# Patient Record
Sex: FEMALE | Race: WHITE | NOT HISPANIC OR LATINO | Employment: OTHER | URBAN - METROPOLITAN AREA
[De-identification: names, ages, dates, MRNs, and addresses within clinical notes are randomized per-mention and may not be internally consistent; named-entity substitution may affect disease eponyms.]

---

## 2017-07-30 ENCOUNTER — HOSPITAL ENCOUNTER (INPATIENT)
Facility: HOSPITAL | Age: 47
LOS: 7 days | Discharge: HOME | DRG: 751 | End: 2017-08-07
Attending: EMERGENCY MEDICINE | Admitting: PSYCHIATRY & NEUROLOGY
Payer: COMMERCIAL

## 2017-07-30 DIAGNOSIS — G40.909 EPILEPSY (HCC): ICD-10-CM

## 2017-07-30 DIAGNOSIS — R45.851 SUICIDAL THOUGHTS: ICD-10-CM

## 2017-07-30 DIAGNOSIS — J45.909 ASTHMA: ICD-10-CM

## 2017-07-30 DIAGNOSIS — F33.2 SEVERE EPISODE OF RECURRENT MAJOR DEPRESSIVE DISORDER, WITHOUT PSYCHOTIC FEATURES (HCC): Primary | Chronic | ICD-10-CM

## 2017-07-30 LAB
AMPHETAMINES SERPL QL SCN: NEGATIVE
BARBITURATES UR QL: NEGATIVE
BENZODIAZ UR QL: NEGATIVE
COCAINE UR QL: NEGATIVE
HCG UR QL: NEGATIVE
METHADONE UR QL: NEGATIVE
OPIATES UR QL SCN: NEGATIVE
PCP UR QL: NEGATIVE
THC UR QL: NEGATIVE

## 2017-07-30 PROCEDURE — 36415 COLL VENOUS BLD VENIPUNCTURE: CPT | Performed by: EMERGENCY MEDICINE

## 2017-07-30 PROCEDURE — 80307 DRUG TEST PRSMV CHEM ANLYZR: CPT | Performed by: EMERGENCY MEDICINE

## 2017-07-30 PROCEDURE — 81025 URINE PREGNANCY TEST: CPT | Performed by: EMERGENCY MEDICINE

## 2017-07-30 PROCEDURE — 80320 DRUG SCREEN QUANTALCOHOLS: CPT | Performed by: EMERGENCY MEDICINE

## 2017-07-31 LAB — ETHANOL SERPL-MCNC: <3 MG/DL (ref 0–3)

## 2017-07-31 PROCEDURE — 99285 EMERGENCY DEPT VISIT HI MDM: CPT

## 2017-07-31 RX ORDER — ALBUTEROL SULFATE 90 UG/1
2 AEROSOL, METERED RESPIRATORY (INHALATION) EVERY 6 HOURS PRN
COMMUNITY
End: 2017-08-07 | Stop reason: HOSPADM

## 2017-07-31 RX ORDER — HYDROXYZINE HYDROCHLORIDE 25 MG/1
25 TABLET, FILM COATED ORAL EVERY 6 HOURS PRN
Status: DISCONTINUED | OUTPATIENT
Start: 2017-07-31 | End: 2017-08-07 | Stop reason: HOSPADM

## 2017-07-31 RX ORDER — OLANZAPINE 10 MG/1
10 TABLET ORAL
Status: DISCONTINUED | OUTPATIENT
Start: 2017-07-31 | End: 2017-08-07 | Stop reason: HOSPADM

## 2017-07-31 RX ORDER — ACETAMINOPHEN 325 MG/1
650 TABLET ORAL EVERY 4 HOURS PRN
Status: DISCONTINUED | OUTPATIENT
Start: 2017-07-31 | End: 2017-08-07 | Stop reason: HOSPADM

## 2017-07-31 RX ORDER — ZOLPIDEM TARTRATE 5 MG/1
5 TABLET ORAL
Status: DISCONTINUED | OUTPATIENT
Start: 2017-07-31 | End: 2017-08-07 | Stop reason: HOSPADM

## 2017-07-31 RX ORDER — HALOPERIDOL 5 MG
5 TABLET ORAL EVERY 8 HOURS PRN
Status: DISCONTINUED | OUTPATIENT
Start: 2017-07-31 | End: 2017-08-07 | Stop reason: HOSPADM

## 2017-07-31 RX ORDER — BENZTROPINE MESYLATE 1 MG/ML
1 INJECTION INTRAMUSCULAR; INTRAVENOUS EVERY 6 HOURS PRN
Status: DISCONTINUED | OUTPATIENT
Start: 2017-07-31 | End: 2017-08-07 | Stop reason: HOSPADM

## 2017-07-31 RX ORDER — LORAZEPAM 2 MG/ML
1 INJECTION INTRAMUSCULAR EVERY 6 HOURS PRN
Status: DISCONTINUED | OUTPATIENT
Start: 2017-07-31 | End: 2017-08-07 | Stop reason: HOSPADM

## 2017-07-31 RX ORDER — MAGNESIUM HYDROXIDE/ALUMINUM HYDROXICE/SIMETHICONE 120; 1200; 1200 MG/30ML; MG/30ML; MG/30ML
30 SUSPENSION ORAL EVERY 4 HOURS PRN
Status: DISCONTINUED | OUTPATIENT
Start: 2017-07-31 | End: 2017-08-07 | Stop reason: HOSPADM

## 2017-07-31 RX ORDER — LORAZEPAM 1 MG/1
1 TABLET ORAL EVERY 8 HOURS PRN
Status: DISCONTINUED | OUTPATIENT
Start: 2017-07-31 | End: 2017-08-07 | Stop reason: HOSPADM

## 2017-07-31 RX ORDER — ACETAMINOPHEN 325 MG/1
650 TABLET ORAL EVERY 6 HOURS PRN
Status: DISCONTINUED | OUTPATIENT
Start: 2017-07-31 | End: 2017-08-07 | Stop reason: HOSPADM

## 2017-07-31 RX ORDER — BENZTROPINE MESYLATE 1 MG/1
1 TABLET ORAL EVERY 6 HOURS PRN
Status: DISCONTINUED | OUTPATIENT
Start: 2017-07-31 | End: 2017-08-07 | Stop reason: HOSPADM

## 2017-07-31 RX ORDER — TRAMADOL HYDROCHLORIDE 50 MG/1
50 TABLET ORAL EVERY 6 HOURS PRN
Status: DISCONTINUED | OUTPATIENT
Start: 2017-07-31 | End: 2017-08-07 | Stop reason: HOSPADM

## 2017-07-31 RX ORDER — OLANZAPINE 10 MG/1
10 INJECTION, POWDER, LYOPHILIZED, FOR SOLUTION INTRAMUSCULAR
Status: DISCONTINUED | OUTPATIENT
Start: 2017-07-31 | End: 2017-08-07 | Stop reason: HOSPADM

## 2017-07-31 RX ORDER — HALOPERIDOL 5 MG/ML
5 INJECTION INTRAMUSCULAR EVERY 6 HOURS PRN
Status: DISCONTINUED | OUTPATIENT
Start: 2017-07-31 | End: 2017-08-07 | Stop reason: HOSPADM

## 2017-07-31 RX ORDER — RISPERIDONE 1 MG/1
1 TABLET, ORALLY DISINTEGRATING ORAL EVERY 4 HOURS PRN
Status: DISCONTINUED | OUTPATIENT
Start: 2017-07-31 | End: 2017-08-07 | Stop reason: HOSPADM

## 2017-07-31 RX ADMIN — ACETAMINOPHEN 650 MG: 325 TABLET, FILM COATED ORAL at 14:43

## 2017-08-01 PROBLEM — F33.2 SEVERE EPISODE OF RECURRENT MAJOR DEPRESSIVE DISORDER, WITHOUT PSYCHOTIC FEATURES (HCC): Chronic | Status: ACTIVE | Noted: 2017-08-01

## 2017-08-01 LAB
ALBUMIN SERPL BCP-MCNC: 3.1 G/DL (ref 3.5–5)
ALP SERPL-CCNC: 87 U/L (ref 46–116)
ALT SERPL W P-5'-P-CCNC: 20 U/L (ref 12–78)
ANION GAP SERPL CALCULATED.3IONS-SCNC: 7 MMOL/L (ref 4–13)
AST SERPL W P-5'-P-CCNC: 19 U/L (ref 5–45)
BASOPHILS # BLD AUTO: 0.01 THOUSANDS/ΜL (ref 0–0.1)
BASOPHILS NFR BLD AUTO: 0 % (ref 0–1)
BILIRUB SERPL-MCNC: 0.44 MG/DL (ref 0.2–1)
BUN SERPL-MCNC: 12 MG/DL (ref 5–25)
CALCIUM SERPL-MCNC: 8.8 MG/DL (ref 8.3–10.1)
CHLORIDE SERPL-SCNC: 104 MMOL/L (ref 100–108)
CHOLEST SERPL-MCNC: 191 MG/DL (ref 50–200)
CO2 SERPL-SCNC: 27 MMOL/L (ref 21–32)
CREAT SERPL-MCNC: 0.84 MG/DL (ref 0.6–1.3)
EOSINOPHIL # BLD AUTO: 0.1 THOUSAND/ΜL (ref 0–0.61)
EOSINOPHIL NFR BLD AUTO: 2 % (ref 0–6)
ERYTHROCYTE [DISTWIDTH] IN BLOOD BY AUTOMATED COUNT: 12.7 % (ref 11.6–15.1)
GFR SERPL CREATININE-BSD FRML MDRD: 84 ML/MIN/1.73SQ M
GLUCOSE P FAST SERPL-MCNC: 101 MG/DL (ref 65–99)
GLUCOSE SERPL-MCNC: 101 MG/DL (ref 65–140)
HCT VFR BLD AUTO: 39.3 % (ref 34.8–46.1)
HDLC SERPL-MCNC: 57 MG/DL (ref 40–60)
HGB BLD-MCNC: 13.2 G/DL (ref 11.5–15.4)
LDLC SERPL CALC-MCNC: 116 MG/DL (ref 0–100)
LYMPHOCYTES # BLD AUTO: 1.11 THOUSANDS/ΜL (ref 0.6–4.47)
LYMPHOCYTES NFR BLD AUTO: 18 % (ref 14–44)
MCH RBC QN AUTO: 30.9 PG (ref 26.8–34.3)
MCHC RBC AUTO-ENTMCNC: 33.6 G/DL (ref 31.4–37.4)
MCV RBC AUTO: 92 FL (ref 82–98)
MONOCYTES # BLD AUTO: 0.42 THOUSAND/ΜL (ref 0.17–1.22)
MONOCYTES NFR BLD AUTO: 7 % (ref 4–12)
NEUTROPHILS # BLD AUTO: 4.61 THOUSANDS/ΜL (ref 1.85–7.62)
NEUTS SEG NFR BLD AUTO: 73 % (ref 43–75)
NRBC BLD AUTO-RTO: 0 /100 WBCS
PLATELET # BLD AUTO: 159 THOUSANDS/UL (ref 149–390)
PMV BLD AUTO: 11.3 FL (ref 8.9–12.7)
POTASSIUM SERPL-SCNC: 4.3 MMOL/L (ref 3.5–5.3)
PROT SERPL-MCNC: 7.2 G/DL (ref 6.4–8.2)
RBC # BLD AUTO: 4.27 MILLION/UL (ref 3.81–5.12)
SODIUM SERPL-SCNC: 138 MMOL/L (ref 136–145)
TRIGL SERPL-MCNC: 90 MG/DL
TSH SERPL DL<=0.05 MIU/L-ACNC: 2.39 UIU/ML (ref 0.36–3.74)
VALPROATE SERPL-MCNC: 14 UG/ML (ref 50–100)
WBC # BLD AUTO: 6.26 THOUSAND/UL (ref 4.31–10.16)

## 2017-08-01 PROCEDURE — 85025 COMPLETE CBC W/AUTO DIFF WBC: CPT | Performed by: PSYCHIATRY & NEUROLOGY

## 2017-08-01 PROCEDURE — 80053 COMPREHEN METABOLIC PANEL: CPT | Performed by: PSYCHIATRY & NEUROLOGY

## 2017-08-01 PROCEDURE — 80164 ASSAY DIPROPYLACETIC ACD TOT: CPT | Performed by: PSYCHIATRY & NEUROLOGY

## 2017-08-01 PROCEDURE — 80061 LIPID PANEL: CPT | Performed by: PSYCHIATRY & NEUROLOGY

## 2017-08-01 PROCEDURE — 84443 ASSAY THYROID STIM HORMONE: CPT | Performed by: PSYCHIATRY & NEUROLOGY

## 2017-08-01 PROCEDURE — 86592 SYPHILIS TEST NON-TREP QUAL: CPT | Performed by: PSYCHIATRY & NEUROLOGY

## 2017-08-01 RX ORDER — DIVALPROEX SODIUM 500 MG/1
500 TABLET, DELAYED RELEASE ORAL ONCE
Status: COMPLETED | OUTPATIENT
Start: 2017-08-01 | End: 2017-08-01

## 2017-08-01 RX ORDER — ALBUTEROL SULFATE 90 UG/1
2 AEROSOL, METERED RESPIRATORY (INHALATION) EVERY 6 HOURS PRN
Status: DISCONTINUED | OUTPATIENT
Start: 2017-08-01 | End: 2017-08-07 | Stop reason: HOSPADM

## 2017-08-01 RX ORDER — CITALOPRAM 20 MG/1
20 TABLET ORAL DAILY
Status: DISCONTINUED | OUTPATIENT
Start: 2017-08-01 | End: 2017-08-07 | Stop reason: HOSPADM

## 2017-08-01 RX ORDER — DIVALPROEX SODIUM 500 MG/1
500 TABLET, DELAYED RELEASE ORAL 2 TIMES DAILY
Status: DISCONTINUED | OUTPATIENT
Start: 2017-08-01 | End: 2017-08-07 | Stop reason: HOSPADM

## 2017-08-01 RX ADMIN — DIVALPROEX SODIUM 500 MG: 500 TABLET, DELAYED RELEASE ORAL at 12:34

## 2017-08-01 RX ADMIN — CITALOPRAM HYDROBROMIDE 20 MG: 20 TABLET ORAL at 10:11

## 2017-08-01 RX ADMIN — DIVALPROEX SODIUM 500 MG: 500 TABLET, DELAYED RELEASE ORAL at 18:04

## 2017-08-01 RX ADMIN — DIVALPROEX SODIUM 500 MG: 500 TABLET, DELAYED RELEASE ORAL at 10:11

## 2017-08-01 RX ADMIN — ACETAMINOPHEN 650 MG: 325 TABLET, FILM COATED ORAL at 06:21

## 2017-08-02 LAB — RPR SER QL: NORMAL

## 2017-08-02 RX ADMIN — ACETAMINOPHEN 650 MG: 325 TABLET, FILM COATED ORAL at 11:54

## 2017-08-02 RX ADMIN — DIVALPROEX SODIUM 500 MG: 500 TABLET, DELAYED RELEASE ORAL at 18:02

## 2017-08-02 RX ADMIN — CITALOPRAM HYDROBROMIDE 20 MG: 20 TABLET ORAL at 08:00

## 2017-08-02 RX ADMIN — DIVALPROEX SODIUM 500 MG: 500 TABLET, DELAYED RELEASE ORAL at 08:00

## 2017-08-03 RX ADMIN — DIVALPROEX SODIUM 500 MG: 500 TABLET, DELAYED RELEASE ORAL at 17:56

## 2017-08-03 RX ADMIN — DIVALPROEX SODIUM 500 MG: 500 TABLET, DELAYED RELEASE ORAL at 08:12

## 2017-08-03 RX ADMIN — CITALOPRAM HYDROBROMIDE 20 MG: 20 TABLET ORAL at 08:12

## 2017-08-04 RX ADMIN — DIVALPROEX SODIUM 500 MG: 500 TABLET, DELAYED RELEASE ORAL at 17:14

## 2017-08-04 RX ADMIN — DIVALPROEX SODIUM 500 MG: 500 TABLET, DELAYED RELEASE ORAL at 08:18

## 2017-08-04 RX ADMIN — CITALOPRAM HYDROBROMIDE 20 MG: 20 TABLET ORAL at 08:18

## 2017-08-05 RX ADMIN — CITALOPRAM HYDROBROMIDE 20 MG: 20 TABLET ORAL at 08:45

## 2017-08-05 RX ADMIN — DIVALPROEX SODIUM 500 MG: 500 TABLET, DELAYED RELEASE ORAL at 19:15

## 2017-08-05 RX ADMIN — DIVALPROEX SODIUM 500 MG: 500 TABLET, DELAYED RELEASE ORAL at 08:45

## 2017-08-06 LAB
BILIRUB UR QL STRIP: NEGATIVE
CLARITY UR: CLEAR
COLOR UR: YELLOW
GLUCOSE UR STRIP-MCNC: NEGATIVE MG/DL
HGB UR QL STRIP.AUTO: NEGATIVE
KETONES UR STRIP-MCNC: NEGATIVE MG/DL
LEUKOCYTE ESTERASE UR QL STRIP: NEGATIVE
NITRITE UR QL STRIP: NEGATIVE
PH UR STRIP.AUTO: 7 [PH] (ref 4.5–8)
PROT UR STRIP-MCNC: NEGATIVE MG/DL
SP GR UR STRIP.AUTO: 1.01 (ref 1–1.03)
UROBILINOGEN UR QL STRIP.AUTO: 0.2 E.U./DL

## 2017-08-06 PROCEDURE — 81003 URINALYSIS AUTO W/O SCOPE: CPT | Performed by: PSYCHIATRY & NEUROLOGY

## 2017-08-06 RX ADMIN — CITALOPRAM HYDROBROMIDE 20 MG: 20 TABLET ORAL at 08:22

## 2017-08-06 RX ADMIN — DIVALPROEX SODIUM 500 MG: 500 TABLET, DELAYED RELEASE ORAL at 17:18

## 2017-08-06 RX ADMIN — DIVALPROEX SODIUM 500 MG: 500 TABLET, DELAYED RELEASE ORAL at 08:22

## 2017-08-07 VITALS
DIASTOLIC BLOOD PRESSURE: 81 MMHG | TEMPERATURE: 98.2 F | HEART RATE: 71 BPM | WEIGHT: 293 LBS | SYSTOLIC BLOOD PRESSURE: 137 MMHG | HEIGHT: 65 IN | RESPIRATION RATE: 16 BRPM | BODY MASS INDEX: 48.82 KG/M2 | OXYGEN SATURATION: 97 %

## 2017-08-07 LAB
BASOPHILS # BLD AUTO: 0.01 THOUSANDS/ΜL (ref 0–0.1)
BASOPHILS NFR BLD AUTO: 0 % (ref 0–1)
EOSINOPHIL # BLD AUTO: 0.06 THOUSAND/ΜL (ref 0–0.61)
EOSINOPHIL NFR BLD AUTO: 1 % (ref 0–6)
ERYTHROCYTE [DISTWIDTH] IN BLOOD BY AUTOMATED COUNT: 12.5 % (ref 11.6–15.1)
HCT VFR BLD AUTO: 38.4 % (ref 34.8–46.1)
HGB BLD-MCNC: 12.9 G/DL (ref 11.5–15.4)
LYMPHOCYTES # BLD AUTO: 1.19 THOUSANDS/ΜL (ref 0.6–4.47)
LYMPHOCYTES NFR BLD AUTO: 19 % (ref 14–44)
MCH RBC QN AUTO: 30.7 PG (ref 26.8–34.3)
MCHC RBC AUTO-ENTMCNC: 33.6 G/DL (ref 31.4–37.4)
MCV RBC AUTO: 91 FL (ref 82–98)
MONOCYTES # BLD AUTO: 0.53 THOUSAND/ΜL (ref 0.17–1.22)
MONOCYTES NFR BLD AUTO: 8 % (ref 4–12)
NEUTROPHILS # BLD AUTO: 4.48 THOUSANDS/ΜL (ref 1.85–7.62)
NEUTS SEG NFR BLD AUTO: 72 % (ref 43–75)
NRBC BLD AUTO-RTO: 0 /100 WBCS
PLATELET # BLD AUTO: 163 THOUSANDS/UL (ref 149–390)
PMV BLD AUTO: 11 FL (ref 8.9–12.7)
RBC # BLD AUTO: 4.2 MILLION/UL (ref 3.81–5.12)
WBC # BLD AUTO: 6.31 THOUSAND/UL (ref 4.31–10.16)

## 2017-08-07 PROCEDURE — 85025 COMPLETE CBC W/AUTO DIFF WBC: CPT | Performed by: PSYCHIATRY & NEUROLOGY

## 2017-08-07 RX ORDER — CITALOPRAM 20 MG/1
20 TABLET ORAL DAILY
Qty: 30 TABLET | Refills: 0 | Status: SHIPPED | OUTPATIENT
Start: 2017-08-07 | End: 2021-06-19

## 2017-08-07 RX ORDER — DIVALPROEX SODIUM 500 MG/1
500 TABLET, DELAYED RELEASE ORAL 2 TIMES DAILY
Qty: 60 TABLET | Refills: 0 | Status: SHIPPED | OUTPATIENT
Start: 2017-08-07 | End: 2021-06-19

## 2017-08-07 RX ORDER — ALBUTEROL SULFATE 90 UG/1
2 AEROSOL, METERED RESPIRATORY (INHALATION) EVERY 6 HOURS PRN
Qty: 1 INHALER | Refills: 0 | Status: SHIPPED | OUTPATIENT
Start: 2017-08-07 | End: 2017-09-06

## 2017-08-07 RX ADMIN — CITALOPRAM HYDROBROMIDE 20 MG: 20 TABLET ORAL at 08:27

## 2017-08-07 RX ADMIN — DIVALPROEX SODIUM 500 MG: 500 TABLET, DELAYED RELEASE ORAL at 08:27

## 2017-08-07 RX ADMIN — ALUMINUM HYDROXIDE, MAGNESIUM HYDROXIDE, AND SIMETHICONE 30 ML: 200; 200; 20 SUSPENSION ORAL at 10:37

## 2021-04-10 ENCOUNTER — HOSPITAL ENCOUNTER (EMERGENCY)
Facility: HOSPITAL | Age: 51
Discharge: HOME | End: 2021-04-10
Attending: EMERGENCY MEDICINE | Admitting: EMERGENCY MEDICINE
Payer: COMMERCIAL

## 2021-04-10 ENCOUNTER — APPOINTMENT (EMERGENCY)
Dept: RADIOLOGY | Facility: HOSPITAL | Age: 51
End: 2021-04-10
Payer: COMMERCIAL

## 2021-04-10 VITALS
RESPIRATION RATE: 17 BRPM | OXYGEN SATURATION: 96 % | DIASTOLIC BLOOD PRESSURE: 94 MMHG | SYSTOLIC BLOOD PRESSURE: 141 MMHG | WEIGHT: 293 LBS | BODY MASS INDEX: 59.91 KG/M2 | HEART RATE: 92 BPM | TEMPERATURE: 98.8 F

## 2021-04-10 DIAGNOSIS — L03.116 BILATERAL CELLULITIS OF LOWER LEG: Primary | ICD-10-CM

## 2021-04-10 DIAGNOSIS — R79.89 ELEVATED D-DIMER: ICD-10-CM

## 2021-04-10 DIAGNOSIS — R60.9 PERIPHERAL EDEMA: ICD-10-CM

## 2021-04-10 DIAGNOSIS — L03.115 BILATERAL CELLULITIS OF LOWER LEG: Primary | ICD-10-CM

## 2021-04-10 LAB
ALBUMIN SERPL BCP-MCNC: 3.5 G/DL (ref 3.4–4.8)
ALP SERPL-CCNC: 84.7 U/L (ref 35–140)
ALT SERPL W P-5'-P-CCNC: 17 U/L (ref 5–54)
ANION GAP SERPL CALCULATED.3IONS-SCNC: 5 MMOL/L (ref 4–13)
APTT PPP: 24 SECONDS (ref 23–31)
AST SERPL W P-5'-P-CCNC: 16 U/L (ref 15–41)
BASOPHILS # BLD AUTO: 0.02 THOUSANDS/ΜL (ref 0–0.1)
BASOPHILS NFR BLD AUTO: 0 % (ref 0–1)
BILIRUB SERPL-MCNC: 0.28 MG/DL (ref 0.3–1.2)
BUN SERPL-MCNC: 16 MG/DL (ref 6–20)
CALCIUM SERPL-MCNC: 8.9 MG/DL (ref 8.4–10.2)
CHLORIDE SERPL-SCNC: 104 MMOL/L (ref 96–108)
CO2 SERPL-SCNC: 29 MMOL/L (ref 22–33)
CREAT SERPL-MCNC: 0.87 MG/DL (ref 0.4–1.1)
CRP SERPL QL: 0.6 MG/L (ref 0–1)
D DIMER PPP FEU-MCNC: 2.1 MG/L FEU (ref 0.19–0.49)
EOSINOPHIL # BLD AUTO: 0.24 THOUSAND/ΜL (ref 0–0.61)
EOSINOPHIL NFR BLD AUTO: 3 % (ref 0–6)
ERYTHROCYTE [DISTWIDTH] IN BLOOD BY AUTOMATED COUNT: 12.5 % (ref 11.6–15.1)
GFR SERPL CREATININE-BSD FRML MDRD: 78 ML/MIN/1.73SQ M
GLUCOSE SERPL-MCNC: 103 MG/DL (ref 65–140)
HCT VFR BLD AUTO: 37.8 % (ref 34.8–46.1)
HGB BLD-MCNC: 12.2 G/DL (ref 11.5–15.4)
IMM GRANULOCYTES # BLD AUTO: 0.03 THOUSAND/UL (ref 0–0.2)
IMM GRANULOCYTES NFR BLD AUTO: 0 % (ref 0–2)
INR PPP: <0.9 (ref 0.9–1.1)
LYMPHOCYTES # BLD AUTO: 1.16 THOUSANDS/ΜL (ref 0.6–4.47)
LYMPHOCYTES NFR BLD AUTO: 15 % (ref 14–44)
MCH RBC QN AUTO: 30.6 PG (ref 26.8–34.3)
MCHC RBC AUTO-ENTMCNC: 32.3 G/DL (ref 31.4–37.4)
MCV RBC AUTO: 95 FL (ref 82–98)
MONOCYTES # BLD AUTO: 0.57 THOUSAND/ΜL (ref 0.17–1.22)
MONOCYTES NFR BLD AUTO: 7 % (ref 4–12)
NEUTROPHILS # BLD AUTO: 5.67 THOUSANDS/ΜL (ref 1.85–7.62)
NEUTS SEG NFR BLD AUTO: 75 % (ref 43–75)
PLATELET # BLD AUTO: 186 THOUSANDS/UL (ref 149–390)
PMV BLD AUTO: 11.5 FL (ref 8.9–12.7)
POTASSIUM SERPL-SCNC: 3.8 MMOL/L (ref 3.5–5)
PROT SERPL-MCNC: 7 G/DL (ref 6.4–8.3)
PROTHROMBIN TIME: 10.1 SECONDS (ref 9.5–12.1)
RBC # BLD AUTO: 3.99 MILLION/UL (ref 3.81–5.12)
SODIUM SERPL-SCNC: 138 MMOL/L (ref 133–145)
WBC # BLD AUTO: 7.69 THOUSAND/UL (ref 4.31–10.16)

## 2021-04-10 PROCEDURE — 96372 THER/PROPH/DIAG INJ SC/IM: CPT

## 2021-04-10 PROCEDURE — 36415 COLL VENOUS BLD VENIPUNCTURE: CPT | Performed by: EMERGENCY MEDICINE

## 2021-04-10 PROCEDURE — 96365 THER/PROPH/DIAG IV INF INIT: CPT

## 2021-04-10 PROCEDURE — 85730 THROMBOPLASTIN TIME PARTIAL: CPT | Performed by: EMERGENCY MEDICINE

## 2021-04-10 PROCEDURE — 73590 X-RAY EXAM OF LOWER LEG: CPT

## 2021-04-10 PROCEDURE — 80053 COMPREHEN METABOLIC PANEL: CPT | Performed by: EMERGENCY MEDICINE

## 2021-04-10 PROCEDURE — 85025 COMPLETE CBC W/AUTO DIFF WBC: CPT | Performed by: EMERGENCY MEDICINE

## 2021-04-10 PROCEDURE — 86140 C-REACTIVE PROTEIN: CPT | Performed by: EMERGENCY MEDICINE

## 2021-04-10 PROCEDURE — 99284 EMERGENCY DEPT VISIT MOD MDM: CPT | Performed by: EMERGENCY MEDICINE

## 2021-04-10 PROCEDURE — 99284 EMERGENCY DEPT VISIT MOD MDM: CPT

## 2021-04-10 PROCEDURE — 85610 PROTHROMBIN TIME: CPT | Performed by: EMERGENCY MEDICINE

## 2021-04-10 PROCEDURE — 96375 TX/PRO/DX INJ NEW DRUG ADDON: CPT

## 2021-04-10 PROCEDURE — 85379 FIBRIN DEGRADATION QUANT: CPT | Performed by: EMERGENCY MEDICINE

## 2021-04-10 RX ORDER — FUROSEMIDE 20 MG/1
20 TABLET ORAL DAILY PRN
Qty: 10 TABLET | Refills: 0 | Status: SHIPPED | OUTPATIENT
Start: 2021-04-10 | End: 2021-04-20

## 2021-04-10 RX ORDER — CEFAZOLIN SODIUM 1 G/50ML
1000 SOLUTION INTRAVENOUS ONCE
Status: COMPLETED | OUTPATIENT
Start: 2021-04-10 | End: 2021-04-10

## 2021-04-10 RX ORDER — FUROSEMIDE 10 MG/ML
20 INJECTION INTRAMUSCULAR; INTRAVENOUS ONCE
Status: COMPLETED | OUTPATIENT
Start: 2021-04-10 | End: 2021-04-10

## 2021-04-10 RX ORDER — CEPHALEXIN 500 MG/1
500 CAPSULE ORAL EVERY 6 HOURS SCHEDULED
Qty: 28 CAPSULE | Refills: 0 | Status: SHIPPED | OUTPATIENT
Start: 2021-04-10 | End: 2021-04-17

## 2021-04-10 RX ADMIN — CEFAZOLIN SODIUM 1000 MG: 1 SOLUTION INTRAVENOUS at 01:38

## 2021-04-10 RX ADMIN — ENOXAPARIN SODIUM 100 MG: 100 INJECTION SUBCUTANEOUS at 02:32

## 2021-04-10 RX ADMIN — FUROSEMIDE 20 MG: 10 INJECTION, SOLUTION INTRAVENOUS at 02:32

## 2021-04-10 NOTE — ED PROVIDER NOTES
History  Chief Complaint   Patient presents with    Leg Swelling     Pt presents to the ED with c/o B/L leg swelling and L leg pain that started a few days ago  This is a 81-year-old morbidly abuse female presenting to the ED for evaluation of bilateral lower leg pain and swelling  She states this has been ongoing for several months, she feels like use just worsening tonight so she want to come and get checked  She has not been on antibiotics lately  She denies any fevers, chills, drainage from her legs  She does state that she takes water pills due to edema but does not know if she has congestive heart failure or not  History provided by:  Patient   used: No        Prior to Admission Medications   Prescriptions Last Dose Informant Patient Reported? Taking?   citalopram (CeleXA) 20 mg tablet   No No   Sig: Take 1 tablet by mouth daily for 30 days   divalproex sodium (DEPAKOTE) 500 mg EC tablet   No No   Sig: Take 1 tablet by mouth 2 (two) times a day for 30 days      Facility-Administered Medications: None       Past Medical History:   Diagnosis Date    Depression     Migraine     Psychiatric disorder     Seizures ([address] )     Suicide attempt [facility])        Past Surgical History:   Procedure Laterality Date     SECTION      CHOLECYSTECTOMY         Family History   Family history unknown: Yes     I have reviewed and agree with the history as documented  E-Cigarette/Vaping     E-Cigarette/Vaping Substances     Social History     Tobacco Use    Smoking status: Never Smoker    Smokeless tobacco: Never Used   Substance Use Topics    Alcohol use: No    Drug use: No       Review of Systems   Cardiovascular: Positive for leg swelling  All other systems reviewed and are negative  Physical Exam  Physical Exam  Vitals signs and nursing note reviewed  Constitutional:       General: She is not in acute distress  Appearance: Normal appearance  She is well-developed  She is obese  HENT:      Head: Normocephalic and atraumatic  Right Ear: External ear normal       Left Ear: External ear normal       Nose: Nose normal       Mouth/Throat:      Pharynx: Oropharynx is clear  Eyes:      Conjunctiva/sclera: Conjunctivae normal    Neck:      Musculoskeletal: Neck supple  Cardiovascular:      Rate and Rhythm: Normal rate and regular rhythm  Pulses: Normal pulses  Heart sounds: Normal heart sounds  No murmur  Pulmonary:      Effort: Pulmonary effort is normal  No respiratory distress  Breath sounds: Normal breath sounds  Abdominal:      General: Abdomen is flat  Palpations: Abdomen is soft  Tenderness: There is no abdominal tenderness  Skin:     General: Skin is warm and dry  Capillary Refill: Capillary refill takes less than 2 seconds  Comments: Bilateral lower leg edema w/ pitting  Cracking, scabs, scattered areas of denuded skin to bilateral ankles  Mild warmth and erythema  No drainage  No calf tenderness, neg alessandra's sign  Neurological:      General: No focal deficit present  Mental Status: She is alert and oriented to person, place, and time  Mental status is at baseline  Psychiatric:      Comments: Flat affect           Vital Signs  ED Triage Vitals [04/10/21 0044]   Temperature Pulse Respirations Blood Pressure SpO2   98 8 °F (37 1 °C) 89 18 136/68 97 %      Temp Source Heart Rate Source Patient Position - Orthostatic VS BP Location FiO2 (%)   Oral Monitor Lying Right arm --      Pain Score       8           Vitals:    04/10/21 0044 04/10/21 0235   BP: 136/68 141/94   Pulse: 89 92   Patient Position - Orthostatic VS: Lying Sitting         Visual Acuity      ED Medications  Medications   ceFAZolin (ANCEF) IVPB (premix in dextrose) 1,000 mg 50 mL (0 mg Intravenous Stopped 4/10/21 0213)   furosemide (LASIX) injection 20 mg (20 mg Intravenous Given 4/10/21 0232)   enoxaparin (LOVENOX) subcutaneous injection 100 mg (100 mg Subcutaneous Given 4/10/21 0232)       Diagnostic Studies  Results Reviewed     Procedure Component Value Units Date/Time    D-dimer, quantitative [[id number]]  (Abnormal) Collected: 04/10/21 0129    Lab Status: Final result Specimen: Blood from Arm, Left Updated: 04/10/21 0154     D-Dimer, Quant  2 10 mg/L FEU     Protime-INR [[id number]]  (Abnormal) Collected: 04/10/21 0129    Lab Status: Final result Specimen: Blood from Arm, Left Updated: 04/10/21 0154     Protime 10 1 seconds      INR <0 90    Narrative:      INR Reference Ranges:  No Anticoagulant, Normal:           0 9-1 1  Standard Dose, Oral Anticoagulant:  2 0-3 0  High Dose, Oral Anticoagulant:      2 5-3 5    APTT [[id number]]  (Normal) Collected: 04/10/21 0129    Lab Status: Final result Specimen: Blood from Arm, Left Updated: 04/10/21 0154     PTT 24 seconds     Comprehensive metabolic panel [[id number]]  (Abnormal) Collected: 04/10/21 0129    Lab Status: Final result Specimen: Blood from Arm, Left Updated: 04/10/21 0152     Sodium 138 mmol/L      Potassium 3 8 mmol/L      Chloride 104 mmol/L      CO2 29 mmol/L      ANION GAP 5 mmol/L      BUN 16 mg/dL      Creatinine 0 87 mg/dL      Glucose 103 mg/dL      Calcium 8 9 mg/dL      AST 16 U/L      ALT 17 U/L      Alkaline Phosphatase 84 7 U/L      Total Protein 7 0 g/dL      Albumin 3 5 g/dL      Total Bilirubin 0 28 mg/dL      eGFR 78 ml/min/1 73sq m     Narrative:      Kyle guidelines for Chronic Kidney Disease (CKD):     Stage 1 with normal or high GFR (GFR > 90 mL/min/1 73 square meters)    Stage 2 Mild CKD (GFR = 60-89 mL/min/1 73 square meters)    Stage 3A Moderate CKD (GFR = 45-59 mL/min/1 73 square meters)    Stage 3B Moderate CKD (GFR = 30-44 mL/min/1 73 square meters)    Stage 4 Severe CKD (GFR = 15-29 mL/min/1 73 square meters)    Stage 5 End Stage CKD (GFR <15 mL/min/1 73 square meters)  Note: GFR calculation is accurate only with a steady state creatinine C-reactive protein [[id number]]  (Normal) Collected: 04/10/21 0129    Lab Status: Final result Specimen: Blood from Arm, Left Updated: 04/10/21 0152     CRP 0 6 mg/L     CBC and differential [[id number]]  (Normal) Collected: 04/10/21 0129    Lab Status: Final result Specimen: Blood from Arm, Left Updated: 04/10/21 0134     WBC 7 69 Thousand/uL      RBC 3 99 Million/uL      Hemoglobin 12 2 g/dL      Hematocrit 37 8 %      MCV 95 fL      MCH 30 6 pg      MCHC 32 3 g/dL      RDW 12 5 %      MPV 11 5 fL      Platelets 485 Thousands/uL      Neutrophils Relative 75 %      Immat GRANS % 0 %      Lymphocytes Relative 15 %      Monocytes Relative 7 %      Eosinophils Relative 3 %      Basophils Relative 0 %      Neutrophils Absolute 5 67 Thousands/µL      Immature Grans Absolute 0 03 Thousand/uL      Lymphocytes Absolute 1 16 Thousands/µL      Monocytes Absolute 0 57 Thousand/µL      Eosinophils Absolute 0 24 Thousand/µL      Basophils Absolute 0 02 Thousands/µL                  XR tibia fibula 2 views LEFT    (Results Pending)   XR tibia fibula 2 views RIGHT    (Results Pending)              Procedures  Procedures         ED Course  ED Course as of Apr 10 0556   Sat Apr 10, 2021   0211 D-dimer elevated at 2 10  will give dose of lovenox here to cover for possible DVT, then d/c home to return during the day for a venous duplex of her legs  D/c home on keflex  SBIRT 20yo+      Most Recent Value   SBIRT (24 yo +)   In order to provide better care to our patients, we are screening all of our patients for alcohol and drug use  Would it be okay to ask you these screening questions?   No Filed at: 04/10/2021 0045                    MDM  Number of Diagnoses or Management Options  Bilateral cellulitis of lower leg: new and requires workup  Elevated d-dimer: new and requires workup  Peripheral edema: new and requires workup  Diagnosis management comments: 54 yo F w/ morbid obesity, bilateral lower leg cellulitis, venous insufficiency changes, pitting edema  Labs ok except elevated d-dimer  Start on abx, advised to return tomorrow for venous duplex ultrasound to r/o DVT  Given IV cefazolin in ED, d/c on keflex PO  Given 1 time dose of lovenox  Amount and/or Complexity of Data Reviewed  Clinical lab tests: ordered and reviewed  Tests in the radiology section of CPT®: ordered and reviewed    Risk of Complications, Morbidity, and/or Mortality  Presenting problems: moderate  Diagnostic procedures: moderate  Management options: moderate    Patient Progress  Patient progress: stable      Disposition  Final diagnoses:   Bilateral cellulitis of lower leg   Peripheral edema   Elevated d-dimer     Time reflects when diagnosis was documented in both MDM as applicable and the Disposition within this note     Time User Action Codes Description Comment    4/10/2021  2:14 AM Andreea Alatorre [L03 116,  Z14 [id number]] Bilateral cellulitis of lower leg     4/10/2021  2:14 AM Andreea Alatorre [R60 9] Peripheral edema     4/10/2021  2:14 AM Andreea Alatorre [R79 89] Elevated d-dimer       ED Disposition     ED Disposition Condition Date/Time Comment    Discharge Stable Sat Apr 10, 2021  2:14 AM Georgie Damon discharge to home/self care              Follow-up Information     Follow up With Specialties Details Why Moi Abarca MD Emergency Medicine In 3 days  [address] [place] [address]  [phone]            Discharge Medication List as of 4/10/2021  2:16 AM      START taking these medications    Details   cephalexin (KEFLEX) 500 mg capsule Take 1 capsule (500 mg total) by mouth every 6 (six) hours for 7 days, Starting Sat 4/10/2021, Until Sat 4/17/2021, Normal      furosemide (LASIX) 20 mg tablet Take 1 tablet (20 mg total) by mouth daily as needed (peripheral edema) for up to 10 days, Starting Sat 4/10/2021, Until Tue 4/20/2021, Normal         CONTINUE these medications which have NOT CHANGED Details   citalopram (CeleXA) 20 mg tablet Take 1 tablet by mouth daily for 30 days, Starting Mon 8/7/2017, Until Wed 9/6/2017, Print      divalproex sodium (DEPAKOTE) 500 mg EC tablet Take 1 tablet by mouth 2 (two) times a day for 30 days, Starting Mon 8/7/2017, Until Wed 9/6/2017, Print           No discharge procedures on file      PDMP Review     None          ED Provider  Electronically Signed by           Marita Vick DO  04/10/21 1723

## 2021-04-10 NOTE — DISCHARGE INSTRUCTIONS
Please return during the day time for an ultrasound of your legs to rule out DVT (blood clot in your legs)

## 2021-04-17 ENCOUNTER — HOSPITAL ENCOUNTER (EMERGENCY)
Facility: HOSPITAL | Age: 51
Discharge: HOME | End: 2021-04-17
Payer: COMMERCIAL

## 2021-04-17 ENCOUNTER — APPOINTMENT (EMERGENCY)
Dept: VASCULAR ULTRASOUND | Facility: HOSPITAL | Age: 51
End: 2021-04-17
Payer: COMMERCIAL

## 2021-04-17 VITALS
HEIGHT: 65 IN | DIASTOLIC BLOOD PRESSURE: 87 MMHG | TEMPERATURE: 97.6 F | RESPIRATION RATE: 16 BRPM | OXYGEN SATURATION: 97 % | SYSTOLIC BLOOD PRESSURE: 140 MMHG | HEART RATE: 78 BPM | WEIGHT: 293 LBS | BODY MASS INDEX: 48.82 KG/M2

## 2021-04-17 DIAGNOSIS — L03.119 CELLULITIS OF LOWER EXTREMITY, UNSPECIFIED LATERALITY: Primary | ICD-10-CM

## 2021-04-17 LAB
FLUAV RNA RESP QL NAA+PROBE: NEGATIVE
FLUBV RNA RESP QL NAA+PROBE: NEGATIVE
RSV RNA RESP QL NAA+PROBE: NEGATIVE
SARS-COV-2 RNA RESP QL NAA+PROBE: NEGATIVE

## 2021-04-17 PROCEDURE — 99284 EMERGENCY DEPT VISIT MOD MDM: CPT

## 2021-04-17 PROCEDURE — 93970 EXTREMITY STUDY: CPT | Performed by: SURGERY

## 2021-04-17 PROCEDURE — 0241U HB NFCT DS VIR RESP RNA 4 TRGT: CPT

## 2021-04-17 PROCEDURE — 93970 EXTREMITY STUDY: CPT

## 2021-04-17 NOTE — ED PROVIDER NOTES
History  Chief Complaint   Patient presents with    Biological Exposure     possible esposure to covid     Evaluation of Abnormal Diagnostic Test     f/o for DVT      52-year-old female history multiple medical problems including depression behavior health issues prior DVT seizure disorder migraines presents secondary to 2 issues  Patient was seen here 1 week ago with bilateral lower leg edema with an elevated D-dimer received Lovenox was told to come back the next day for a DVT study since when she came in was after hours  Patient did not return for that DVT study  Patient states she still been having some edema in her legs and cramps and she was here for follow-up for that  Also no she says while she is here her son was around somebody who was COVID positive potentially last week  Her son has absolutely no symptoms she has absolutely no symptoms which would like a screening test   Patient was also diagnosed with cellulitis bilateral lower extremities and placed on Keflex for this which he has been taking for the past week  Prior to Admission Medications   Prescriptions Last Dose Informant Patient Reported? Taking?    cephalexin (KEFLEX) 500 mg capsule   No No   Sig: Take 1 capsule (500 mg total) by mouth every 6 (six) hours for 7 days   citalopram (CeleXA) 20 mg tablet   No No   Sig: Take 1 tablet by mouth daily for 30 days   divalproex sodium (DEPAKOTE) 500 mg EC tablet   No No   Sig: Take 1 tablet by mouth 2 (two) times a day for 30 days   furosemide (LASIX) 20 mg tablet   No No   Sig: Take 1 tablet (20 mg total) by mouth daily as needed (peripheral edema) for up to 10 days      Facility-Administered Medications: None       Past Medical History:   Diagnosis Date    Depression     [facility]     Migraine     Psychiatric disorder     Seizures ([facility] [address] )     Suicide attempt [facility])        Past Surgical History:   Procedure Laterality Date     SECTION      CHOLECYSTECTOMY         Family History   Family history unknown: Yes     I have reviewed and agree with the history as documented  E-Cigarette/Vaping     E-Cigarette/Vaping Substances     Social History     Tobacco Use    Smoking status: Never Smoker    Smokeless tobacco: Never Used   Substance Use Topics    Alcohol use: No    Drug use: No       Review of Systems   Constitutional: Negative for chills and fever  HENT: Negative for congestion  Eyes: Negative for visual disturbance  Respiratory: Negative for shortness of breath  Cardiovascular: Negative for chest pain  Gastrointestinal: Negative for abdominal pain  Endocrine: Negative for cold intolerance  Genitourinary: Negative for frequency  Musculoskeletal: Negative for gait problem  Skin: Positive for color change  Negative for rash  Neurological: Negative for dizziness  Psychiatric/Behavioral: Negative for behavioral problems and confusion  Physical Exam  Physical Exam  Vitals signs and nursing note reviewed  Constitutional:       Appearance: She is well-developed  HENT:      Head: Normocephalic and atraumatic  Eyes:      Conjunctiva/sclera: Conjunctivae normal       Pupils: Pupils are equal, round, and reactive to light  Neck:      Musculoskeletal: Normal range of motion and neck supple  Cardiovascular:      Rate and Rhythm: Normal rate and regular rhythm  Heart sounds: Normal heart sounds  Pulmonary:      Effort: Pulmonary effort is normal       Breath sounds: Normal breath sounds  Abdominal:      General: Bowel sounds are normal       Palpations: Abdomen is soft  Musculoskeletal:      Right lower leg: Edema present  Left lower leg: Edema present  Comments: Bilateral lower extremities with mild erythema noted tenderness palpation posterior aspect calves bilateral   Skin:     General: Skin is warm and dry  Capillary Refill: Capillary refill takes less than 2 seconds     Neurological: Mental Status: She is alert and oriented to person, place, and time  Psychiatric:         Behavior: Behavior normal          Vital Signs  ED Triage Vitals [04/17/21 1223]   Temperature Pulse Respirations Blood Pressure SpO2   98 3 °F (36 8 °C) 89 16 152/91 98 %      Temp Source Heart Rate Source Patient Position - Orthostatic VS BP Location FiO2 (%)   Oral Monitor Lying Right arm --      Pain Score       No Pain           Vitals:    04/17/21 1223   BP: 152/91   Pulse: 89   Patient Position - Orthostatic VS: Lying         Visual Acuity      ED Medications  Medications - No data to display    Diagnostic Studies  Results Reviewed     Procedure Component Value Units Date/Time    COVID19, Influenza A/B, RSV PCR, [facility] [[id number]]  (Normal) Collected: 04/17/21 1231    Lab Status: Final result Specimen: Nares from Nasopharyngeal Swab Updated: 04/17/21 1329     SARS-CoV-2 Negative     INFLUENZA A PCR Negative     INFLUENZA B PCR Negative     RSV PCR Negative    Narrative: This test has been authorized by FDA under an EUA (Emergency Use Assay) for use by authorized laboratories  Clinical caution and judgement should be used with the interpretation of these results with consideration of the clinical impression and other laboratory testing  Testing reported as "Positive" or "Negative" has been proven to be accurate according to standard laboratory validation requirements  All testing is performed with control materials showing appropriate reactivity at standard intervals  VAS lower limb venous duplex study, complete bilateral    (Results Pending)              Procedures  Procedures         ED Course                                           MDM  Number of Diagnoses or Management Options  Diagnosis management comments: Patient was monitored in the emergency department patient patient had rapid COVID screen which was negative    Patient also had DVT screen study of bilateral lower extremities which were negative  Plans discharge patient home recommend she continues with her Keflex for her cellulitis of the legs follow-up with primary care physician this week  Return to the ER for any severe symptoms      Disposition  Final diagnoses:   Cellulitis of lower extremity, unspecified laterality     Time reflects when diagnosis was documented in both MDM as applicable and the Disposition within this note     Time User Action Codes Description Comment    4/17/2021  1:39 PM Pearletha Angelucci Add [L03 119] Cellulitis of lower extremity, unspecified laterality       ED Disposition     ED Disposition Condition Date/Time Comment    Discharge Stable Sat Apr 17, 2021  1:39 PM Dawit Mckenzie discharge to home/self care  Follow-up Information     Follow up With Specialties Details Why Contact Info    Stephen Tierney MD Emergency Medicine Schedule an appointment as soon as possible for a visit in 3 days  [address] [place] [address]  [phone]            Patient's Medications   Discharge Prescriptions    No medications on file     No discharge procedures on file      PDMP Review     None          ED Provider  Electronically Signed by           Heri Piña MD  04/17/21 5778

## 2021-05-31 ENCOUNTER — APPOINTMENT (EMERGENCY)
Dept: RADIOLOGY | Facility: HOSPITAL | Age: 51
End: 2021-05-31
Payer: COMMERCIAL

## 2021-05-31 ENCOUNTER — HOSPITAL ENCOUNTER (EMERGENCY)
Facility: HOSPITAL | Age: 51
Discharge: HOME | End: 2021-05-31
Payer: COMMERCIAL

## 2021-05-31 VITALS
WEIGHT: 293 LBS | HEIGHT: 65 IN | BODY MASS INDEX: 48.82 KG/M2 | TEMPERATURE: 98.5 F | HEART RATE: 91 BPM | DIASTOLIC BLOOD PRESSURE: 84 MMHG | SYSTOLIC BLOOD PRESSURE: 141 MMHG | RESPIRATION RATE: 20 BRPM | OXYGEN SATURATION: 100 %

## 2021-05-31 DIAGNOSIS — K29.70 GASTRITIS: Primary | ICD-10-CM

## 2021-05-31 LAB
ALBUMIN SERPL BCP-MCNC: 3.3 G/DL (ref 3.4–4.8)
ALP SERPL-CCNC: 68.6 U/L (ref 35–140)
ALT SERPL W P-5'-P-CCNC: 15 U/L (ref 5–54)
ANION GAP SERPL CALCULATED.3IONS-SCNC: 6 MMOL/L (ref 4–13)
APTT PPP: 24 SECONDS (ref 23–31)
AST SERPL W P-5'-P-CCNC: 20 U/L (ref 15–41)
BASOPHILS # BLD AUTO: 0.02 THOUSANDS/ΜL (ref 0–0.1)
BASOPHILS NFR BLD AUTO: 0 % (ref 0–1)
BILIRUB SERPL-MCNC: 0.27 MG/DL (ref 0.3–1.2)
BILIRUB UR QL STRIP: NEGATIVE
BUN SERPL-MCNC: 8 MG/DL (ref 6–20)
CALCIUM ALBUM COR SERPL-MCNC: 9.5 MG/DL (ref 8.3–10.1)
CALCIUM SERPL-MCNC: 8.9 MG/DL (ref 8.4–10.2)
CHLORIDE SERPL-SCNC: 104 MMOL/L (ref 96–108)
CLARITY UR: ABNORMAL
CO2 SERPL-SCNC: 28 MMOL/L (ref 22–33)
COLOR UR: YELLOW
CREAT SERPL-MCNC: 0.68 MG/DL (ref 0.4–1.1)
EOSINOPHIL # BLD AUTO: 0.13 THOUSAND/ΜL (ref 0–0.61)
EOSINOPHIL NFR BLD AUTO: 2 % (ref 0–6)
ERYTHROCYTE [DISTWIDTH] IN BLOOD BY AUTOMATED COUNT: 12.6 % (ref 11.6–15.1)
GFR SERPL CREATININE-BSD FRML MDRD: 103 ML/MIN/1.73SQ M
GLUCOSE SERPL-MCNC: 97 MG/DL (ref 65–140)
GLUCOSE UR STRIP-MCNC: NEGATIVE MG/DL
HCT VFR BLD AUTO: 38.4 % (ref 34.8–46.1)
HGB BLD-MCNC: 12.2 G/DL (ref 11.5–15.4)
HGB UR QL STRIP.AUTO: NEGATIVE
IMM GRANULOCYTES # BLD AUTO: 0.01 THOUSAND/UL (ref 0–0.2)
IMM GRANULOCYTES NFR BLD AUTO: 0 % (ref 0–2)
INR PPP: 0.96 (ref 0.9–1.1)
KETONES UR STRIP-MCNC: NEGATIVE MG/DL
LEUKOCYTE ESTERASE UR QL STRIP: NEGATIVE
LIPASE SERPL-CCNC: 14 U/L (ref 13–60)
LYMPHOCYTES # BLD AUTO: 0.54 THOUSANDS/ΜL (ref 0.6–4.47)
LYMPHOCYTES NFR BLD AUTO: 9 % (ref 14–44)
MCH RBC QN AUTO: 30 PG (ref 26.8–34.3)
MCHC RBC AUTO-ENTMCNC: 31.8 G/DL (ref 31.4–37.4)
MCV RBC AUTO: 95 FL (ref 82–98)
MONOCYTES # BLD AUTO: 0.58 THOUSAND/ΜL (ref 0.17–1.22)
MONOCYTES NFR BLD AUTO: 10 % (ref 4–12)
NEUTROPHILS # BLD AUTO: 4.69 THOUSANDS/ΜL (ref 1.85–7.62)
NEUTS SEG NFR BLD AUTO: 79 % (ref 43–75)
NITRITE UR QL STRIP: NEGATIVE
PH UR STRIP.AUTO: 8 [PH]
PLATELET # BLD AUTO: 246 THOUSANDS/UL (ref 149–390)
PMV BLD AUTO: 11.4 FL (ref 8.9–12.7)
POTASSIUM SERPL-SCNC: 4 MMOL/L (ref 3.5–5)
PROT SERPL-MCNC: 7 G/DL (ref 6.4–8.3)
PROT UR STRIP-MCNC: NEGATIVE MG/DL
PROTHROMBIN TIME: 10.9 SECONDS (ref 9.5–12.1)
RBC # BLD AUTO: 4.06 MILLION/UL (ref 3.81–5.12)
SODIUM SERPL-SCNC: 138 MMOL/L (ref 133–145)
SP GR UR STRIP.AUTO: 1.01 (ref 1–1.03)
TROPONIN I SERPL-MCNC: <0.03 NG/ML (ref 0–0.07)
UROBILINOGEN UR QL STRIP.AUTO: 0.2 E.U./DL
WBC # BLD AUTO: 5.97 THOUSAND/UL (ref 4.31–10.16)

## 2021-05-31 PROCEDURE — 93005 ELECTROCARDIOGRAM TRACING: CPT

## 2021-05-31 PROCEDURE — G1004 CDSM NDSC: HCPCS

## 2021-05-31 PROCEDURE — 80053 COMPREHEN METABOLIC PANEL: CPT

## 2021-05-31 PROCEDURE — 85730 THROMBOPLASTIN TIME PARTIAL: CPT

## 2021-05-31 PROCEDURE — 81003 URINALYSIS AUTO W/O SCOPE: CPT

## 2021-05-31 PROCEDURE — 83690 ASSAY OF LIPASE: CPT

## 2021-05-31 PROCEDURE — 96375 TX/PRO/DX INJ NEW DRUG ADDON: CPT

## 2021-05-31 PROCEDURE — 36415 COLL VENOUS BLD VENIPUNCTURE: CPT

## 2021-05-31 PROCEDURE — 84484 ASSAY OF TROPONIN QUANT: CPT

## 2021-05-31 PROCEDURE — 71046 X-RAY EXAM CHEST 2 VIEWS: CPT

## 2021-05-31 PROCEDURE — 96374 THER/PROPH/DIAG INJ IV PUSH: CPT

## 2021-05-31 PROCEDURE — 71045 X-RAY EXAM CHEST 1 VIEW: CPT

## 2021-05-31 PROCEDURE — 74176 CT ABD & PELVIS W/O CONTRAST: CPT

## 2021-05-31 PROCEDURE — 85025 COMPLETE CBC W/AUTO DIFF WBC: CPT

## 2021-05-31 PROCEDURE — 85610 PROTHROMBIN TIME: CPT

## 2021-05-31 PROCEDURE — 99285 EMERGENCY DEPT VISIT HI MDM: CPT

## 2021-05-31 RX ORDER — MAGNESIUM HYDROXIDE/ALUMINUM HYDROXICE/SIMETHICONE 120; 1200; 1200 MG/30ML; MG/30ML; MG/30ML
30 SUSPENSION ORAL ONCE
Status: COMPLETED | OUTPATIENT
Start: 2021-05-31 | End: 2021-05-31

## 2021-05-31 RX ORDER — ONDANSETRON 2 MG/ML
4 INJECTION INTRAMUSCULAR; INTRAVENOUS ONCE
Status: COMPLETED | OUTPATIENT
Start: 2021-05-31 | End: 2021-05-31

## 2021-05-31 RX ADMIN — ONDANSETRON 4 MG: 2 INJECTION INTRAMUSCULAR; INTRAVENOUS at 17:50

## 2021-05-31 RX ADMIN — MORPHINE SULFATE 2 MG: 2 INJECTION, SOLUTION INTRAMUSCULAR; INTRAVENOUS at 17:52

## 2021-05-31 RX ADMIN — ALUMINA, MAGNESIA, AND SIMETHICONE ORAL SUSPENSION REGULAR STRENGTH 30 ML: 1200; 1200; 120 SUSPENSION ORAL at 17:52

## 2021-05-31 NOTE — ED PROVIDER NOTES
History  Chief Complaint   Patient presents with    Flank Pain     pt presents to ed via walk in with left sided flank pain N/V also states she has a headache x1 days      79-year-old female history of multiple medical problems including depression migraines seizure disorder behavior health issues presents secondary to 3 days of left flank pain associated with nausea  Patient says she has vomited she thinks it might have been some blood tinge to the emesis 1 time  Patient denies any melena hematochezia patient denies any urinary urgency  Patient denies any hematuria  Patient states she has some nonspecific other symptoms such as myalgias and headache  Patient denies any cough or congestion  She does not appear to be any significant distress on my evaluation  Prior to Admission Medications   Prescriptions Last Dose Informant Patient Reported? Taking?   citalopram (CeleXA) 20 mg tablet   No No   Sig: Take 1 tablet by mouth daily for 30 days   divalproex sodium (DEPAKOTE) 500 mg EC tablet   No No   Sig: Take 1 tablet by mouth 2 (two) times a day for 30 days   furosemide (LASIX) 20 mg tablet   No No   Sig: Take 1 tablet (20 mg total) by mouth daily as needed (peripheral edema) for up to 10 days      Facility-Administered Medications: None       Past Medical History:   Diagnosis Date    Depression     DVT (deep venous thrombosis) (HCC)     Migraine     Psychiatric disorder     Seizures ([facility] [address] )     Suicide attempt [facility])        Past Surgical History:   Procedure Laterality Date     SECTION      CHOLECYSTECTOMY         Family History   Family history unknown: Yes     I have reviewed and agree with the history as documented      E-Cigarette/Vaping    E-Cigarette Use Never User      E-Cigarette/Vaping Substances     Social History     Tobacco Use    Smoking status: Never Smoker    Smokeless tobacco: Never Used   Substance Use Topics    Alcohol use: No    Drug use: No       Review of Systems Constitutional: Negative for chills and fever  HENT: Negative for congestion  Eyes: Negative for visual disturbance  Respiratory: Negative for shortness of breath  Cardiovascular: Negative for chest pain  Gastrointestinal: Positive for abdominal pain, nausea and vomiting  Endocrine: Negative for cold intolerance  Genitourinary: Positive for flank pain  Negative for frequency  Musculoskeletal: Negative for gait problem  Skin: Negative for rash  Neurological: Negative for dizziness  Psychiatric/Behavioral: Negative for behavioral problems and confusion  Physical Exam  Physical Exam  Vitals signs and nursing note reviewed  Constitutional:       Appearance: She is well-developed  HENT:      Head: Normocephalic and atraumatic  Nose: Nose normal       Mouth/Throat:      Mouth: Mucous membranes are dry  Eyes:      Conjunctiva/sclera: Conjunctivae normal       Pupils: Pupils are equal, round, and reactive to light  Neck:      Musculoskeletal: Normal range of motion and neck supple  Cardiovascular:      Rate and Rhythm: Normal rate and regular rhythm  Heart sounds: Normal heart sounds  Pulmonary:      Effort: Pulmonary effort is normal       Breath sounds: Normal breath sounds  Abdominal:      General: Bowel sounds are normal       Palpations: Abdomen is soft  Musculoskeletal: Normal range of motion  Skin:     General: Skin is warm and dry  Capillary Refill: Capillary refill takes less than 2 seconds  Neurological:      Mental Status: She is alert and oriented to person, place, and time     Psychiatric:         Behavior: Behavior normal          Vital Signs  ED Triage Vitals [05/31/21 1711]   Temperature Pulse Respirations Blood Pressure SpO2   98 5 °F (36 9 °C) 96 17 (!) 183/107 100 %      Temp Source Heart Rate Source Patient Position - Orthostatic VS BP Location FiO2 (%)   Oral Monitor Sitting Left arm --      Pain Score       6           Vitals: 05/31/21 1711 05/31/21 1924   BP: (!) 183/107 141/84   Pulse: 96 91   Patient Position - Orthostatic VS: Sitting Lying         Visual Acuity      ED Medications  Medications   ondansetron (ZOFRAN) injection 4 mg (4 mg Intravenous Given 5/31/21 1750)   aluminum-magnesium hydroxide-simethicone (MYLANTA) oral suspension 30 mL (30 mL Oral Given 5/31/21 1752)   morphine injection 2 mg (2 mg Intravenous Given 5/31/21 1752)       Diagnostic Studies  Results Reviewed     Procedure Component Value Units Date/Time    UA w Reflex to Microscopic w Reflex to Culture [[id number]]  (Abnormal) Collected: 05/31/21 1823    Lab Status: Final result Specimen: Urine, Clean Catch Updated: 05/31/21 1845     Color, UA Yellow     Clarity, UA Slightly Cloudy     Specific Gravity, UA 1 015     pH, UA 8 0     Leukocytes, UA Negative     Nitrite, UA Negative     Protein, UA Negative mg/dl      Glucose, UA Negative mg/dl      Ketones, UA Negative mg/dl      Urobilinogen, UA 0 2 E U /dl      Bilirubin, UA Negative     Blood, UA Negative    Protime-INR [[id number]]  (Normal) Collected: 05/31/21 1746    Lab Status: Final result Specimen: Blood from Arm, Left Updated: 05/31/21 1825     Protime 10 9 seconds      INR 0 96    Narrative:      INR Reference Ranges:  No Anticoagulant, Normal:           0 9-1 1  Standard Dose, Oral Anticoagulant:  2 0-3 0  High Dose, Oral Anticoagulant:      2 5-3 5    APTT [[id number]]  (Normal) Collected: 05/31/21 1746    Lab Status: Final result Specimen: Blood from Arm, Left Updated: 05/31/21 1825     PTT 24 seconds     Troponin I [[id number]]  (Normal) Collected: 05/31/21 1746    Lab Status: Final result Specimen: Blood from Arm, Left Updated: 05/31/21 1818     Troponin I <0 03 ng/mL     Comprehensive metabolic panel [[id number]]  (Abnormal) Collected: 05/31/21 1746    Lab Status: Final result Specimen: Blood from Arm, Left Updated: 05/31/21 1817     Sodium 138 mmol/L      Potassium 4 0 mmol/L      Chloride 104 mmol/L CO2 28 mmol/L      ANION GAP 6 mmol/L      BUN 8 mg/dL      Creatinine 0 68 mg/dL      Glucose 97 mg/dL      Calcium 8 9 mg/dL      Corrected Calcium 9 5 mg/dL      AST 20 U/L      ALT 15 U/L      Alkaline Phosphatase 68 6 U/L      Total Protein 7 0 g/dL      Albumin 3 3 g/dL      Total Bilirubin 0 27 mg/dL      eGFR 103 ml/min/1 73sq m     Narrative:      National Kid[employer] guidelines for Chronic Kidney Disease (CKD):     Stage 1 with normal or high GFR (GFR > 90 mL/min/1 73 square meters)    Stage 2 Mild CKD (GFR = 60-89 mL/min/1 73 square meters)    Stage 3A Moderate CKD (GFR = 45-59 mL/min/1 73 square meters)    Stage 3B Moderate CKD (GFR = 30-44 mL/min/1 73 square meters)    Stage 4 Severe CKD (GFR = 15-29 mL/min/1 73 square meters)    Stage 5 End Stage CKD (GFR <15 mL/min/1 73 square meters)  Note: GFR calculation is accurate only with a steady state creatinine    Lipase [[id number]]  (Normal) Collected: 05/31/21 1746    Lab Status: Final result Specimen: Blood from Arm, Left Updated: 05/31/21 1817     Lipase 14 u/L     CBC and differential [[id number]]  (Abnormal) Collected: 05/31/21 1746    Lab Status: Final result Specimen: Blood from Arm, Left Updated: 05/31/21 1800     WBC 5 97 Thousand/uL      RBC 4 06 Million/uL      Hemoglobin 12 2 g/dL      Hematocrit 38 4 %      MCV 95 fL      MCH 30 0 pg      MCHC 31 8 g/dL      RDW 12 6 %      MPV 11 4 fL      Platelets 319 Thousands/uL      Neutrophils Relative 79 %      Immat GRANS % 0 %      Lymphocytes Relative 9 %      Monocytes Relative 10 %      Eosinophils Relative 2 %      Basophils Relative 0 %      Neutrophils Absolute 4 69 Thousands/µL      Immature Grans Absolute 0 01 Thousand/uL      Lymphocytes Absolute 0 54 Thousands/µL      Monocytes Absolute 0 58 Thousand/µL      Eosinophils Absolute 0 13 Thousand/µL      Basophils Absolute 0 02 Thousands/µL                  XR chest 2 views   Final Result by Maximo Suarez MD (06/01 0845)      No acute cardiopulmonary disease  Workstation performed: [id number]         CT abdomen pelvis wo contrast   Final Result by Dennis Resendez MD (05/31 2687)      No urinary calculi identified  Workstation performed: [id number]         XR chest 1 view portable   Final Result by Keeley Hunter MD (06/01 7695)      No acute cardiopulmonary disease  Workstation performed: [id number]                    Procedures  Procedures         ED Course                                           MDM  Number of Diagnoses or Management Options  Gastritis:   Diagnosis management comments: Patient is monitored in the emergency department labs demonstrated no significant findings imaging demonstrated no significant findings patient did respond to 4 mg IV Zofran 2 mg IV morphine and 30 cc Mylanta that was prescribed  Plan is discharge patient home to follow-up with primary care physician this week       Amount and/or Complexity of Data Reviewed  Clinical lab tests: reviewed  Tests in the radiology section of CPT®: reviewed  Decide to obtain previous medical records or to obtain history from someone other than the patient: yes        Disposition  Final diagnoses:   Gastritis     Time reflects when diagnosis was documented in both MDM as applicable and the Disposition within this note     Time User Action Codes Description Comment    5/31/2021  7:18 PM Jessica Alatorre [K29 [id number]] Gastritis       ED Disposition     ED Disposition Condition Date/Time Comment    Discharge Stable Mon May 31, 2021  7:18 PM Stephane Mendoza discharge to home/self care              Follow-up Information     Follow up With Specialties Details Why [address], MD Emergency Medicine   [address] [place] [address]  [phone]            Discharge Medication List as of 5/31/2021  7:18 PM      CONTINUE these medications which have NOT CHANGED    Details   citalopram (CeleXA) 20 mg tablet Take 1 tablet by mouth daily for 30 days, Starting Mon 8/7/2017, Until Wed 9/6/2017, Print      divalproex sodium (DEPAKOTE) 500 mg EC tablet Take 1 tablet by mouth 2 (two) times a day for 30 days, Starting Mon 8/7/2017, Until Wed 9/6/2017, Print      furosemide (LASIX) 20 mg tablet Take 1 tablet (20 mg total) by mouth daily as needed (peripheral edema) for up to 10 days, Starting Sat 4/10/2021, Until Tue 4/20/2021, Normal           No discharge procedures on file      PDMP Review     None          ED Provider  Electronically Signed by           Emile Umana MD  05/31/21 951 [facility] Yumi Sun MD  06/05/21 7010

## 2021-05-31 NOTE — ED CARE HANDOFF
Emergency Department Sign Out Note              ED Course as of May 31 1919   Mon May 31, 2021   1918 The patient was signed out to me at change of shift pending a CT of the abdomen and pelvis  The patient had lab work that was significant for normal white count and normal hemoglobin  Compared to her baseline hemoglobin of 12 2, hemoglobin is 12 2 today  The patient had a negative troponin and nonischemic EKG  The patient had a CT of the abdomen and pelvis that showed no acute abnormality  The patient was re-evaluated and had no further pain and had not vomited again in the emergency department  Patient was discharged with follow-up to her primary care physician  Procedures  MDM    Disposition  Final diagnoses:   Gastritis     Time reflects when diagnosis was documented in both MDM as applicable and the Disposition within this note     Time User Action Codes Description Comment    5/31/2021  7:18 PM Ian Alatorre [[id number]] Gastritis       ED Disposition     ED Disposition Condition Date/Time Comment    Discharge Stable Mon May 31, 2021  7:18 PM Jaqui Peralta discharge to home/self care  Follow-up Information     Follow up With Specialties Details Why [address], MD Emergency Medicine   [address] [place] [address]  [phone]          Patient's Medications   Discharge Prescriptions    No medications on file     No discharge procedures on file         ED Provider  Electronically Signed by     Nick David DO  05/31/21 1919

## 2021-06-01 ENCOUNTER — APPOINTMENT (EMERGENCY)
Dept: CT IMAGING | Facility: HOSPITAL | Age: 51
End: 2021-06-01
Payer: COMMERCIAL

## 2021-06-01 LAB
ATRIAL RATE: 85 BPM
P AXIS: 28 DEGREES
PR INTERVAL: 152 MS
QRS AXIS: 19 DEGREES
QRSD INTERVAL: 89 MS
QT INTERVAL: 394 MS
QTC INTERVAL: 469 MS
T WAVE AXIS: 36 DEGREES
VENTRICULAR RATE: 85 BPM

## 2021-06-01 PROCEDURE — 93010 ELECTROCARDIOGRAM REPORT: CPT | Performed by: INTERNAL MEDICINE

## 2021-06-19 ENCOUNTER — APPOINTMENT (EMERGENCY)
Dept: RADIOLOGY | Facility: HOSPITAL | Age: 51
End: 2021-06-19
Payer: COMMERCIAL

## 2021-06-19 ENCOUNTER — HOSPITAL ENCOUNTER (EMERGENCY)
Facility: HOSPITAL | Age: 51
Discharge: HOME | End: 2021-06-20
Attending: EMERGENCY MEDICINE | Admitting: EMERGENCY MEDICINE
Payer: COMMERCIAL

## 2021-06-19 ENCOUNTER — APPOINTMENT (EMERGENCY)
Dept: CT IMAGING | Facility: HOSPITAL | Age: 51
End: 2021-06-19
Payer: COMMERCIAL

## 2021-06-19 VITALS
OXYGEN SATURATION: 95 % | HEART RATE: 105 BPM | BODY MASS INDEX: 48.82 KG/M2 | RESPIRATION RATE: 20 BRPM | SYSTOLIC BLOOD PRESSURE: 168 MMHG | TEMPERATURE: 98.5 F | WEIGHT: 293 LBS | DIASTOLIC BLOOD PRESSURE: 92 MMHG | HEIGHT: 65 IN

## 2021-06-19 DIAGNOSIS — J18.9 ACUTE PNEUMONIA: Primary | ICD-10-CM

## 2021-06-19 LAB
ALBUMIN SERPL BCP-MCNC: 3.6 G/DL (ref 3.4–4.8)
ALP SERPL-CCNC: 92.6 U/L (ref 35–140)
ALT SERPL W P-5'-P-CCNC: 24 U/L (ref 5–54)
ANION GAP SERPL CALCULATED.3IONS-SCNC: 6 MMOL/L (ref 4–13)
AST SERPL W P-5'-P-CCNC: 21 U/L (ref 15–41)
BASOPHILS # BLD AUTO: 0.03 THOUSANDS/ΜL (ref 0–0.1)
BASOPHILS NFR BLD AUTO: 0 % (ref 0–1)
BILIRUB SERPL-MCNC: 0.25 MG/DL (ref 0.3–1.2)
BILIRUB UR QL STRIP: NEGATIVE
BUN SERPL-MCNC: 16 MG/DL (ref 6–20)
CALCIUM SERPL-MCNC: 8.8 MG/DL (ref 8.4–10.2)
CHLORIDE SERPL-SCNC: 102 MMOL/L (ref 96–108)
CLARITY UR: CLEAR
CO2 SERPL-SCNC: 29 MMOL/L (ref 22–33)
COLOR UR: YELLOW
CREAT SERPL-MCNC: 0.92 MG/DL (ref 0.4–1.1)
EOSINOPHIL # BLD AUTO: 0.16 THOUSAND/ΜL (ref 0–0.61)
EOSINOPHIL NFR BLD AUTO: 2 % (ref 0–6)
ERYTHROCYTE [DISTWIDTH] IN BLOOD BY AUTOMATED COUNT: 12.9 % (ref 11.6–15.1)
EXT PREG TEST URINE: NORMAL
EXT. CONTROL ED NAV: NORMAL
GFR SERPL CREATININE-BSD FRML MDRD: 73 ML/MIN/1.73SQ M
GLUCOSE SERPL-MCNC: 97 MG/DL (ref 65–140)
GLUCOSE UR STRIP-MCNC: NEGATIVE MG/DL
HCT VFR BLD AUTO: 39.7 % (ref 34.8–46.1)
HGB BLD-MCNC: 12.6 G/DL (ref 11.5–15.4)
HGB UR QL STRIP.AUTO: NEGATIVE
IMM GRANULOCYTES # BLD AUTO: 0.03 THOUSAND/UL (ref 0–0.2)
IMM GRANULOCYTES NFR BLD AUTO: 0 % (ref 0–2)
KETONES UR STRIP-MCNC: NEGATIVE MG/DL
LEUKOCYTE ESTERASE UR QL STRIP: NEGATIVE
LIPASE SERPL-CCNC: 17 U/L (ref 13–60)
LYMPHOCYTES # BLD AUTO: 1.19 THOUSANDS/ΜL (ref 0.6–4.47)
LYMPHOCYTES NFR BLD AUTO: 14 % (ref 14–44)
MAGNESIUM SERPL-MCNC: 2.3 MG/DL (ref 1.6–2.6)
MCH RBC QN AUTO: 29.6 PG (ref 26.8–34.3)
MCHC RBC AUTO-ENTMCNC: 31.7 G/DL (ref 31.4–37.4)
MCV RBC AUTO: 93 FL (ref 82–98)
MONOCYTES # BLD AUTO: 0.62 THOUSAND/ΜL (ref 0.17–1.22)
MONOCYTES NFR BLD AUTO: 8 % (ref 4–12)
NEUTROPHILS # BLD AUTO: 6.27 THOUSANDS/ΜL (ref 1.85–7.62)
NEUTS SEG NFR BLD AUTO: 76 % (ref 43–75)
NITRITE UR QL STRIP: NEGATIVE
PH UR STRIP.AUTO: 6 [PH]
PLATELET # BLD AUTO: 212 THOUSANDS/UL (ref 149–390)
PMV BLD AUTO: 11.6 FL (ref 8.9–12.7)
POTASSIUM SERPL-SCNC: 3.6 MMOL/L (ref 3.5–5)
PROT SERPL-MCNC: 7.5 G/DL (ref 6.4–8.3)
PROT UR STRIP-MCNC: NEGATIVE MG/DL
RBC # BLD AUTO: 4.25 MILLION/UL (ref 3.81–5.12)
SODIUM SERPL-SCNC: 137 MMOL/L (ref 133–145)
SP GR UR STRIP.AUTO: <=1.005 (ref 1–1.03)
UROBILINOGEN UR QL STRIP.AUTO: 0.2 E.U./DL
WBC # BLD AUTO: 8.3 THOUSAND/UL (ref 4.31–10.16)

## 2021-06-19 PROCEDURE — 93005 ELECTROCARDIOGRAM TRACING: CPT

## 2021-06-19 PROCEDURE — 83690 ASSAY OF LIPASE: CPT | Performed by: EMERGENCY MEDICINE

## 2021-06-19 PROCEDURE — 96361 HYDRATE IV INFUSION ADD-ON: CPT

## 2021-06-19 PROCEDURE — 81003 URINALYSIS AUTO W/O SCOPE: CPT | Performed by: EMERGENCY MEDICINE

## 2021-06-19 PROCEDURE — 99284 EMERGENCY DEPT VISIT MOD MDM: CPT

## 2021-06-19 PROCEDURE — 71260 CT THORAX DX C+: CPT

## 2021-06-19 PROCEDURE — 71045 X-RAY EXAM CHEST 1 VIEW: CPT

## 2021-06-19 PROCEDURE — 96374 THER/PROPH/DIAG INJ IV PUSH: CPT

## 2021-06-19 PROCEDURE — 80053 COMPREHEN METABOLIC PANEL: CPT | Performed by: EMERGENCY MEDICINE

## 2021-06-19 PROCEDURE — 83735 ASSAY OF MAGNESIUM: CPT | Performed by: EMERGENCY MEDICINE

## 2021-06-19 PROCEDURE — G1004 CDSM NDSC: HCPCS

## 2021-06-19 PROCEDURE — 81025 URINE PREGNANCY TEST: CPT | Performed by: EMERGENCY MEDICINE

## 2021-06-19 PROCEDURE — 99285 EMERGENCY DEPT VISIT HI MDM: CPT | Performed by: EMERGENCY MEDICINE

## 2021-06-19 PROCEDURE — 36415 COLL VENOUS BLD VENIPUNCTURE: CPT | Performed by: EMERGENCY MEDICINE

## 2021-06-19 PROCEDURE — 96375 TX/PRO/DX INJ NEW DRUG ADDON: CPT

## 2021-06-19 PROCEDURE — 85025 COMPLETE CBC W/AUTO DIFF WBC: CPT | Performed by: EMERGENCY MEDICINE

## 2021-06-19 PROCEDURE — 74177 CT ABD & PELVIS W/CONTRAST: CPT

## 2021-06-19 RX ORDER — HYDROMORPHONE HCL/PF 1 MG/ML
1 SYRINGE (ML) INJECTION ONCE
Status: COMPLETED | OUTPATIENT
Start: 2021-06-19 | End: 2021-06-19

## 2021-06-19 RX ORDER — ONDANSETRON 2 MG/ML
4 INJECTION INTRAMUSCULAR; INTRAVENOUS ONCE
Status: COMPLETED | OUTPATIENT
Start: 2021-06-19 | End: 2021-06-19

## 2021-06-19 RX ADMIN — HYDROMORPHONE HYDROCHLORIDE 1 MG: 1 INJECTION, SOLUTION INTRAMUSCULAR; INTRAVENOUS; SUBCUTANEOUS at 22:47

## 2021-06-19 RX ADMIN — SODIUM CHLORIDE 1000 ML: 0.9 INJECTION, SOLUTION INTRAVENOUS at 22:47

## 2021-06-19 RX ADMIN — ONDANSETRON 4 MG: 2 INJECTION INTRAMUSCULAR; INTRAVENOUS at 22:47

## 2021-06-19 RX ADMIN — IOHEXOL 100 ML: 350 INJECTION, SOLUTION INTRAVENOUS at 23:54

## 2021-06-20 LAB
ATRIAL RATE: 99 BPM
P AXIS: 38 DEGREES
PR INTERVAL: 152 MS
QRS AXIS: 30 DEGREES
QRSD INTERVAL: 94 MS
QT INTERVAL: 370 MS
QTC INTERVAL: 473 MS
T WAVE AXIS: 50 DEGREES
VENTRICULAR RATE: 98 BPM

## 2021-06-20 PROCEDURE — 93010 ELECTROCARDIOGRAM REPORT: CPT | Performed by: INTERNAL MEDICINE

## 2021-06-20 RX ORDER — AMOXICILLIN AND CLAVULANATE POTASSIUM 875; 125 MG/1; MG/1
1 TABLET, FILM COATED ORAL ONCE
Status: COMPLETED | OUTPATIENT
Start: 2021-06-20 | End: 2021-06-20

## 2021-06-20 RX ORDER — AZITHROMYCIN 250 MG/1
500 TABLET, FILM COATED ORAL ONCE
Status: COMPLETED | OUTPATIENT
Start: 2021-06-20 | End: 2021-06-20

## 2021-06-20 RX ORDER — AZITHROMYCIN 250 MG/1
250 TABLET, FILM COATED ORAL DAILY
Qty: 4 TABLET | Refills: 0 | Status: SHIPPED | OUTPATIENT
Start: 2021-06-21 | End: 2021-06-25

## 2021-06-20 RX ORDER — AMOXICILLIN AND CLAVULANATE POTASSIUM 875; 125 MG/1; MG/1
1 TABLET, FILM COATED ORAL EVERY 12 HOURS
Qty: 9 TABLET | Refills: 0 | Status: SHIPPED | OUTPATIENT
Start: 2021-06-20 | End: 2021-06-20 | Stop reason: ALTCHOICE

## 2021-06-20 RX ORDER — AMOXICILLIN AND CLAVULANATE POTASSIUM 875; 125 MG/1; MG/1
1 TABLET, FILM COATED ORAL EVERY 12 HOURS
Qty: 9 TABLET | Refills: 0 | Status: SHIPPED | OUTPATIENT
Start: 2021-06-20 | End: 2021-06-25

## 2021-06-20 RX ORDER — AZITHROMYCIN 250 MG/1
250 TABLET, FILM COATED ORAL DAILY
Qty: 4 TABLET | Refills: 0 | Status: SHIPPED | OUTPATIENT
Start: 2021-06-21 | End: 2021-06-20 | Stop reason: ALTCHOICE

## 2021-06-20 RX ADMIN — AZITHROMYCIN MONOHYDRATE 500 MG: 250 TABLET ORAL at 01:43

## 2021-06-20 RX ADMIN — AMOXICILLIN AND CLAVULANATE POTASSIUM 1 TABLET: 875; 125 TABLET, FILM COATED ORAL at 01:43

## 2021-06-20 NOTE — ED PROVIDER NOTES
History  Chief Complaint   Patient presents with    Abdominal Pain     L "side" pain radiating to back and abdomen since last night, one episode of vomiting  Patient is a 45-year-old female seen in the emergency department with concern for sharp left flank pain radiating to the back and abdomen which began last evening  Patient notes the pain is constant, associated with 1 episode of nausea/vomiting  Patient denies having similar symptoms in the past   Patient notes occasionally productive cough at home as well  Patient notes no fever, chest pain, weakness, diarrhea, numbness, tingling  Patient states that she took Tylenol at home for symptom control, without improvement of symptoms  Patient notes no clear exacerbating or alleviating factors for her symptoms  Patient notes history of cholecystectomy in the past           Prior to Admission Medications   Prescriptions Last Dose Informant Patient Reported? Taking?   citalopram (CeleXA) 20 mg tablet 2021 at Unknown time  No Yes   Sig: Take 1 tablet by mouth daily for 30 days   divalproex sodium (DEPAKOTE) 500 mg EC tablet 2021 at Unknown time  No Yes   Sig: Take 1 tablet by mouth 2 (two) times a day for 30 days   furosemide (LASIX) 20 mg tablet   No No   Sig: Take 1 tablet (20 mg total) by mouth daily as needed (peripheral edema) for up to 10 days      Facility-Administered Medications: None       Past Medical History:   Diagnosis Date    Depression     [facility] ([facility]     Migraine     Psychiatric disorder     Seizures ([facility] [address] )     Suicide attempt [facility])        Past Surgical History:   Procedure Laterality Date     SECTION      CHOLECYSTECTOMY         Family History   Family history unknown: Yes     I have reviewed and agree with the history as documented      E-Cigarette/Vaping    E-Cigarette Use Never User      E-Cigarette/Vaping Substances     Social History     Tobacco Use    Smoking status: Never Smoker    Smokeless tobacco: Never Used   Vaping Use    Vaping Use: Never used   Substance Use Topics    Alcohol use: No    Drug use: No       Review of Systems   Constitutional: Negative for chills and fever  HENT: Negative for ear pain and sore throat  Eyes: Negative for pain and visual disturbance  Respiratory: Negative for cough and shortness of breath  Cardiovascular: Negative for chest pain and palpitations  Gastrointestinal: Positive for abdominal pain, nausea and vomiting  Genitourinary: Positive for flank pain  Negative for dysuria and hematuria  Musculoskeletal: Positive for back pain  Negative for arthralgias  Skin: Negative for color change and rash  Neurological: Negative for seizures and syncope  All other systems reviewed and are negative  Physical Exam  Physical Exam  Vitals and nursing note reviewed  Constitutional:       General: She is not in acute distress  Appearance: She is well-developed  HENT:      Head: Normocephalic and atraumatic  Right Ear: External ear normal       Left Ear: External ear normal       Nose: Nose normal       Mouth/Throat:      Pharynx: Oropharynx is clear  Eyes:      General: No scleral icterus  Conjunctiva/sclera: Conjunctivae normal    Cardiovascular:      Rate and Rhythm: Regular rhythm  Tachycardia present  Heart sounds: No murmur heard  Pulmonary:      Effort: Pulmonary effort is normal  No respiratory distress  Breath sounds: Normal breath sounds  Abdominal:      Palpations: Abdomen is soft  Tenderness: There is no abdominal tenderness  There is left CVA tenderness  Musculoskeletal:         General: No deformity or signs of injury  Cervical back: Normal range of motion and neck supple  Skin:     General: Skin is warm and dry  Neurological:      General: No focal deficit present  Mental Status: She is alert and oriented to person, place, and time        Cranial Nerves: No cranial nerve deficit  Sensory: No sensory deficit     Psychiatric:         Mood and Affect: Mood normal          Behavior: Behavior normal          Vital Signs  ED Triage Vitals [06/19/21 2232]   Temperature Pulse Respirations Blood Pressure SpO2   98 5 °F (36 9 °C) 105 20 168/92 95 %      Temp Source Heart Rate Source Patient Position - Orthostatic VS BP Location FiO2 (%)   Oral Monitor Lying Left arm --      Pain Score       6           Vitals:    06/19/21 2232   BP: 168/92   Pulse: 105   Patient Position - Orthostatic VS: Lying         Visual Acuity      ED Medications  Medications   amoxicillin-clavulanate (AUGMENTIN) 875-125 mg per tablet 1 tablet (has no administration in time range)   azithromycin (ZITHROMAX) tablet 500 mg (has no administration in time range)   sodium chloride 0 9 % bolus 1,000 mL (0 mL Intravenous Stopped 6/20/21 0101)   HYDROmorphone (DILAUDID) injection 1 mg (1 mg Intravenous Given 6/19/21 2247)   ondansetron (ZOFRAN) injection 4 mg (4 mg Intravenous Given 6/19/21 2247)   iohexol (OMNIPAQUE) 350 MG/ML injection (SINGLE-DOSE) 100 mL (100 mL Intravenous Given 6/19/21 2354)       Diagnostic Studies  Results Reviewed     Procedure Component Value Units Date/Time    Comprehensive metabolic panel [[id number]]  (Abnormal) Collected: 06/19/21 2245    Lab Status: Final result Specimen: Blood from Arm, Right Updated: 06/19/21 2314     Sodium 137 mmol/L      Potassium 3 6 mmol/L      Chloride 102 mmol/L      CO2 29 mmol/L      ANION GAP 6 mmol/L      BUN 16 mg/dL      Creatinine 0 92 mg/dL      Glucose 97 mg/dL      Calcium 8 8 mg/dL      AST 21 U/L      ALT 24 U/L      Alkaline Phosphatase 92 6 U/L      Total Protein 7 5 g/dL      Albumin 3 6 g/dL      Total Bilirubin 0 25 mg/dL      eGFR 73 ml/min/1 73sq m     Narrative:      [employer] guidelines for Chronic Kidney Disease (CKD):     Stage 1 with normal or high GFR (GFR > 90 mL/min/1 73 square meters)    Stage 2 Mild CKD (GFR = 60-89 mL/min/1 73 square meters)    Stage 3A Moderate CKD (GFR = 45-59 mL/min/1 73 square meters)    Stage 3B Moderate CKD (GFR = 30-44 mL/min/1 73 square meters)    Stage 4 Severe CKD (GFR = 15-29 mL/min/1 73 square meters)    Stage 5 End Stage CKD (GFR <15 mL/min/1 73 square meters)  Note: GFR calculation is accurate only with a steady state creatinine    Magnesium [[id number]]  (Normal) Collected: 06/19/21 2245    Lab Status: Final result Specimen: Blood from Arm, Right Updated: 06/19/21 2314     Magnesium 2 3 mg/dL     Lipase [[id number]]  (Normal) Collected: 06/19/21 2245    Lab Status: Final result Specimen: Blood from Arm, Right Updated: 06/19/21 2314     Lipase 17 u/L     UA w Reflex to Microscopic w Reflex to Culture [[id number]]  (Normal) Collected: 06/19/21 2251    Lab Status: Final result Specimen: Urine, Clean Catch Updated: 06/19/21 2259     Color, UA Yellow     Clarity, UA Clear     Specific Gravity, UA <=1 005     pH, UA 6 0     Leukocytes, UA Negative     Nitrite, UA Negative     Protein, UA Negative mg/dl      Glucose, UA Negative mg/dl      Ketones, UA Negative mg/dl      Urobilinogen, UA 0 2 E U /dl      Bilirubin, UA Negative     Blood, UA Negative    CBC and differential [[id number]]  (Abnormal) Collected: 06/19/21 2245    Lab Status: Final result Specimen: Blood from Arm, Right Updated: 06/19/21 2259     WBC 8 30 Thousand/uL      RBC 4 25 Million/uL      Hemoglobin 12 6 g/dL      Hematocrit 39 7 %      MCV 93 fL      MCH 29 6 pg      MCHC 31 7 g/dL      RDW 12 9 %      MPV 11 6 fL      Platelets 626 Thousands/uL      Neutrophils Relative 76 %      Immat GRANS % 0 %      Lymphocytes Relative 14 %      Monocytes Relative 8 %      Eosinophils Relative 2 %      Basophils Relative 0 %      Neutrophils Absolute 6 27 Thousands/µL      Immature Grans Absolute 0 03 Thousand/uL      Lymphocytes Absolute 1 19 Thousands/µL      Monocytes Absolute 0 62 Thousand/µL      Eosinophils Absolute 0 16 Thousand/µL Basophils Absolute 0 03 Thousands/µL     POCT pregnancy, urine [[id number]]  (Normal) Resulted: 06/19/21 2254    Lab Status: Final result Updated: 06/19/21 2254     EXT PREG TEST UR (Ref: Negative) neg     Control valid                 XR chest 1 view portable   ED Interpretation by Betty Loomis MD (06/19 2321)   No infiltrate or pneumothorax      CT chest abdomen pelvis w contrast    (Results Pending)              Procedures  ECG 12 Lead Documentation Only    Date/Time: 6/19/2021 10:57 PM  Performed by: Betty Loomis MD  Authorized by: Betty Loomis MD     Indications / Diagnosis:  Flank pain  ECG reviewed by me, the ED Provider: yes    Rate:     ECG rate:  98    ECG rate assessment: normal    Rhythm:     Rhythm: sinus rhythm    QRS:     QRS axis:  Normal  ST segments:     ST segments:  Normal  T waves:     T waves: normal    Comments:      Sinus rhythm at 98, normal axis, , QRS 94, , no ST-T wave changes, no evidence of acute ischemia             ED Course                                           MDM  Number of Diagnoses or Management Options  Acute pneumonia  Diagnosis management comments: Patient is a 80-year-old female seen in the emergency department with concern for left flank pain  Patient was treated with medication for symptom control  EKG was obtained and noted  Chest x-ray shows no infiltrate or pneumothorax  Laboratory evaluation unremarkable  CT chest/abdomen/pelvis was obtained to evaluate for acute intrathoracic/intra-abdominal abnormality  CT imaging showed mild streaky opacities in the upper lungs, right greater than left, which may represent infectious or inflammatory process  CT also showed mild varicose bronchiectasis in the upper lungs and mild cardiomegaly  CT also showed bilateral parapelvic renal cysts without evidence of obstructive uropathy  Plan to treat patient with course of oral antibiotics for suspected pneumonia, and have patient follow up with PCP  Patient stable for discharge home  Discharge instructions were reviewed with patient  Amount and/or Complexity of Data Reviewed  Clinical lab tests: ordered and reviewed  Tests in the radiology section of CPT®: ordered and reviewed        Disposition  Final diagnoses:   Acute pneumonia     Time reflects when diagnosis was documented in both MDM as applicable and the Disposition within this note     Time User Action Codes Description Comment    6/20/2021  1:34 AM Yanely Ramesh Add [J18 9] Acute pneumonia       ED Disposition     ED Disposition Condition Date/Time Comment    Discharge Stable Sun Jun 20, 2021  1:33 AM Jahaira Martell discharge to home/self care  Follow-up Information     Follow up With Specialties Details Why Michael Mccarty MD Emergency Medicine Call in 1 day  [address] [place] [address]  [phone]            Patient's Medications   Discharge Prescriptions    AMOXICILLIN-CLAVULANATE (AUGMENTIN) 875-125 MG PER TABLET    Take 1 tablet by mouth every 12 (twelve) hours for 5 days       Start Date: 6/20/2021 End Date: 6/25/2021       Order Dose: 1 tablet       Quantity: 9 tablet    Refills: 0    AZITHROMYCIN (ZITHROMAX) 250 MG TABLET    Take 1 tablet (250 mg total) by mouth daily for 4 days       Start Date: 6/21/2021 End Date: 6/25/2021       Order Dose: 250 mg       Quantity: 4 tablet    Refills: 0     No discharge procedures on file      PDMP Review     None          ED Provider  Electronically Signed by           Sharonda Osborn MD  06/20/21 2053

## 2021-06-20 NOTE — DISCHARGE INSTRUCTIONS
CT imaging showed evidence suggestive of pneumonia  Take antibiotic medication as directed  Follow up with your primary doctor, and return to the emergency department for new or worsening symptoms

## 2021-06-24 PROCEDURE — 93010 ELECTROCARDIOGRAM REPORT: CPT | Performed by: INTERNAL MEDICINE

## 2021-10-08 ENCOUNTER — HOSPITAL ENCOUNTER (EMERGENCY)
Facility: HOSPITAL | Age: 51
Discharge: HOME | End: 2021-10-08
Attending: EMERGENCY MEDICINE | Admitting: EMERGENCY MEDICINE
Payer: COMMERCIAL

## 2021-10-08 VITALS
RESPIRATION RATE: 18 BRPM | DIASTOLIC BLOOD PRESSURE: 93 MMHG | OXYGEN SATURATION: 100 % | TEMPERATURE: 99.4 F | BODY MASS INDEX: 50.02 KG/M2 | SYSTOLIC BLOOD PRESSURE: 113 MMHG | HEIGHT: 64 IN | WEIGHT: 293 LBS | HEART RATE: 98 BPM

## 2021-10-08 DIAGNOSIS — Z77.098 CHEMICAL EXPOSURE OF EYE: Primary | ICD-10-CM

## 2021-10-08 PROCEDURE — 99283 EMERGENCY DEPT VISIT LOW MDM: CPT

## 2021-10-08 PROCEDURE — 99284 EMERGENCY DEPT VISIT MOD MDM: CPT | Performed by: EMERGENCY MEDICINE

## 2021-10-08 RX ORDER — TETRACAINE HYDROCHLORIDE 5 MG/ML
2 SOLUTION OPHTHALMIC ONCE
Status: COMPLETED | OUTPATIENT
Start: 2021-10-08 | End: 2021-10-08

## 2021-10-08 RX ORDER — POLYVINYL ALCOHOL 14 MG/ML
1 SOLUTION/ DROPS OPHTHALMIC AS NEEDED
Qty: 15 ML | Refills: 0 | Status: SHIPPED | OUTPATIENT
Start: 2021-10-08

## 2021-10-08 RX ADMIN — FLUORESCEIN SODIUM 1 STRIP: 1 STRIP OPHTHALMIC at 01:21

## 2021-10-08 RX ADMIN — TETRACAINE HYDROCHLORIDE 2 DROP: 5 SOLUTION OPHTHALMIC at 01:02

## 2022-10-19 ENCOUNTER — VBI (OUTPATIENT)
Dept: ADMINISTRATIVE | Facility: OTHER | Age: 52
End: 2022-10-19

## 2022-10-28 ENCOUNTER — HOSPITAL ENCOUNTER (EMERGENCY)
Facility: HOSPITAL | Age: 52
Discharge: HOME | End: 2022-10-28
Attending: EMERGENCY MEDICINE
Payer: COMMERCIAL

## 2022-10-28 VITALS
HEART RATE: 88 BPM | SYSTOLIC BLOOD PRESSURE: 166 MMHG | WEIGHT: 293 LBS | TEMPERATURE: 98.4 F | BODY MASS INDEX: 62.65 KG/M2 | DIASTOLIC BLOOD PRESSURE: 102 MMHG | RESPIRATION RATE: 20 BRPM | OXYGEN SATURATION: 97 %

## 2022-10-28 DIAGNOSIS — R19.7 NAUSEA VOMITING AND DIARRHEA: ICD-10-CM

## 2022-10-28 DIAGNOSIS — R11.2 NAUSEA VOMITING AND DIARRHEA: ICD-10-CM

## 2022-10-28 DIAGNOSIS — G43.909 MIGRAINE HEADACHE: Primary | ICD-10-CM

## 2022-10-28 LAB
ALBUMIN SERPL BCP-MCNC: 3.5 G/DL (ref 3.5–5)
ALP SERPL-CCNC: 65 U/L (ref 34–104)
ALT SERPL W P-5'-P-CCNC: 14 U/L (ref 7–52)
ANION GAP SERPL CALCULATED.3IONS-SCNC: 5 MMOL/L (ref 4–13)
AST SERPL W P-5'-P-CCNC: 15 U/L (ref 13–39)
BASOPHILS # BLD AUTO: 0.03 THOUSANDS/ÂΜL (ref 0–0.1)
BASOPHILS NFR BLD AUTO: 1 % (ref 0–1)
BILIRUB SERPL-MCNC: 0.45 MG/DL (ref 0.2–1)
BUN SERPL-MCNC: 10 MG/DL (ref 5–25)
CALCIUM SERPL-MCNC: 9.1 MG/DL (ref 8.4–10.2)
CHLORIDE SERPL-SCNC: 100 MMOL/L (ref 96–108)
CO2 SERPL-SCNC: 30 MMOL/L (ref 21–32)
CREAT SERPL-MCNC: 0.71 MG/DL (ref 0.6–1.3)
EOSINOPHIL # BLD AUTO: 0.08 THOUSAND/ÂΜL (ref 0–0.61)
EOSINOPHIL NFR BLD AUTO: 1 % (ref 0–6)
ERYTHROCYTE [DISTWIDTH] IN BLOOD BY AUTOMATED COUNT: 12.4 % (ref 11.6–15.1)
FLUAV RNA RESP QL NAA+PROBE: NEGATIVE
FLUBV RNA RESP QL NAA+PROBE: NEGATIVE
GFR SERPL CREATININE-BSD FRML MDRD: 98 ML/MIN/1.73SQ M
GLUCOSE SERPL-MCNC: 122 MG/DL (ref 65–140)
HCT VFR BLD AUTO: 38.9 % (ref 34.8–46.1)
HGB BLD-MCNC: 12.8 G/DL (ref 11.5–15.4)
IMM GRANULOCYTES # BLD AUTO: 0.03 THOUSAND/UL (ref 0–0.2)
IMM GRANULOCYTES NFR BLD AUTO: 1 % (ref 0–2)
LIPASE SERPL-CCNC: 12 U/L (ref 11–82)
LYMPHOCYTES # BLD AUTO: 0.6 THOUSANDS/ÂΜL (ref 0.6–4.47)
LYMPHOCYTES NFR BLD AUTO: 9 % (ref 14–44)
MCH RBC QN AUTO: 31.3 PG (ref 26.8–34.3)
MCHC RBC AUTO-ENTMCNC: 32.9 G/DL (ref 31.4–37.4)
MCV RBC AUTO: 95 FL (ref 82–98)
MONOCYTES # BLD AUTO: 0.28 THOUSAND/ÂΜL (ref 0.17–1.22)
MONOCYTES NFR BLD AUTO: 4 % (ref 4–12)
NEUTROPHILS # BLD AUTO: 5.46 THOUSANDS/ÂΜL (ref 1.85–7.62)
NEUTS SEG NFR BLD AUTO: 84 % (ref 43–75)
NRBC BLD AUTO-RTO: 0 /100 WBCS
PLATELET # BLD AUTO: 178 THOUSANDS/UL (ref 149–390)
PMV BLD AUTO: 10.4 FL (ref 8.9–12.7)
POTASSIUM SERPL-SCNC: 4.3 MMOL/L (ref 3.5–5.3)
PROT SERPL-MCNC: 6.9 G/DL (ref 6.4–8.4)
RBC # BLD AUTO: 4.09 MILLION/UL (ref 3.81–5.12)
RSV RNA RESP QL NAA+PROBE: NEGATIVE
SARS-COV-2 RNA RESP QL NAA+PROBE: NEGATIVE
SODIUM SERPL-SCNC: 135 MMOL/L (ref 135–147)
WBC # BLD AUTO: 6.48 THOUSAND/UL (ref 4.31–10.16)

## 2022-10-28 PROCEDURE — 0241U HB NFCT DS VIR RESP RNA 4 TRGT: CPT | Performed by: EMERGENCY MEDICINE

## 2022-10-28 PROCEDURE — 36415 COLL VENOUS BLD VENIPUNCTURE: CPT | Performed by: EMERGENCY MEDICINE

## 2022-10-28 PROCEDURE — 80053 COMPREHEN METABOLIC PANEL: CPT | Performed by: EMERGENCY MEDICINE

## 2022-10-28 PROCEDURE — 83690 ASSAY OF LIPASE: CPT | Performed by: EMERGENCY MEDICINE

## 2022-10-28 PROCEDURE — 85025 COMPLETE CBC W/AUTO DIFF WBC: CPT | Performed by: EMERGENCY MEDICINE

## 2022-10-28 RX ORDER — METOCLOPRAMIDE HYDROCHLORIDE 5 MG/ML
10 INJECTION INTRAMUSCULAR; INTRAVENOUS ONCE
Status: COMPLETED | OUTPATIENT
Start: 2022-10-28 | End: 2022-10-28

## 2022-10-28 RX ORDER — DEXAMETHASONE SODIUM PHOSPHATE 10 MG/ML
10 INJECTION, SOLUTION INTRAMUSCULAR; INTRAVENOUS ONCE
Status: COMPLETED | OUTPATIENT
Start: 2022-10-28 | End: 2022-10-28

## 2022-10-28 RX ORDER — ONDANSETRON 4 MG/1
4 TABLET, ORALLY DISINTEGRATING ORAL EVERY 6 HOURS PRN
Qty: 20 TABLET | Refills: 0 | Status: SHIPPED | OUTPATIENT
Start: 2022-10-28

## 2022-10-28 RX ORDER — DIPHENHYDRAMINE HYDROCHLORIDE 50 MG/ML
25 INJECTION INTRAMUSCULAR; INTRAVENOUS ONCE
Status: COMPLETED | OUTPATIENT
Start: 2022-10-28 | End: 2022-10-28

## 2022-10-28 RX ORDER — MAGNESIUM SULFATE HEPTAHYDRATE 40 MG/ML
2 INJECTION, SOLUTION INTRAVENOUS ONCE
Status: COMPLETED | OUTPATIENT
Start: 2022-10-28 | End: 2022-10-28

## 2022-10-28 RX ADMIN — SODIUM CHLORIDE 1000 ML: 0.9 INJECTION, SOLUTION INTRAVENOUS at 14:11

## 2022-10-28 RX ADMIN — MAGNESIUM SULFATE HEPTAHYDRATE 2 G: 40 INJECTION, SOLUTION INTRAVENOUS at 14:51

## 2022-10-28 RX ADMIN — DIPHENHYDRAMINE HYDROCHLORIDE 25 MG: 50 INJECTION, SOLUTION INTRAMUSCULAR; INTRAVENOUS at 14:12

## 2022-10-28 RX ADMIN — METOCLOPRAMIDE 10 MG: 5 INJECTION, SOLUTION INTRAMUSCULAR; INTRAVENOUS at 14:12

## 2022-10-28 RX ADMIN — DEXAMETHASONE SODIUM PHOSPHATE 10 MG: 10 INJECTION INTRAMUSCULAR; INTRAVENOUS at 14:12

## 2022-10-28 NOTE — ED PROVIDER NOTES
History  Chief Complaint   Patient presents with   • Headache     Pt reports headache x 1 week, diarrhea, vomiting and her feet are swelling  60-year-old female with history of migraines presents to the emergency department for evaluation of a headache x1 week  The patient describes it as a right-sided posterior headache with radiation to the front of her head  She states that this feels similar to her migraines in the past   The patient has been treating her symptoms with extra-strength Tylenol with only minimal relief  She reports that over the past week she has also had intermittent episodes nonbloody diarrhea and nonbloody and nonbilious vomiting  She denies blurry vision, fevers, chills, recent travel, rashes, chest pain, shortness of breath and localized numbness, tingling or weakness  HA was gradual onset  No f/c/s  No neck stiffness  No focal neurological symptoms  No temporal artery pain/tenderness  No vision changes  Headaches are not increasing in severity or frequency  Not worse in the AM  No head trauma  Prior to Admission Medications   Prescriptions Last Dose Informant Patient Reported? Taking?   citalopram (CeleXA) 20 mg tablet   No No   Sig: Take 1 tablet by mouth daily for 30 days   divalproex sodium (DEPAKOTE) 500 mg EC tablet   No No   Sig: Take 1 tablet by mouth 2 (two) times a day for 30 days   polyvinyl alcohol (LIQUIFILM TEARS) 1 4 % ophthalmic solution Not Taking at Unknown time  No No   Sig: Administer 1 drop to both eyes as needed for dry eyes   Patient not taking: Reported on 10/28/2022      Facility-Administered Medications: None       Past Medical History:   Diagnosis Date   • Depression    • DVT (deep venous thrombosis) ([facility])    • Migraine    • Psychiatric disorder    • Seizures ([facility] [address] )    • Suicide attempt [facility])        Past Surgical History:   Procedure Laterality Date   •  SECTION     • CHOLECYSTECTOMY         Family History   Family history unknown:  Yes I have reviewed and agree with the history as documented  E-Cigarette/Vaping   • E-Cigarette Use Never User      E-Cigarette/Vaping Substances     Social History     Tobacco Use   • Smoking status: Never Smoker   • Smokeless tobacco: Never Used   Vaping Use   • Vaping Use: Never used   Substance Use Topics   • Alcohol use: Yes   • Drug use: No       Review of Systems   Constitutional: Negative for chills and fever  HENT: Negative for ear pain and sore throat  Eyes: Negative for pain and visual disturbance  Respiratory: Negative for cough and shortness of breath  Cardiovascular: Negative for chest pain and palpitations  Gastrointestinal: Positive for diarrhea, nausea and vomiting  Negative for abdominal pain  Genitourinary: Negative for dysuria and hematuria  Musculoskeletal: Negative for arthralgias and back pain  Skin: Negative for color change and rash  Neurological: Positive for headaches  Negative for seizures and syncope  All other systems reviewed and are negative  Physical Exam  Physical Exam  Vitals and nursing note reviewed  Constitutional:       General: She is not in acute distress  Appearance: She is well-developed  She is obese  HENT:      Head: Normocephalic and atraumatic  Eyes:      Extraocular Movements: Extraocular movements intact  Conjunctiva/sclera: Conjunctivae normal       Pupils: Pupils are equal, round, and reactive to light  Funduscopic exam:     Right eye: No papilledema  Left eye: No papilledema  Cardiovascular:      Rate and Rhythm: Normal rate and regular rhythm  Heart sounds: No murmur heard  Pulmonary:      Effort: Pulmonary effort is normal  No respiratory distress  Breath sounds: Normal breath sounds  Abdominal:      Palpations: Abdomen is soft  Tenderness: There is no abdominal tenderness  Musculoskeletal:      Cervical back: Neck supple  Skin:     General: Skin is warm and dry     Neurological: Mental Status: She is alert and oriented to person, place, and time  Cranial Nerves: Cranial nerves are intact  Sensory: Sensation is intact  Motor: Motor function is intact  Coordination: Coordination is intact  Gait: Gait is intact  Vital Signs  ED Triage Vitals [10/28/22 1328]   Temperature Pulse Respirations Blood Pressure SpO2   98 4 °F (36 9 °C) 88 20 (!) 166/102 97 %      Temp Source Heart Rate Source Patient Position - Orthostatic VS BP Location FiO2 (%)   Oral Monitor -- -- --      Pain Score       10 - Worst Possible Pain           Vitals:    10/28/22 1328   BP: (!) 166/102   Pulse: 88         Visual Acuity      ED Medications  Medications   sodium chloride 0 9 % bolus 1,000 mL (0 mL Intravenous Stopped 10/28/22 1546)   diphenhydrAMINE (BENADRYL) injection 25 mg (25 mg Intravenous Given 10/28/22 1412)   metoclopramide (REGLAN) injection 10 mg (10 mg Intravenous Given 10/28/22 1412)   dexamethasone (PF) (DECADRON) injection 10 mg (10 mg Intravenous Given 10/28/22 1412)   magnesium sulfate 2 g/50 mL IVPB (premix) 2 g (0 g Intravenous Stopped 10/28/22 1547)       Diagnostic Studies  Results Reviewed     Procedure Component Value Units Date/Time    FLU/RSV/COVID - if FLU/RSV clinically relevant [[id number]]  (Normal) Collected: 10/28/22 1413    Lab Status: Final result Specimen: Nares from Nasopharyngeal Swab Updated: 10/28/22 1502     SARS-CoV-2 Negative     INFLUENZA A PCR Negative     INFLUENZA B PCR Negative     RSV PCR Negative    Narrative:      FOR PEDIATRIC PATIENTS - copy/paste COVID Guidelines URL to browser: [email or web address] [email or web address]  ashx    SARS-CoV-2 assay is a Nucleic Acid Amplification assay intended for the  qualitative detection of nucleic acid from SARS-CoV-2 in nasopharyngeal  swabs  Results are for the presumptive identification of SARS-CoV-2 RNA      Positive results are indicative of infection with SARS-CoV-2, the virus  causing COVID-19, but do not rule out bacterial infection or co-infection  with other viruses  Laboratories within the [place] and its  territories are required to report all positive results to the appropriate  public health authorities  Negative results do not preclude SARS-CoV-2  infection and should not be used as the sole basis for treatment or other  patient management decisions  Negative results must be combined with  clinical observations, patient history, and epidemiological information  This test has not been FDA cleared or approved  This test has been authorized by FDA under an Emergency Use Authorization  (EUA)  This test is only authorized for the duration of time the  declaration that circumstances exist justifying the authorization of the  emergency use of an in vitro diagnostic tests for detection of SARS-CoV-2  virus and/or diagnosis of COVID-19 infection under section 564(b)(1) of  the Act, 21 U  S C  494KAF-1(N)(7), unless the authorization is terminated  or revoked sooner  The test has been validated but independent review by FDA  and CLIA is pending  Test performed using [employer] Gene[employer]pert: This RT-PCR assay targets N2,  a region unique to SARS-CoV-2  A conserved region in the E-gene was chosen  for pan-Sarbecovirus detection which includes SARS-CoV-2  According to CMS-2020-01-R, this platform meets the definition of high-throughput technology      Comprehensive metabolic panel [[id number]] Collected: 10/28/22 1412    Lab Status: Final result Specimen: Blood from Arm, Right Updated: 10/28/22 1436     Sodium 135 mmol/L      Potassium 4 3 mmol/L      Chloride 100 mmol/L      CO2 30 mmol/L      ANION GAP 5 mmol/L      BUN 10 mg/dL      Creatinine 0 71 mg/dL      Glucose 122 mg/dL      Calcium 9 1 mg/dL      AST 15 U/L      ALT 14 U/L      Alkaline Phosphatase 65 U/L      Total Protein 6 9 g/dL      Albumin 3 5 g/dL      Total Bilirubin 0 45 mg/dL      eGFR 98 ml/min/1 73sq m Narrative:      National Kidney Disease Foundation guidelines for Chronic Kidney Disease (CKD):   •  Stage 1 with normal or high GFR (GFR > 90 mL/min/1 73 square meters)  •  Stage 2 Mild CKD (GFR = 60-89 mL/min/1 73 square meters)  •  Stage 3A Moderate CKD (GFR = 45-59 mL/min/1 73 square meters)  •  Stage 3B Moderate CKD (GFR = 30-44 mL/min/1 73 square meters)  •  Stage 4 Severe CKD (GFR = 15-29 mL/min/1 73 square meters)  •  Stage 5 End Stage CKD (GFR <15 mL/min/1 73 square meters)  Note: GFR calculation is accurate only with a steady state creatinine    Lipase [[id number]]  (Normal) Collected: 10/28/22 1412    Lab Status: Final result Specimen: Blood from Arm, Right Updated: 10/28/22 1436     Lipase 12 u/L     CBC and differential [[id number]]  (Abnormal) Collected: 10/28/22 1412    Lab Status: Final result Specimen: Blood from Arm, Right Updated: 10/28/22 1420     WBC 6 48 Thousand/uL      RBC 4 09 Million/uL      Hemoglobin 12 8 g/dL      Hematocrit 38 9 %      MCV 95 fL      MCH 31 3 pg      MCHC 32 9 g/dL      RDW 12 4 %      MPV 10 4 fL      Platelets 397 Thousands/uL      nRBC 0 /100 WBCs      Neutrophils Relative 84 %      Immat GRANS % 1 %      Lymphocytes Relative 9 %      Monocytes Relative 4 %      Eosinophils Relative 1 %      Basophils Relative 1 %      Neutrophils Absolute 5 46 Thousands/µL      Immature Grans Absolute 0 03 Thousand/uL      Lymphocytes Absolute 0 60 Thousands/µL      Monocytes Absolute 0 28 Thousand/µL      Eosinophils Absolute 0 08 Thousand/µL      Basophils Absolute 0 03 Thousands/µL                  No orders to display              Procedures  Procedures         ED Course                     MDM  Number of Diagnoses or Management Options  Migraine headache  Nausea vomiting and diarrhea  Diagnosis management comments: Patient is well appearing and neurologically intact  Headache was not acute or maximal in onset   Do not suspect SAH, temporal arteritis, meningitis, encephalitis, CO poisoning, acute angle closure glaucoma, dural venous sinus thrombosis as cause of headache  Do not feel that further imaging or workup (including LP) are warranted at this time  Blood work was grossly unremarkable  The patient was treated symptomatically with a migraine cocktail  On re-evaluation the patient reported that her headache resolved  Patient requesting discharge at this time  All diagnostic studies were discussed with the patient in detail  Recommendation made for to follow up with her PCP  Return precautions were discussed  Patient agrees with the plan for discharge and feels comfortable to go home with proper f/u  Advised to return for worsening or additional problems  Diagnostic tests were reviewed and questions answered  Diagnosis, care plan and treatment options were discussed  The patient understands instructions and will follow up as directed          Disposition  Final diagnoses:   Migraine headache   Nausea vomiting and diarrhea     Time reflects when diagnosis was documented in both MDM as applicable and the Disposition within this note     Time User Action Codes Description Comment    10/28/2022  3:39 PM Mariah Sandifer Add [R51 9] Headache     10/28/2022  3:39 PM Mariah Sandifer Add [A08 812] Migraine headache     10/28/2022  3:39 PM Mariah Sandifer Modify [O81 753] Migraine headache     10/28/2022  3:39 PM Mariah Sandifer Remove [R51 9] Headache     10/28/2022  3:39 PM Mariah Sandifer Add [R11 2,  R19 7] Nausea vomiting and diarrhea       ED Disposition     ED Disposition   Discharge    Condition   Stable    Date/Time   Fri Oct 28, 2022  3:39 PM    Comment              Follow-up Information     Follow up With Specialties Details Why Contact Info Additional Information    Elizabeth Ortiz MD Emergency Medicine Schedule an appointment as soon as possible for a visit   [address] [place] [id number] [address] [facility] Department Emergency Medicine Go to  If symptoms worsen [address] [facility],[address] [phone]  [address] [facility] Emergency Department, [address], [address]          Patient's Medications   Discharge Prescriptions    ONDANSETRON (ZOFRAN ODT) 4 MG DISINTEGRATING TABLET    Take 1 tablet (4 mg total) by mouth every 6 (six) hours as needed for nausea or vomiting       Start Date: 10/28/2022End Date: --       Order Dose: 4 mg       Quantity: 20 tablet    Refills: 0       No discharge procedures on file      PDMP Review     None          ED Provider  Electronically Signed by           Luca Bustamante MD  10/28/22 0798

## 2023-02-07 ENCOUNTER — HOSPITAL ENCOUNTER (EMERGENCY)
Facility: HOSPITAL | Age: 53
Discharge: HOME | End: 2023-02-07
Attending: EMERGENCY MEDICINE

## 2023-02-07 VITALS
OXYGEN SATURATION: 97 % | SYSTOLIC BLOOD PRESSURE: 144 MMHG | HEART RATE: 88 BPM | DIASTOLIC BLOOD PRESSURE: 88 MMHG | TEMPERATURE: 98.3 F | RESPIRATION RATE: 20 BRPM

## 2023-02-07 DIAGNOSIS — R10.9 ABDOMINAL PAIN: ICD-10-CM

## 2023-02-07 DIAGNOSIS — G43.909 MIGRAINE HEADACHE: Primary | ICD-10-CM

## 2023-02-07 LAB
ALBUMIN SERPL BCP-MCNC: 3.8 G/DL (ref 3.5–5)
ALP SERPL-CCNC: 78 U/L (ref 34–104)
ALT SERPL W P-5'-P-CCNC: 24 U/L (ref 7–52)
ANION GAP SERPL CALCULATED.3IONS-SCNC: 6 MMOL/L (ref 4–13)
AST SERPL W P-5'-P-CCNC: 30 U/L (ref 13–39)
BASOPHILS # BLD AUTO: 0.03 THOUSANDS/ÂΜL (ref 0–0.1)
BASOPHILS NFR BLD AUTO: 1 % (ref 0–1)
BILIRUB SERPL-MCNC: 0.5 MG/DL (ref 0.2–1)
BUN SERPL-MCNC: 10 MG/DL (ref 5–25)
CALCIUM SERPL-MCNC: 9 MG/DL (ref 8.4–10.2)
CHLORIDE SERPL-SCNC: 104 MMOL/L (ref 96–108)
CO2 SERPL-SCNC: 29 MMOL/L (ref 21–32)
CREAT SERPL-MCNC: 0.76 MG/DL (ref 0.6–1.3)
EOSINOPHIL # BLD AUTO: 0.12 THOUSAND/ÂΜL (ref 0–0.61)
EOSINOPHIL NFR BLD AUTO: 2 % (ref 0–6)
ERYTHROCYTE [DISTWIDTH] IN BLOOD BY AUTOMATED COUNT: 12.6 % (ref 11.6–15.1)
FLUAV RNA RESP QL NAA+PROBE: NEGATIVE
FLUBV RNA RESP QL NAA+PROBE: NEGATIVE
GFR SERPL CREATININE-BSD FRML MDRD: 91 ML/MIN/1.73SQ M
GLUCOSE SERPL-MCNC: 104 MG/DL (ref 65–140)
HCT VFR BLD AUTO: 43.1 % (ref 34.8–46.1)
HGB BLD-MCNC: 14.2 G/DL (ref 11.5–15.4)
IMM GRANULOCYTES # BLD AUTO: 0.01 THOUSAND/UL (ref 0–0.2)
IMM GRANULOCYTES NFR BLD AUTO: 0 % (ref 0–2)
LIPASE SERPL-CCNC: 27 U/L (ref 11–82)
LYMPHOCYTES # BLD AUTO: 0.83 THOUSANDS/ÂΜL (ref 0.6–4.47)
LYMPHOCYTES NFR BLD AUTO: 15 % (ref 14–44)
MCH RBC QN AUTO: 30.7 PG (ref 26.8–34.3)
MCHC RBC AUTO-ENTMCNC: 32.9 G/DL (ref 31.4–37.4)
MCV RBC AUTO: 93 FL (ref 82–98)
MONOCYTES # BLD AUTO: 0.38 THOUSAND/ÂΜL (ref 0.17–1.22)
MONOCYTES NFR BLD AUTO: 7 % (ref 4–12)
NEUTROPHILS # BLD AUTO: 4.24 THOUSANDS/ÂΜL (ref 1.85–7.62)
NEUTS SEG NFR BLD AUTO: 75 % (ref 43–75)
NRBC BLD AUTO-RTO: 0 /100 WBCS
PLATELET # BLD AUTO: 177 THOUSANDS/UL (ref 149–390)
PMV BLD AUTO: 12 FL (ref 8.9–12.7)
POTASSIUM SERPL-SCNC: 4 MMOL/L (ref 3.5–5.3)
PROT SERPL-MCNC: 7 G/DL (ref 6.4–8.4)
RBC # BLD AUTO: 4.63 MILLION/UL (ref 3.81–5.12)
RSV RNA RESP QL NAA+PROBE: NEGATIVE
SARS-COV-2 RNA RESP QL NAA+PROBE: NEGATIVE
SODIUM SERPL-SCNC: 139 MMOL/L (ref 135–147)
WBC # BLD AUTO: 5.61 THOUSAND/UL (ref 4.31–10.16)

## 2023-02-07 RX ORDER — DIPHENHYDRAMINE HYDROCHLORIDE 50 MG/ML
25 INJECTION INTRAMUSCULAR; INTRAVENOUS ONCE
Status: DISCONTINUED | OUTPATIENT
Start: 2023-02-07 | End: 2023-02-07 | Stop reason: HOSPADM

## 2023-02-07 RX ORDER — MAGNESIUM SULFATE HEPTAHYDRATE 40 MG/ML
2 INJECTION, SOLUTION INTRAVENOUS ONCE
Status: COMPLETED | OUTPATIENT
Start: 2023-02-07 | End: 2023-02-07

## 2023-02-07 RX ORDER — DEXAMETHASONE SODIUM PHOSPHATE 10 MG/ML
10 INJECTION, SOLUTION INTRAMUSCULAR; INTRAVENOUS ONCE
Status: COMPLETED | OUTPATIENT
Start: 2023-02-07 | End: 2023-02-07

## 2023-02-07 RX ORDER — METOCLOPRAMIDE HYDROCHLORIDE 5 MG/ML
10 INJECTION INTRAMUSCULAR; INTRAVENOUS ONCE
Status: COMPLETED | OUTPATIENT
Start: 2023-02-07 | End: 2023-02-07

## 2023-02-07 RX ADMIN — SODIUM CHLORIDE 1000 ML: 0.9 INJECTION, SOLUTION INTRAVENOUS at 20:22

## 2023-02-07 RX ADMIN — MAGNESIUM SULFATE HEPTAHYDRATE 2 G: 40 INJECTION, SOLUTION INTRAVENOUS at 20:27

## 2023-02-07 RX ADMIN — DEXAMETHASONE SODIUM PHOSPHATE 10 MG: 10 INJECTION, SOLUTION INTRAMUSCULAR; INTRAVENOUS at 20:22

## 2023-02-07 RX ADMIN — METOCLOPRAMIDE 10 MG: 5 INJECTION, SOLUTION INTRAMUSCULAR; INTRAVENOUS at 20:22

## 2023-02-08 ENCOUNTER — VBI (OUTPATIENT)
Dept: ADMINISTRATIVE | Facility: OTHER | Age: 53
End: 2023-02-08

## 2023-02-08 NOTE — ED NOTES
Discharge reviewed with patient  Patient verbalized understanding and has no further questions at this time  Patient ambulatory off unit with steady gait         Kailash Holman RN  02/07/23 3062

## 2023-02-08 NOTE — ED PROVIDER NOTES
History  Chief Complaint   Patient presents with   • Headache     Patient complains of a week of loss of taste and headache  Patient denies any covid contact or other symptoms  79-year-old female with history of migraines presents to the emergency department for evaluation of a headache  The patient states that she has had a decreased sense of taste over the past several days and developed a headache 2 days ago that she describes as a right-sided posterior headache with radiation to the front of her head  She reports that this does feel similar to headaches that she has had in the past   She has been treating her symptoms with Tylenol with moderate relief  She states that the dose of Tylenol that she took was earlier this morning  She also reports that she has been having some intermittent right-sided abdominal pain over the past couple of days as well  Currently states that the pain is minimal   She denies blurry vision, fevers, chills, nausea, vomiting, diarrhea, recent travel, urinary symptoms and localized numbness, tingling or weakness  HA was gradual onset  No f/c/s  No neck stiffness  No focal neurological symptoms  No temporal artery pain/tenderness  No vision changes  Headaches are not increasing in severity or frequency  Not worse in the AM                Prior to Admission Medications   Prescriptions Last Dose Informant Patient Reported?  Taking?   citalopram (CeleXA) 20 mg tablet   No No   Sig: Take 1 tablet by mouth daily for 30 days   divalproex sodium (DEPAKOTE) 500 mg EC tablet   No No   Sig: Take 1 tablet by mouth 2 (two) times a day for 30 days   ondansetron (Zofran ODT) 4 mg disintegrating tablet   No No   Sig: Take 1 tablet (4 mg total) by mouth every 6 (six) hours as needed for nausea or vomiting   polyvinyl alcohol (LIQUIFILM TEARS) 1 4 % ophthalmic solution   No No   Sig: Administer 1 drop to both eyes as needed for dry eyes   Patient not taking: Reported on 10/28/2022 Facility-Administered Medications: None       Past Medical History:   Diagnosis Date   • Depression    • DVT (deep venous thrombosis) ([facility])    • Migraine    • Psychiatric disorder    • Seizures ([address] )    • Suicide attempt [facility])        Past Surgical History:   Procedure Laterality Date   •  SECTION     • CHOLECYSTECTOMY         Family History   Family history unknown: Yes     I have reviewed and agree with the history as documented  E-Cigarette/Vaping   • E-Cigarette Use Never User      E-Cigarette/Vaping Substances     Social History     Tobacco Use   • Smoking status: Never   • Smokeless tobacco: Never   Vaping Use   • Vaping Use: Never used   Substance Use Topics   • Alcohol use: Yes   • Drug use: No       Review of Systems   Constitutional: Negative for chills and fever  HENT: Negative for ear pain and sore throat  Eyes: Negative for pain and visual disturbance  Respiratory: Negative for cough and shortness of breath  Cardiovascular: Negative for chest pain and palpitations  Gastrointestinal: Positive for abdominal pain  Negative for vomiting  Genitourinary: Negative for dysuria and hematuria  Musculoskeletal: Negative for arthralgias and back pain  Skin: Negative for color change and rash  Neurological: Positive for headaches  Negative for seizures and syncope  All other systems reviewed and are negative  Physical Exam  Physical Exam  Vitals and nursing note reviewed  Constitutional:       General: She is not in acute distress  Appearance: She is well-developed  She is obese  HENT:      Head: Normocephalic and atraumatic  Right Ear: External ear normal       Left Ear: External ear normal       Nose: Nose normal       Mouth/Throat:      Mouth: Mucous membranes are moist    Eyes:      Extraocular Movements: Extraocular movements intact  Conjunctiva/sclera: Conjunctivae normal       Pupils: Pupils are equal, round, and reactive to light     Cardiovascular: Rate and Rhythm: Normal rate and regular rhythm  Heart sounds: No murmur heard  Pulmonary:      Effort: Pulmonary effort is normal  No respiratory distress  Breath sounds: Normal breath sounds  Abdominal:      Palpations: Abdomen is soft  Tenderness: There is no abdominal tenderness  Musculoskeletal:         General: No swelling  Cervical back: Normal range of motion and neck supple  Skin:     General: Skin is warm and dry  Capillary Refill: Capillary refill takes less than 2 seconds  Neurological:      Mental Status: She is alert and oriented to person, place, and time  Cranial Nerves: Cranial nerves 2-12 are intact  Sensory: Sensation is intact  Motor: Motor function is intact  Coordination: Coordination is intact     Psychiatric:         Mood and Affect: Mood normal          Behavior: Behavior normal          Vital Signs  ED Triage Vitals [02/07/23 1933]   Temperature Pulse Respirations Blood Pressure SpO2   98 3 °F (36 8 °C) (!) 112 20 164/94 96 %      Temp Source Heart Rate Source Patient Position - Orthostatic VS BP Location FiO2 (%)   Oral Monitor Lying Right arm --      Pain Score       --           Vitals:    02/07/23 1933 02/07/23 2100   BP: 164/94 144/88   Pulse: (!) 112 88   Patient Position - Orthostatic VS: Lying          Visual Acuity      ED Medications  Medications   metoclopramide (REGLAN) injection 10 mg (10 mg Intravenous Given 2/7/23 2022)   dexamethasone (PF) (DECADRON) injection 10 mg (10 mg Intravenous Given 2/7/23 2022)   magnesium sulfate 2 g/50 mL IVPB (premix) 2 g (0 g Intravenous Stopped 2/7/23 2129)   sodium chloride 0 9 % bolus 1,000 mL (0 mL Intravenous Stopped 2/7/23 2129)       Diagnostic Studies  Results Reviewed     Procedure Component Value Units Date/Time    Comprehensive metabolic panel [[id number]] Collected: 02/07/23 2019    Lab Status: Final result Specimen: Blood from Arm, Right Updated: 02/07/23 2056     Sodium 139 mmol/L      Potassium 4 0 mmol/L      Chloride 104 mmol/L      CO2 29 mmol/L      ANION GAP 6 mmol/L      BUN 10 mg/dL      Creatinine 0 76 mg/dL      Glucose 104 mg/dL      Calcium 9 0 mg/dL      AST 30 U/L      ALT 24 U/L      Alkaline Phosphatase 78 U/L      Total Protein 7 0 g/dL      Albumin 3 8 g/dL      Total Bilirubin 0 50 mg/dL      eGFR 91 ml/min/1 73sq m     Narrative:      [facility] guidelines for Chronic Kidney Disease (CKD):   •  Stage 1 with normal or high GFR (GFR > 90 mL/min/1 73 square meters)  •  Stage 2 Mild CKD (GFR = 60-89 mL/min/1 73 square meters)  •  Stage 3A Moderate CKD (GFR = 45-59 mL/min/1 73 square meters)  •  Stage 3B Moderate CKD (GFR = 30-44 mL/min/1 73 square meters)  •  Stage 4 Severe CKD (GFR = 15-29 mL/min/1 73 square meters)  •  Stage 5 End Stage CKD (GFR <15 mL/min/1 73 square meters)  Note: GFR calculation is accurate only with a steady state creatinine    Lipase [[id number]]  (Normal) Collected: 02/07/23 2019    Lab Status: Final result Specimen: Blood from Arm, Right Updated: 02/07/23 2056     Lipase 27 u/L     FLU/RSV/COVID - if FLU/RSV clinically relevant [[id number]]  (Normal) Collected: 02/07/23 1959    Lab Status: Final result Specimen: Nares from Nose Updated: 02/07/23 2047     SARS-CoV-2 Negative     INFLUENZA A PCR Negative     INFLUENZA B PCR Negative     RSV PCR Negative    Narrative:      FOR PEDIATRIC PATIENTS - copy/paste COVID Guidelines URL to browser: [email or web address] [email or web address]  [email or web address]    SARS-CoV-2 assay is a Nucleic Acid Amplification assay intended for the  qualitative detection of nucleic acid from SARS-CoV-2 in nasopharyngeal  swabs  Results are for the presumptive identification of SARS-CoV-2 RNA  Positive results are indicative of infection with SARS-CoV-2, the virus  causing COVID-19, but do not rule out bacterial infection or co-infection  with other viruses   Laboratories within the [facility] and its  territories are required to report all positive results to the appropriate  public health authorities  Negative results do not preclude SARS-CoV-2  infection and should not be used as the sole basis for treatment or other  patient management decisions  Negative results must be combined with  clinical observations, patient history, and epidemiological information  This test has not been FDA cleared or approved  This test has been authorized by FDA under an Emergency Use Authorization  (EUA)  This test is only authorized for the duration of time the  declaration that circumstances exist justifying the authorization of the  emergency use of an in vitro diagnostic tests for detection of SARS-CoV-2  virus and/or diagnosis of COVID-19 infection under section 564(b)(1) of  the Act, 21 U  S C  734BCW-3(D)(5), unless the authorization is terminated  or revoked sooner  The test has been validated but independent review by FDA  and CLIA is pending  Test performed using [employer] Gene[employer]pert: This RT-PCR assay targets N2,  a region unique to SARS-CoV-2  A conserved region in the E-gene was chosen  for pan-Sarbecovirus detection which includes SARS-CoV-2  According to CMS-2020-01-R, this platform meets the definition of high-throughput technology      CBC and differential [[id number]] Collected: 02/07/23 2019    Lab Status: Final result Specimen: Blood from Arm, Right Updated: 02/07/23 2039     WBC 5 61 Thousand/uL      RBC 4 63 Million/uL      Hemoglobin 14 2 g/dL      Hematocrit 43 1 %      MCV 93 fL      MCH 30 7 pg      MCHC 32 9 g/dL      RDW 12 6 %      MPV 12 0 fL      Platelets 446 Thousands/uL      nRBC 0 /100 WBCs      Neutrophils Relative 75 %      Immat GRANS % 0 %      Lymphocytes Relative 15 %      Monocytes Relative 7 %      Eosinophils Relative 2 %      Basophils Relative 1 %      Neutrophils Absolute 4 24 Thousands/µL      Immature Grans Absolute 0 01 Thousand/uL      Lymphocytes Absolute 0 83 Thousands/µL      Monocytes Absolute 0 38 Thousand/µL      Eosinophils Absolute 0 12 Thousand/µL      Basophils Absolute 0 03 Thousands/µL                  No orders to display              Procedures  Procedures         ED Course                     Medical Decision Making  15-year-old female presented to the emergency department for evaluation of a headache and abdominal pain  On arrival the patient was awake, alert, oriented and in no acute distress  Physical exam was benign  Based on the patient's comorbidities and presenting symptoms a broad differential was considered  The patient had no red flag signs regarding her headache  No indication for advanced imaging  Blood work done in the emergency department was reassuring  The patient's abdominal exam continues to be benign  No indication for CT scan of the abdomen at this time  Patient was treated symptomatically for her headache and on reevaluation reported improvement of her symptoms  All diagnostic studies were discussed with the patient in detail  She is appropriate for discharge at this time with recommendation to follow-up with her PCP  Return precautions were discussed  Abdominal pain: acute illness or injury  Migraine headache: acute illness or injury  Amount and/or Complexity of Data Reviewed  Labs: ordered  Risk  Prescription drug management  Disposition  Final diagnoses:   Migraine headache   Abdominal pain     Time reflects when diagnosis was documented in both MDM as applicable and the Disposition within this note     Time User Action Codes Description Comment    2/7/2023  9:17 PM Sri Loomis Add [G43 909] Migraine headache     2/8/2023 12:00 AM Sri Loomis Add [R10 9] Abdominal pain       ED Disposition     ED Disposition   Discharge    Condition   Stable    Date/Time   Tue Feb 7, 2023  9:17 PM    Comment   Dain Qureshi discharge to home/self care                 Follow-up Information Follow up With Specialties Details Why Contact Info Additional Information    Ning Morel MD Emergency Medicine Schedule an appointment as soon as possible for a visit   [address] [facility] [address] [address] [facility] Emergency Department Emergency Medicine Go to  If symptoms worsen [address] [address]  [address] [facility] Emergency Department, [address], [address]          Discharge Medication List as of 2/7/2023  9:22 PM      CONTINUE these medications which have NOT CHANGED    Details   citalopram (CeleXA) 20 mg tablet Take 1 tablet by mouth daily for 30 days, Starting Mon 8/7/2017, Until Sat 6/19/2021, Print      divalproex sodium (DEPAKOTE) 500 mg EC tablet Take 1 tablet by mouth 2 (two) times a day for 30 days, Starting Mon 8/7/2017, Until Sat 6/19/2021, Print      ondansetron (Zofran ODT) 4 mg disintegrating tablet Take 1 tablet (4 mg total) by mouth every 6 (six) hours as needed for nausea or vomiting, Starting Fri 10/28/2022, Normal      polyvinyl alcohol (LIQUIFILM TEARS) 1 4 % ophthalmic solution Administer 1 drop to both eyes as needed for dry eyes, Starting Fri 10/8/2021, Normal             No discharge procedures on file      PDMP Review     None          ED Provider  Electronically Signed by           Zuleika Caba MD  02/08/23 0002

## 2023-08-01 ENCOUNTER — VBI (OUTPATIENT)
Dept: ADMINISTRATIVE | Facility: OTHER | Age: 53
End: 2023-08-01

## 2023-08-18 ENCOUNTER — VBI (OUTPATIENT)
Dept: ADMINISTRATIVE | Facility: OTHER | Age: 53
End: 2023-08-18

## 2023-08-23 ENCOUNTER — VBI (OUTPATIENT)
Dept: ADMINISTRATIVE | Facility: OTHER | Age: 53
End: 2023-08-23

## 2024-03-19 ENCOUNTER — APPOINTMENT (EMERGENCY)
Dept: RADIOLOGY | Facility: HOSPITAL | Age: 54
End: 2024-03-19
Payer: COMMERCIAL

## 2024-03-19 ENCOUNTER — HOSPITAL ENCOUNTER (EMERGENCY)
Facility: HOSPITAL | Age: 54
Discharge: HOME | End: 2024-03-19
Attending: EMERGENCY MEDICINE
Payer: COMMERCIAL

## 2024-03-19 VITALS
SYSTOLIC BLOOD PRESSURE: 142 MMHG | DIASTOLIC BLOOD PRESSURE: 89 MMHG | HEART RATE: 96 BPM | OXYGEN SATURATION: 96 % | RESPIRATION RATE: 18 BRPM | TEMPERATURE: 98.2 F

## 2024-03-19 DIAGNOSIS — Z79.899 ON VALPROIC ACID THERAPY: ICD-10-CM

## 2024-03-19 DIAGNOSIS — Z87.898 HISTORY OF SEIZURE: ICD-10-CM

## 2024-03-19 DIAGNOSIS — G40.909 EPILEPSY (HCC): ICD-10-CM

## 2024-03-19 DIAGNOSIS — R07.9 CHEST PAIN, UNSPECIFIED: Primary | ICD-10-CM

## 2024-03-19 LAB
ALBUMIN SERPL BCP-MCNC: 3.6 G/DL (ref 3.5–5)
ALP SERPL-CCNC: 97 U/L (ref 34–104)
ALT SERPL W P-5'-P-CCNC: 32 U/L (ref 7–52)
ANION GAP SERPL CALCULATED.3IONS-SCNC: 7 MMOL/L (ref 4–13)
AST SERPL W P-5'-P-CCNC: 21 U/L (ref 13–39)
BASOPHILS # BLD AUTO: 0.03 THOUSANDS/ÂΜL (ref 0–0.1)
BASOPHILS NFR BLD AUTO: 1 % (ref 0–1)
BILIRUB SERPL-MCNC: 0.3 MG/DL (ref 0.2–1)
BILIRUB UR QL STRIP: NEGATIVE
BUN SERPL-MCNC: 16 MG/DL (ref 5–25)
CALCIUM SERPL-MCNC: 8.6 MG/DL (ref 8.4–10.2)
CARDIAC TROPONIN I PNL SERPL HS: 4 NG/L
CHLORIDE SERPL-SCNC: 103 MMOL/L (ref 96–108)
CLARITY UR: CLEAR
CO2 SERPL-SCNC: 29 MMOL/L (ref 21–32)
COLOR UR: YELLOW
CREAT SERPL-MCNC: 0.72 MG/DL (ref 0.6–1.3)
EOSINOPHIL # BLD AUTO: 0.13 THOUSAND/ÂΜL (ref 0–0.61)
EOSINOPHIL NFR BLD AUTO: 2 % (ref 0–6)
ERYTHROCYTE [DISTWIDTH] IN BLOOD BY AUTOMATED COUNT: 12.3 % (ref 11.6–15.1)
GFR SERPL CREATININE-BSD FRML MDRD: 96 ML/MIN/1.73SQ M
GLUCOSE SERPL-MCNC: 119 MG/DL (ref 65–140)
GLUCOSE UR STRIP-MCNC: NEGATIVE MG/DL
HCT VFR BLD AUTO: 38.6 % (ref 34.8–46.1)
HGB BLD-MCNC: 12.6 G/DL (ref 11.5–15.4)
HGB UR QL STRIP.AUTO: NEGATIVE
IMM GRANULOCYTES # BLD AUTO: 0.03 THOUSAND/UL (ref 0–0.2)
IMM GRANULOCYTES NFR BLD AUTO: 1 % (ref 0–2)
KETONES UR STRIP-MCNC: NEGATIVE MG/DL
LACTATE SERPL-SCNC: 1.1 MMOL/L (ref 0.5–2)
LEUKOCYTE ESTERASE UR QL STRIP: NEGATIVE
LYMPHOCYTES # BLD AUTO: 1.38 THOUSANDS/ÂΜL (ref 0.6–4.47)
LYMPHOCYTES NFR BLD AUTO: 21 % (ref 14–44)
MCH RBC QN AUTO: 31.3 PG (ref 26.8–34.3)
MCHC RBC AUTO-ENTMCNC: 32.6 G/DL (ref 31.4–37.4)
MCV RBC AUTO: 96 FL (ref 82–98)
MONOCYTES # BLD AUTO: 0.51 THOUSAND/ÂΜL (ref 0.17–1.22)
MONOCYTES NFR BLD AUTO: 8 % (ref 4–12)
NEUTROPHILS # BLD AUTO: 4.44 THOUSANDS/ÂΜL (ref 1.85–7.62)
NEUTS SEG NFR BLD AUTO: 67 % (ref 43–75)
NITRITE UR QL STRIP: NEGATIVE
NRBC BLD AUTO-RTO: 0 /100 WBCS
PH UR STRIP.AUTO: 6.5 [PH]
PLATELET # BLD AUTO: 162 THOUSANDS/UL (ref 149–390)
PMV BLD AUTO: 11.5 FL (ref 8.9–12.7)
POTASSIUM SERPL-SCNC: 3.9 MMOL/L (ref 3.5–5.3)
PROT SERPL-MCNC: 6.5 G/DL (ref 6.4–8.4)
PROT UR STRIP-MCNC: NEGATIVE MG/DL
RBC # BLD AUTO: 4.03 MILLION/UL (ref 3.81–5.12)
SODIUM SERPL-SCNC: 139 MMOL/L (ref 135–147)
SP GR UR STRIP.AUTO: 1.01 (ref 1–1.03)
UROBILINOGEN UR QL STRIP.AUTO: 0.2 E.U./DL
VALPROATE SERPL-MCNC: 12 UG/ML (ref 50–100)
WBC # BLD AUTO: 6.52 THOUSAND/UL (ref 4.31–10.16)

## 2024-03-19 PROCEDURE — 80164 ASSAY DIPROPYLACETIC ACD TOT: CPT | Performed by: EMERGENCY MEDICINE

## 2024-03-19 PROCEDURE — 36415 COLL VENOUS BLD VENIPUNCTURE: CPT | Performed by: EMERGENCY MEDICINE

## 2024-03-19 PROCEDURE — 93005 ELECTROCARDIOGRAM TRACING: CPT

## 2024-03-19 PROCEDURE — 83605 ASSAY OF LACTIC ACID: CPT | Performed by: EMERGENCY MEDICINE

## 2024-03-19 PROCEDURE — 81003 URINALYSIS AUTO W/O SCOPE: CPT | Performed by: EMERGENCY MEDICINE

## 2024-03-19 PROCEDURE — 85025 COMPLETE CBC W/AUTO DIFF WBC: CPT | Performed by: EMERGENCY MEDICINE

## 2024-03-19 PROCEDURE — 71045 X-RAY EXAM CHEST 1 VIEW: CPT

## 2024-03-19 PROCEDURE — 84484 ASSAY OF TROPONIN QUANT: CPT | Performed by: EMERGENCY MEDICINE

## 2024-03-19 PROCEDURE — 99285 EMERGENCY DEPT VISIT HI MDM: CPT | Performed by: EMERGENCY MEDICINE

## 2024-03-19 PROCEDURE — 99285 EMERGENCY DEPT VISIT HI MDM: CPT

## 2024-03-19 PROCEDURE — 80053 COMPREHEN METABOLIC PANEL: CPT | Performed by: EMERGENCY MEDICINE

## 2024-03-19 RX ORDER — VALPROIC ACID 250 MG/1
500 CAPSULE, LIQUID FILLED ORAL ONCE
Status: DISCONTINUED | OUTPATIENT
Start: 2024-03-19 | End: 2024-03-19 | Stop reason: HOSPADM

## 2024-03-19 RX ORDER — DIVALPROEX SODIUM 500 MG/1
500 TABLET, DELAYED RELEASE ORAL 2 TIMES DAILY
Qty: 60 TABLET | Refills: 0 | Status: SHIPPED | OUTPATIENT
Start: 2024-03-19 | End: 2024-04-18

## 2024-03-19 NOTE — ED CARE HANDOFF
"Emergency Department Sign Out Note        Sign out and transfer of care from Dr. Cortes. See Separate Emergency Department note.     The patient, Maddie Arteaga, was evaluated by the previous provider for chest pain    Workup Completed:  Labs Reviewed   VALPROIC ACID LEVEL, TOTAL - Abnormal       Result Value Ref Range Status    Valproic Acid, Total 12 (*) 50 - 100 ug/mL Final   LACTIC ACID, PLASMA (W/REFLEX IF RESULT > 2.0) - Normal    LACTIC ACID 1.1  0.5 - 2.0 mmol/L Final    Narrative:     Result may be elevated if tourniquet was used during collection.   HS TROPONIN I 0HR - Normal    hs TnI 0hr 4  \"Refer to ACS Flowchart\"- see link ng/L Final    Comment:                                              Initial (time 0) result  If >=50 ng/L, Myocardial injury suggested ;  Type of myocardial injury and treatment strategy  to be determined.  If 5-49 ng/L, a delta result at 2 hours and or 4 hours will be needed to further evaluate.  If <4 ng/L, and chest pain has been >3 hours since onset, patient may qualify for discharge based on the HEART score in the ED.  If <5 ng/L and <3hours since onset of chest pain, a delta result at 2 hours will be needed to further evaluate.    HS Troponin 99th Percentile URL of a Health Population=12 ng/L with a 95% Confidence Interval of 8-18 ng/L.    Second Troponin (time 2 hours)  If calculated delta >= 20 ng/L,  Myocardial injury suggested ; Type of myocardial injury and treatment strategy to be determined.  If 5-49 ng/L and the calculated delta is 5-19 ng/L, consult medical service for evaluation.  Continue evaluation for ischemia on ecg and other possible etiology and repeat hs troponin at 4 hours.  If delta is <5 ng/L at 2 hours, consider discharge based on risk stratification via the HEART score (if in ED), or ANGELINE risk score in IP/Observation.    HS Troponin 99th Percentile URL of a Health Population=12 ng/L with a 95% Confidence Interval of 8-18 ng/L.   UA W REFLEX TO MICROSCOPIC WITH " REFLEX TO CULTURE - Normal    Color, UA Yellow  Yellow Final    Clarity, UA Clear  Clear Final    Specific Gravity, UA 1.010  1.001 - 1.030 Final    pH, UA 6.5  5.0, 5.5, 6.0, 6.5, 7.0, 7.5, 8.0 Final    Leukocytes, UA Negative  Negative Final    Nitrite, UA Negative  Negative Final    Protein, UA Negative  Negative, Interference- unable to analyze mg/dl Final    Glucose, UA Negative  Negative mg/dl Final    Ketones, UA Negative  Negative mg/dl Final    Urobilinogen, UA 0.2  0.2, 1.0 E.U./dl E.U./dl Final    Bilirubin, UA Negative  Negative Final    Occult Blood, UA Negative  Negative Final   CBC AND DIFFERENTIAL    WBC 6.52  4.31 - 10.16 Thousand/uL Final    RBC 4.03  3.81 - 5.12 Million/uL Final    Hemoglobin 12.6  11.5 - 15.4 g/dL Final    Hematocrit 38.6  34.8 - 46.1 % Final    MCV 96  82 - 98 fL Final    MCH 31.3  26.8 - 34.3 pg Final    MCHC 32.6  31.4 - 37.4 g/dL Final    RDW 12.3  11.6 - 15.1 % Final    MPV 11.5  8.9 - 12.7 fL Final    Platelets 162  149 - 390 Thousands/uL Final    nRBC 0  /100 WBCs Final    Neutrophils Relative 67  43 - 75 % Final    Immature Grans % 1  0 - 2 % Final    Lymphocytes Relative 21  14 - 44 % Final    Monocytes Relative 8  4 - 12 % Final    Eosinophils Relative 2  0 - 6 % Final    Basophils Relative 1  0 - 1 % Final    Neutrophils Absolute 4.44  1.85 - 7.62 Thousands/µL Final    Absolute Immature Grans 0.03  0.00 - 0.20 Thousand/uL Final    Absolute Lymphocytes 1.38  0.60 - 4.47 Thousands/µL Final    Absolute Monocytes 0.51  0.17 - 1.22 Thousand/µL Final    Eosinophils Absolute 0.13  0.00 - 0.61 Thousand/µL Final    Basophils Absolute 0.03  0.00 - 0.10 Thousands/µL Final   COMPREHENSIVE METABOLIC PANEL    Sodium 139  135 - 147 mmol/L Final    Potassium 3.9  3.5 - 5.3 mmol/L Final    Chloride 103  96 - 108 mmol/L Final    CO2 29  21 - 32 mmol/L Final    ANION GAP 7  4 - 13 mmol/L Final    BUN 16  5 - 25 mg/dL Final    Creatinine 0.72  0.60 - 1.30 mg/dL Final    Comment:  Standardized to IDMS reference method    Glucose 119  65 - 140 mg/dL Final    Comment: If the patient is fasting, the ADA then defines impaired fasting glucose as > 100 mg/dL and diabetes as > or equal to 123 mg/dL.    Calcium 8.6  8.4 - 10.2 mg/dL Final    AST 21  13 - 39 U/L Final    ALT 32  7 - 52 U/L Final    Comment: Specimen collection should occur prior to Sulfasalazine administration due to the potential for falsely depressed results.     Alkaline Phosphatase 97  34 - 104 U/L Final    Total Protein 6.5  6.4 - 8.4 g/dL Final    Albumin 3.6  3.5 - 5.0 g/dL Final    Total Bilirubin 0.30  0.20 - 1.00 mg/dL Final    Comment: Use of this assay is not recommended for patients undergoing treatment with eltrombopag due to the potential for falsely elevated results.  N-acetyl-p-benzoquinone imine (metabolite of Acetaminophen) will generate erroneously low results in samples for patients that have taken an overdose of Acetaminophen.    eGFR 96  ml/min/1.73sq m Final    Narrative:     National Kidney Disease Foundation guidelines for Chronic Kidney Disease (CKD):     Stage 1 with normal or high GFR (GFR > 90 mL/min/1.73 square meters)    Stage 2 Mild CKD (GFR = 60-89 mL/min/1.73 square meters)    Stage 3A Moderate CKD (GFR = 45-59 mL/min/1.73 square meters)    Stage 3B Moderate CKD (GFR = 30-44 mL/min/1.73 square meters)    Stage 4 Severe CKD (GFR = 15-29 mL/min/1.73 square meters)    Stage 5 End Stage CKD (GFR <15 mL/min/1.73 square meters)  Note: GFR calculation is accurate only with a steady state creatinine   HS TROPONIN I 2HR   HS TROPONIN I 4HR         ED Course / Workup Pending (followup):  This is 53y old morbidly obese, came from home as she has chest pain . Pt also was shaking and she has h/o of seizure. Pt shaking make her son call 911. Pt is on Depakote ans she stated she is compliant with her meds,   Physical exam; no pertinent positive findings.   EKG; NSR RATE 100, no acute ischemic changes.   Labs  reviewed and shows low valproic acid level .  pt received depakote 500mg at er.  Pt discharged by dr Cortes       HEART Risk Score      Flowsheet Row Most Recent Value   Heart Score Risk Calculator    History 0 Filed at: 03/19/2024 0840   ECG 0 Filed at: 03/19/2024 0840   Age 1 Filed at: 03/19/2024 0840   Risk Factors 1 Filed at: 03/19/2024 0840   Troponin 0 Filed at: 03/19/2024 0840   HEART Score 2 Filed at: 03/19/2024 0840                                       Procedures  Medical Decision Making  Amount and/or Complexity of Data Reviewed  Labs: ordered.  Radiology: ordered and independent interpretation performed.    Risk  Prescription drug management.            Disposition  Final diagnoses:   History of seizure   Chest pain, unspecified   On valproic acid therapy     Time reflects when diagnosis was documented in both MDM as applicable and the Disposition within this note       Time User Action Codes Description Comment    3/19/2024  8:37 AM Amado Cortes Add [R07.9] Chest pain     3/19/2024  8:37 AM Amado Cortes Add [Z87.898] History of seizure     3/19/2024  8:37 AM Amado Cortes Modify [Z87.898] History of seizure     3/19/2024  8:37 AM Amado Cortes Remove [R07.9] Chest pain     3/19/2024  8:37 AM Amado Cortes Add [R07.9] Chest pain, unspecified     3/19/2024  8:38 AM Amado Cortes Modify [Z87.898] History of seizure     3/19/2024  8:38 AM Amado Cortes Modify [R07.9] Chest pain, unspecified     3/19/2024  8:38 AM Amado Cortes Add [Z79.899] On valproic acid therapy           ED Disposition       ED Disposition   Discharge    Condition   Stable    Date/Time   Tue Mar 19, 2024 0840    Comment   Maddie Arteaga discharge to home/self care.                   Follow-up Information       Follow up With Specialties Details Why Contact Info Additional Information    Houston Lagunas MD Emergency Medicine Call in 1 week For follow-up [address] [facility] [address] [phone]       [facility] [place] [facility] Call in 1 day For follow-up [address] [facility] [address]  [address]  [facility] [id number] [phone] [facility] [place], [address], [place], [place], [address] [phone]    [facility] Emergency Department Emergency Medicine Go to  If symptoms worsen [address] [address]  [phone] [facility] Emergency Department, [address] [place] [id number]          Patient's Medications   Discharge Prescriptions    No medications on file            ED Provider  Electronically Signed by     Jasmyn Newsome MD  03/20/24 1233

## 2024-03-19 NOTE — ED NOTES
Son, Reddy, at bedside. IV remoted. DC instructions reviewed. Son requesting new prescription be sent to pharmacy for Depakote. ED MD made aware. Pt ambulated to  with steady gait.      Carlota Ortiz RN  03/19/24 1511
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
## 2024-03-19 NOTE — ED PROVIDER NOTES
"History  Chief Complaint   Patient presents with    Chills     Patient arrives to ER via EMS.  C/O is shaking. No seizure activity witnessed     Chest Pain     54 y/o female with hx of obesity, depression, and seizure disorder (on Depakote) presents to the ED via EMS from home for evaluation of reported chest pain as well as possible seizure.  Per EMS report, the patient's family called for EMS evaluation when she awoke this morning with substernal chest pain radiating to her right shoulder.  She received aspirin and sublingual nitroglycerin from EMS and EMS noted improvement in her chest pain.  The patient denies any current chest pain and states that she does not remember anything after getting up to go to the bathroom this morning.  Per EMS, no reported seizure activity by family, however the patient does have a history of epilepsy and is on Depakote.  When asked, the patient states that she only takes her Depakote intermittently and she has not seen her neurologist in several years (\"not since COVID\").  She is currently chest pain-free and denies any other symptoms or complaints.        Prior to Admission Medications   Prescriptions Last Dose Informant Patient Reported? Taking?   citalopram (CeleXA) 20 mg tablet   No No   Sig: Take 1 tablet by mouth daily for 30 days   divalproex sodium (DEPAKOTE) 500 mg EC tablet   No No   Sig: Take 1 tablet by mouth 2 (two) times a day for 30 days   ondansetron (Zofran ODT) 4 mg disintegrating tablet   No No   Sig: Take 1 tablet (4 mg total) by mouth every 6 (six) hours as needed for nausea or vomiting   polyvinyl alcohol (LIQUIFILM TEARS) 1.4 % ophthalmic solution   No No   Sig: Administer 1 drop to both eyes as needed for dry eyes   Patient not taking: Reported on 10/28/2022      Facility-Administered Medications: None       Past Medical History:   Diagnosis Date    Depression     DVT (deep venous thrombosis) (HCC)     Migraine     Psychiatric disorder     Seizures (HCC)     " Suicide attempt (HCC)        Past Surgical History:   Procedure Laterality Date     SECTION      CHOLECYSTECTOMY         Family History   Family history unknown: Yes     I have reviewed and agree with the history as documented.    E-Cigarette/Vaping    E-Cigarette Use Never User      E-Cigarette/Vaping Substances     Social History     Tobacco Use    Smoking status: Never    Smokeless tobacco: Never   Vaping Use    Vaping status: Never Used   Substance Use Topics    Alcohol use: Yes    Drug use: No       Review of Systems   Unable to perform ROS: Other (The patient is amnesic to the events that brought her to the ER today and is overall a poor historian)       Physical Exam  Physical Exam  Vitals and nursing note reviewed.   Constitutional:       General: She is not in acute distress.     Appearance: Normal appearance. She is well-developed. She is obese. She is not ill-appearing, toxic-appearing or diaphoretic.   HENT:      Head: Normocephalic and atraumatic.      Right Ear: External ear normal.      Left Ear: External ear normal.      Nose: Nose normal. No congestion or rhinorrhea.      Mouth/Throat:      Mouth: Mucous membranes are moist.      Pharynx: Oropharynx is clear. No oropharyngeal exudate or posterior oropharyngeal erythema.   Eyes:      Extraocular Movements: Extraocular movements intact.      Conjunctiva/sclera: Conjunctivae normal.      Pupils: Pupils are equal, round, and reactive to light.   Cardiovascular:      Rate and Rhythm: Normal rate and regular rhythm.      Pulses: Normal pulses.      Heart sounds: Normal heart sounds. No murmur heard.  Pulmonary:      Effort: Pulmonary effort is normal. No respiratory distress.      Breath sounds: Normal breath sounds. No wheezing or rales.   Abdominal:      General: Abdomen is flat. Bowel sounds are normal. There is no distension.      Palpations: Abdomen is soft.      Tenderness: There is no abdominal tenderness. There is no right CVA tenderness,  left CVA tenderness or guarding.   Musculoskeletal:         General: No swelling or tenderness. Normal range of motion.      Cervical back: Normal range of motion and neck supple. No tenderness.   Skin:     General: Skin is warm and dry.      Capillary Refill: Capillary refill takes less than 2 seconds.   Neurological:      General: No focal deficit present.      Mental Status: She is alert and oriented to person, place, and time.      Cranial Nerves: No cranial nerve deficit.      Motor: No weakness.         Vital Signs  ED Triage Vitals   Temperature Pulse Respirations Blood Pressure SpO2   03/19/24 0633 03/19/24 0632 03/19/24 0632 03/19/24 0632 03/19/24 0632   98.2 °F (36.8 °C) 96 18 142/89 96 %      Temp Source Heart Rate Source Patient Position - Orthostatic VS BP Location FiO2 (%)   03/19/24 0633 03/19/24 0632 03/19/24 0632 03/19/24 0632 --   Oral Monitor Lying Left arm       Pain Score       --                  Vitals:    03/19/24 0632   BP: 142/89   Pulse: 96   Patient Position - Orthostatic VS: Lying         Visual Acuity      ED Medications  Medications   valproic acid (DEPAKENE) capsule 500 mg (has no administration in time range)       Diagnostic Studies  Results Reviewed       Procedure Component Value Units Date/Time    HS Troponin 0hr (reflex protocol) [[id number]]  (Normal) Collected: 03/19/24 0646    Lab Status: Final result Specimen: Blood from Arm, Left Updated: 03/19/24 0721     hs TnI 0hr 4 ng/L     Lactic acid, plasma (w/reflex if result > 2.0) [[id number]]  (Normal) Collected: 03/19/24 0646    Lab Status: Final result Specimen: Blood from Arm, Left Updated: 03/19/24 0718     LACTIC ACID 1.1 mmol/L     Narrative:      Result may be elevated if tourniquet was used during collection.    Valproic acid level, total [[id number]]  (Abnormal) Collected: 03/19/24 0646    Lab Status: Final result Specimen: Blood from Arm, Left Updated: 03/19/24 0718     Valproic Acid, Total 12 ug/mL     Comprehensive  metabolic panel [[id number]] Collected: 03/19/24 0646    Lab Status: Final result Specimen: Blood from Arm, Left Updated: 03/19/24 0718     Sodium 139 mmol/L      Potassium 3.9 mmol/L      Chloride 103 mmol/L      CO2 29 mmol/L      ANION GAP 7 mmol/L      BUN 16 mg/dL      Creatinine 0.72 mg/dL      Glucose 119 mg/dL      Calcium 8.6 mg/dL      AST 21 U/L      ALT 32 U/L      Alkaline Phosphatase 97 U/L      Total Protein 6.5 g/dL      Albumin 3.6 g/dL      Total Bilirubin 0.30 mg/dL      eGFR 96 ml/min/1.73sq m     Narrative:      National Kid[employer] guidelines for Chronic Kidney Disease (CKD):     Stage 1 with normal or high GFR (GFR > 90 mL/min/1.73 square meters)    Stage 2 Mild CKD (GFR = 60-89 mL/min/1.73 square meters)    Stage 3A Moderate CKD (GFR = 45-59 mL/min/1.73 square meters)    Stage 3B Moderate CKD (GFR = 30-44 mL/min/1.73 square meters)    Stage 4 Severe CKD (GFR = 15-29 mL/min/1.73 square meters)    Stage 5 End Stage CKD (GFR <15 mL/min/1.73 square meters)  Note: GFR calculation is accurate only with a steady state creatinine    UA w Reflex to Microscopic w Reflex to Culture [[id number]]  (Normal) Collected: 03/19/24 0645    Lab Status: Final result Specimen: Urine, Other Updated: 03/19/24 0703     Color, UA Yellow     Clarity, UA Clear     Specific Gravity, UA 1.010     pH, UA 6.5     Leukocytes, UA Negative     Nitrite, UA Negative     Protein, UA Negative mg/dl      Glucose, UA Negative mg/dl      Ketones, UA Negative mg/dl      Urobilinogen, UA 0.2 E.U./dl      Bilirubin, UA Negative     Occult Blood, UA Negative    CBC and differential [[id number]] Collected: 03/19/24 0646    Lab Status: Final result Specimen: Blood from Arm, Left Updated: 03/19/24 0657     WBC 6.52 Thousand/uL      RBC 4.03 Million/uL      Hemoglobin 12.6 g/dL      Hematocrit 38.6 %      MCV 96 fL      MCH 31.3 pg      MCHC 32.6 g/dL      RDW 12.3 %      MPV 11.5 fL      Platelets 162 Thousands/uL      nRBC 0  /100 WBCs      Neutrophils Relative 67 %      Immature Grans % 1 %      Lymphocytes Relative 21 %      Monocytes Relative 8 %      Eosinophils Relative 2 %      Basophils Relative 1 %      Neutrophils Absolute 4.44 Thousands/µL      Absolute Immature Grans 0.03 Thousand/uL      Absolute Lymphocytes 1.38 Thousands/µL      Absolute Monocytes 0.51 Thousand/µL      Eosinophils Absolute 0.13 Thousand/µL      Basophils Absolute 0.03 Thousands/µL                    XR chest 1 view portable   ED Interpretation by Amado Cortes MD (03/19 0841)   Limited study secondary to body habitus and poor penetration. No acute cardiopulmonary disease process on my interpretation.                 Procedures  Procedures         ED Course  ED Course as of 03/19/24 0844   Tue Mar 19, 2024   0640 Procedure Note: EKG  Date/Time: 03/19/24 6:36 AM   Interpreted by: Amado Cortes MD  Indications / Diagnosis: CP  ECG reviewed by me, the ED Physician: yes   The EKG demonstrates:  Rhythm: normal sinus rhythm 100 bpm  Intervals: normal intervals  Axis: normal axis  QRS/Blocks: normal QRS  ST Changes: No acute ST-T wave changes.  No STEMI.  No significant change compared to prior ECG from 6/19/2021.   0703 WBC: 6.52   0703 Hemoglobin: 12.6   0703 Hematocrit: 38.6   0703 Platelet Count: 162   0706 UA w Reflex to Microscopic w Reflex to Culture  All within normal limits   0820 XR chest 1 view portable  Limited study secondary to body habitus and poor penetration. No acute cardiopulmonary disease process on my interpretation.   0820 VALPROIC ACID TOTAL(!): 12  Patient reports only intermittent compliance with her Depakote.  Will order Depakote and I encouraged close compliance and outpatient follow-up with neurology.   0820 LACTIC ACID: 1.1   0832 Patient states that she is feeling well and wants to go home and she is requesting we call her son to have him pick her up.  She is declining any further workup and declining delta troponin.   0842 hs TnI  "0hr: 4  0820             HEART Risk Score      Flowsheet Row Most Recent Value   Heart Score Risk Calculator    History 0 Filed at: 03/19/2024 0840   ECG 0 Filed at: 03/19/2024 0840   Age 1 Filed at: 03/19/2024 0840   Risk Factors 1 Filed at: 03/19/2024 0840   Troponin 0 Filed at: 03/19/2024 0840   HEART Score 2 Filed at: 03/19/2024 0840                                        Medical Decision Making  54 y/o female with hx of obesity, depression, and seizure disorder (on Depakote) presents to the ED via EMS from home for evaluation of reported chest pain as well as possible seizure.  Per EMS report, the patient's family called for EMS evaluation when she awoke this morning with substernal chest pain radiating to her right shoulder.  She received aspirin and sublingual nitroglycerin from EMS and EMS noted improvement in her chest pain.  The patient denies any current chest pain and states that she does not remember anything after getting up to go to the bathroom this morning.  Per EMS, no reported seizure activity by family, however the patient does have a history of epilepsy and is on Depakote.  When asked, the patient states that she only takes her Depakote intermittently and she has not seen her neurologist in several years (\"not since COVID\").  She is currently chest pain-free and denies any other symptoms or complaints.    Vital signs reviewed.  See physical exam documentation for exam findings.  Differential diagnosis includes but is not limited to    Amount and/or Complexity of Data Reviewed  Labs: ordered. Decision-making details documented in ED Course.  Radiology: ordered.    Risk  Prescription drug management.             Disposition  Final diagnoses:   History of seizure   Chest pain, unspecified   On valproic acid therapy     Time reflects when diagnosis was documented in both MDM as applicable and the Disposition within this note       Time User Action Codes Description Comment    3/19/2024  8:37 AM " Amado Cortes Add [R07.9] Chest pain     3/19/2024  8:37 AM Amado Cortes Add [Z87.898] History of seizure     3/19/2024  8:37 AM Amado Cortes Modify [Z87.898] History of seizure     3/19/2024  8:37 AM Amado Cortes Remove [R07.9] Chest pain     3/19/2024  8:37 AM Amado Cortse Add [R07.9] Chest pain, unspecified     3/19/2024  8:38 AM Amado Cortes Modify [Z87.898] History of seizure     3/19/2024  8:38 AM Amado Cortes Modify [R07.9] Chest pain, unspecified     3/19/2024  8:38 AM Amado Cortes [Z79.899] On valproic acid therapy           ED Disposition       ED Disposition   Discharge    Condition   Stable    Date/Time   Tue Mar 19, 2024 0840    Comment   aMddie Arteaga discharge to home/self care.                   Follow-up Information       Follow up With Specialties Details Why Contact Info Additional Information    Houston Lagunas MD Emergency Medicine Call in 1 week For follow-up [address] [facility] [address]  [phone]       [facility] Neurology [facility] Neurology Call in 1 day For follow-up [facility] [address] [address] [phone] [facility], [address] [place] [address], [place] [address], [place], [place], [id number] [phone]    [facility] Emergency Department Emergency Medicine Go to  If symptoms worsen [address] [address]  [phone] [facility] Emergency Department, [address] [address]            Patient's Medications   Discharge Prescriptions    No medications on file           PDMP Review       None            ED Provider  Electronically Signed by           [id number]  [phone] [facility] [place], [address] [place] [address], [address], [place], [place], [address]   [phone]    [facility] Emergency Department Emergency Medicine Go to  If symptoms worsen [address] [place] [address]  [phone] [facility] Emergency Department, [address] [place] [phone]            Patient's Medications   Discharge Prescriptions    No medications on file           PDMP Review       None            ED Provider  Electronically Signed by             Amado Cortes MD  04/03/24 0667

## 2024-03-21 LAB
ATRIAL RATE: 100 BPM
P AXIS: 40 DEGREES
PR INTERVAL: 158 MS
QRS AXIS: 12 DEGREES
QRSD INTERVAL: 74 MS
QT INTERVAL: 352 MS
QTC INTERVAL: 454 MS
T WAVE AXIS: 33 DEGREES
VENTRICULAR RATE: 100 BPM

## 2024-03-21 PROCEDURE — 93010 ELECTROCARDIOGRAM REPORT: CPT | Performed by: INTERNAL MEDICINE

## 2024-04-08 ENCOUNTER — OFFICE VISIT (OUTPATIENT)
Dept: FAMILY MEDICINE CLINIC | Facility: CLINIC | Age: 54
End: 2024-04-08
Payer: COMMERCIAL

## 2024-04-08 VITALS
OXYGEN SATURATION: 95 % | SYSTOLIC BLOOD PRESSURE: 170 MMHG | WEIGHT: 293 LBS | HEART RATE: 82 BPM | DIASTOLIC BLOOD PRESSURE: 108 MMHG | TEMPERATURE: 98.5 F | BODY MASS INDEX: 60.39 KG/M2

## 2024-04-08 DIAGNOSIS — G40.909 NONINTRACTABLE EPILEPSY WITHOUT STATUS EPILEPTICUS, UNSPECIFIED EPILEPSY TYPE (HCC): ICD-10-CM

## 2024-04-08 DIAGNOSIS — F33.2 SEVERE EPISODE OF RECURRENT MAJOR DEPRESSIVE DISORDER, WITHOUT PSYCHOTIC FEATURES (HCC): Primary | Chronic | ICD-10-CM

## 2024-04-08 DIAGNOSIS — R35.89 POLYURIA: ICD-10-CM

## 2024-04-08 DIAGNOSIS — I10 PRIMARY HYPERTENSION: ICD-10-CM

## 2024-04-08 DIAGNOSIS — Z13.220 SCREENING, LIPID: ICD-10-CM

## 2024-04-08 PROCEDURE — 99213 OFFICE O/P EST LOW 20 MIN: CPT

## 2024-04-08 RX ORDER — TOPIRAMATE SPINKLE 25 MG/1
25 CAPSULE ORAL 2 TIMES DAILY
Qty: 60 CAPSULE | Refills: 0 | Status: SHIPPED | OUTPATIENT
Start: 2024-04-08 | End: 2024-04-08

## 2024-04-08 RX ORDER — LOSARTAN POTASSIUM 50 MG/1
50 TABLET ORAL DAILY
Qty: 30 TABLET | Refills: 0 | Status: CANCELLED | OUTPATIENT
Start: 2024-04-08

## 2024-04-08 RX ORDER — NEBULIZER AND COMPRESSOR
EACH MISCELLANEOUS 2 TIMES DAILY
Qty: 1 KIT | Refills: 0 | Status: SHIPPED | OUTPATIENT
Start: 2024-04-08

## 2024-04-08 RX ORDER — LAMOTRIGINE 25 MG/1
25 TABLET ORAL DAILY
Qty: 30 TABLET | Refills: 1 | Status: SHIPPED | OUTPATIENT
Start: 2024-04-08

## 2024-04-08 RX ORDER — LAMOTRIGINE 25 MG/1
25 TABLET ORAL DAILY
Qty: 30 TABLET | Refills: 1 | Status: SHIPPED | OUTPATIENT
Start: 2024-04-08 | End: 2024-04-08

## 2024-04-08 RX ORDER — CITALOPRAM 20 MG/1
20 TABLET ORAL DAILY
Qty: 30 TABLET | Refills: 0 | Status: SHIPPED | OUTPATIENT
Start: 2024-04-08 | End: 2024-05-08

## 2024-04-08 RX ORDER — TOPIRAMATE SPINKLE 25 MG/1
25 CAPSULE ORAL 2 TIMES DAILY
Qty: 60 CAPSULE | Refills: 0 | Status: SHIPPED | OUTPATIENT
Start: 2024-04-08

## 2024-04-08 RX ORDER — CITALOPRAM 20 MG/1
20 TABLET ORAL DAILY
Qty: 30 TABLET | Refills: 0 | Status: SHIPPED | OUTPATIENT
Start: 2024-04-08 | End: 2024-04-08

## 2024-04-08 RX ORDER — NEBULIZER AND COMPRESSOR
EACH MISCELLANEOUS 2 TIMES DAILY
Qty: 1 KIT | Refills: 0 | Status: SHIPPED | OUTPATIENT
Start: 2024-04-08 | End: 2024-04-08

## 2024-04-08 RX ORDER — LAMOTRIGINE 25 MG/1
25 TABLET ORAL DAILY
COMMUNITY
End: 2024-04-08 | Stop reason: SDUPTHER

## 2024-04-08 RX ORDER — TOPIRAMATE SPINKLE 25 MG/1
25 CAPSULE ORAL 2 TIMES DAILY
COMMUNITY
End: 2024-04-08 | Stop reason: SDUPTHER

## 2024-04-08 NOTE — ASSESSMENT & PLAN NOTE
Pt does have a prior diagnosis of Bipolar (unspecified 1 vs 2) and had a screening PHQ9 of 9 and does reports she at times has thoughts she would be better off dead. She notes she has had thoughts of harming and even killing herself but has not acted on these and does not believe she would, does not have a plan if she were to. This time of year, as it is around the time of year she lost her mother 3y ago, is particularly difficult for pt, and she becomes tearful during out interview. Pt does endorse episodes of colt as recently as last week, where she spent days awake unintentionally and not feeling particularly like she needed sleep, in absence of drug use. She would like to establish with a psychiatrist again.  - I resumed pt's prior medications at this visit which includes lamictal 25 QD, topamax 25 BID, and celexa 20 QD. I pressed the importance of this pt establishing with a psychiatrist especially in the context of an antidepressant in a patient with a history of manic symptoms. I have reached out to a resident psychiatrist for advice/guidance in this case.  - Pt is taking Depakote 500 BID already which was a prior medication restarted on a recent ED visit and should have some mood stabilizing effects.  - Check liver and blood counts as a baseline

## 2024-04-08 NOTE — ASSESSMENT & PLAN NOTE
"Pt recently restarted depakote 500mg BID after what is reported as and on chart review is consistent with a breakthrough seizure. She notes she has had a diagnosis of epilepsy since she was 4yo. Pt is in the process of establishing with a neurologist. She has medications in her chart in addition to depakote which act as antiepileptics as well as mood stabilizers which have not been taken since \"before Covid\".  - Resume prior medications including lamictal 25 QD and topiramate 25 BID. Pressed importance of establishing with neurology.  "

## 2024-04-08 NOTE — ASSESSMENT & PLAN NOTE
Morbidly obese pt with Fhx diabetes who lost her mother to a diabetic coma 3y ago presenting with complaints of polyuria and polydipsia in absence of dysuria. Denies vision concerns. Endorses fatigue.  - Check fasting blood sugar and kidney function with CMP, pt to be contacted with results and plan for follow up

## 2024-04-08 NOTE — ASSESSMENT & PLAN NOTE
Pt's BP is 170/105 in office including on repeat. On ED evaluation in March pt's pressure was 142/89. Pt was unable to relax her arm during my recheck and she was tearful during the interview beforehand, so I suspect the numbers in clinic today do not represent her typical pressures, so I will not start her on a BP medication at this time.  - Sent home BP cuff and advised checking BID at same times each day and recording values.  - Return in 1 week for recheck.  - Pt with dyspnea on exertion and a hx of lasix use will likely need a more thorough cardiac workup in the near future.

## 2024-04-08 NOTE — PROGRESS NOTES
Name: Maddie Arteaga      : 1970      MRN: [id number]  Encounter Provider: Houston Hogue MD  Encounter Date: 2024   Encounter department: [facility]    Assessment & Plan     1. Severe episode of recurrent major depressive disorder, without psychotic features (HCC)  Assessment & Plan:  Pt does have a prior diagnosis of Bipolar (unspecified 1 vs 2) and had a screening PHQ9 of 9 and does reports she at times has thoughts she would be better off dead. She notes she has had thoughts of harming and even killing herself but has not acted on these and does not believe she would, does not have a plan if she were to. This time of year, as it is around the time of year she lost her mother 3y ago, is particularly difficult for pt, and she becomes tearful during out interview. Pt does endorse episodes of colt as recently as last week, where she spent days awake unintentionally and not feeling particularly like she needed sleep, in absence of drug use. She would like to establish with a psychiatrist again.  - I resumed pt's prior medications at this visit which includes lamictal 25 QD, topamax 25 BID, and celexa 20 QD. I pressed the importance of this pt establishing with a psychiatrist especially in the context of an antidepressant in a patient with a history of manic symptoms. I have reached out to a resident psychiatrist for advice/guidance in this case.  - Pt is taking Depakote 500 BID already which was a prior medication restarted on a recent ED visit and should have some mood stabilizing effects.  - Check liver and blood counts as a baseline      Orders:  -     Ambulatory referral to Psych Services; Future  -     TSH, 3rd generation with Free T4 reflex; Future  -     CBC and differential; Future  -     citalopram (CeleXA) 20 mg tablet; Take 1 tablet (20 mg total) by mouth daily  -     topiramate (TOPAMAX) 25 mg sprinkle capsule; Take 1 capsule (25 mg total) by mouth 2 (two) times a  "day    2. Primary hypertension  Assessment & Plan:  Pt's BP is 170/105 in office including on repeat. On ED evaluation in March pt's pressure was 142/89. Pt was unable to relax her arm during my recheck and she was tearful during the interview beforehand, so I suspect the numbers in clinic today do not represent her typical pressures, so I will not start her on a BP medication at this time.  - Sent home BP cuff and advised checking BID at same times each day and recording values.  - Return in 1 week for recheck.  - Pt with dyspnea on exertion and a hx of lasix use will likely need a more thorough cardiac workup in the near future.    Orders:  -     Blood Pressure Monitoring (Adult Blood Pressure Cuff Lg) KIT; Use 2 (two) times a day    3. Polyuria  Assessment & Plan:  Morbidly obese pt with Fhx diabetes who lost her mother to a diabetic coma 3y ago presenting with complaints of polyuria and polydipsia in absence of dysuria. Denies vision concerns. Endorses fatigue.  - Check fasting blood sugar and kidney function with CMP, pt to be contacted with results and plan for follow up    Orders:  -     Comprehensive metabolic panel; Future    4. Nonintractable epilepsy without status epilepticus, unspecified epilepsy type (HCC)  Assessment & Plan:  Pt recently restarted depakote 500mg BID after what is reported as and on chart review is consistent with a breakthrough seizure. She notes she has had a diagnosis of epilepsy since she was 2yo. Pt is in the process of establishing with a neurologist. She has medications in her chart in addition to depakote which act as antiepileptics as well as mood stabilizers which have not been taken since \"before Covid\".  - Resume prior medications including lamictal 25 QD and topiramate 25 BID. Pressed importance of establishing with neurology.    Orders:  -     CBC and differential; Future  -     lamoTRIgine (LaMICtal) 25 mg tablet; Take 1 tablet (25 mg total) by mouth daily  -     " "topiramate (TOPAMAX) 25 mg sprinkle capsule; Take 1 capsule (25 mg total) by mouth 2 (two) times a day    5. Screening, lipid  -     Lipid Panel with Direct LDL reflex; Future             Physical Activity Assessment and Intervention:    Physical Activity Prescription completed with patient      Emotional and Mental Well-being, Sleep, Connectedness Assessment and Intervention:    Sleep/stress assessment performed    PHQ-9 or GAD7 performed for initial evaluation or follow-up      Tobacco and Toxic Substance Assessment and Intervention:     Tobacco use screening performed    Alcohol and drug use screening performed        Subjective      HPI  Pt presents today with her son and her DIL to establish care with a new PCP. She lives with her son and DIL and her DIL notes she cares for pt and pt never leaves the house without her. Pt does not drive. They report pt has seizures, depression, polyuria and fhx diabetes, R arm pain and tingling, intermittent HA and migraine, and dyspnea when climbing stairs.    Pt reports she has polyuria and polydipsia. Has never been dx with diabetes. Denies dysuria. Her mother passed away about 3y ago from a diabetic coma.    Pt notes some paresthesia to her R 1st digit and intermittently pain that shoots up her arm. No reported injuries to the area, no falls. Has been going on about 2 weeks.    Pt used to take medication years ago, recently got a refill for depakote, otherwise her medications are out of date. Dx with epilepsy at 4yo. Does have a neuro referral, waiting to establish.     \"Needs to see a psychiatrist\". Has a hx of depression. She does reports toughts that she would be better off dead, and notes she has had suicidal ideating in the past. She denies recently considering killing herself, and reports she has never attempted suicide and doesn't have a plan if she were to, and states she doesn't think she would ever act on these thoughts. She notes this time of year is especially " "difficult for her and becomes tearful as she discusses losing her mother 3y ago and wishing she could see her again.     Pt can get short of breath when climbing stairs. She has had chest pain in the past but not recently.    Pt has had difficulty sleeping. Not sure why, but says she's just thinking about stuff. Sometimes she feels tired, but sometimes she can be up for days at a time and not feel the need to sleep, states she can be awake \"3 days in a row no problem\". She denies alcohol and other recreational substance use other than a few times a year will smoke marijuana. She has episodes of sleeplessness even when not using marijuana. She has taken something for sleep in the past but stopped because it suppressed dreams and she enjoys her good dreams though does have nightmares at times as well. She has used melatonin in the past with mixed results.    Blood pressure 170/103, including on recheck. Previously took an antihypertensive, DIL thinks it was lasix.    Gets HA sometimes, sometimes it progresses to be light sensitive. Denies changes in vision. Take tylenol and motrin to good effect.    Review of Systems   Constitutional:  Positive for fatigue. Negative for chills and fever.   HENT:  Negative for ear pain and sore throat.    Eyes:  Negative for pain and visual disturbance.   Respiratory:  Positive for shortness of breath (when climibing stairs). Negative for cough.    Cardiovascular:  Negative for chest pain and palpitations.   Gastrointestinal:  Negative for abdominal pain, diarrhea and vomiting.   Endocrine: Positive for polydipsia and polyuria.   Genitourinary:  Negative for dysuria and hematuria.        Has not had a period >1y   Musculoskeletal:  Negative for arthralgias and back pain.        R arm shooting pain form hand to shoulder   Skin:  Negative for color change and rash.   Neurological:  Positive for seizures and headaches. Negative for syncope.   Psychiatric/Behavioral:  Positive for " dysphoric mood and sleep disturbance.        Current Outpatient Medications on File Prior to Visit   Medication Sig    divalproex sodium (DEPAKOTE) 500 mg DR tablet Take 1 tablet (500 mg total) by mouth 2 (two) times a day    ondansetron (Zofran ODT) 4 mg disintegrating tablet Take 1 tablet (4 mg total) by mouth every 6 (six) hours as needed for nausea or vomiting    [DISCONTINUED] lamoTRIgine (LaMICtal) 25 mg tablet Take 25 mg by mouth daily    [DISCONTINUED] topiramate (TOPAMAX) 25 mg sprinkle capsule Take 25 mg by mouth 2 (two) times a day    polyvinyl alcohol (LIQUIFILM TEARS) 1.4 % ophthalmic solution Administer 1 drop to both eyes as needed for dry eyes (Patient not taking: Reported on 10/28/2022)    [DISCONTINUED] citalopram (CeleXA) 20 mg tablet Take 1 tablet by mouth daily for 30 days       Objective     BP (!) 170/108 (BP Location: Left arm, Patient Position: Sitting, Cuff Size: Large)   Pulse 82   Temp 98.5 °F (36.9 °C) (Tympanic)   Wt (!) 160 kg (351 lb 12.8 oz)   SpO2 95%   BMI 60.39 kg/m²     Physical Exam  Constitutional:       General: She is not in acute distress.     Appearance: She is obese. She is not ill-appearing.   HENT:      Head: Normocephalic and atraumatic.      Mouth/Throat:      Mouth: Mucous membranes are moist.      Pharynx: Oropharynx is clear.   Eyes:      General:         Right eye: No discharge.         Left eye: No discharge.      Conjunctiva/sclera: Conjunctivae normal.   Cardiovascular:      Rate and Rhythm: Normal rate and regular rhythm.      Heart sounds: No murmur heard.     No friction rub. No gallop.   Pulmonary:      Effort: Pulmonary effort is normal. No respiratory distress.      Breath sounds: Normal breath sounds.   Abdominal:      General: Bowel sounds are normal.      Palpations: Abdomen is soft. There is no mass.      Tenderness: There is no abdominal tenderness. There is no right CVA tenderness, left CVA tenderness or guarding.   Musculoskeletal:          General: Tenderness (R paraspinal muscles at level of scapula, R bicepital groove, R dorsal hand) present. No swelling or deformity.      Cervical back: Neck supple. No tenderness.   Lymphadenopathy:      Cervical: No cervical adenopathy.   Skin:     General: Skin is warm and dry.      Coloration: Skin is not jaundiced.   Neurological:      Mental Status: She is alert and oriented to person, place, and time.      Sensory: No sensory deficit.      Motor: No weakness.   Psychiatric:      Comments: At times tearful, affect appropriate, mood largely stable and becomes depressed when discussing mother       Houston Hogue MD

## 2024-04-09 ENCOUNTER — APPOINTMENT (OUTPATIENT)
Dept: LAB | Facility: HOSPITAL | Age: 54
End: 2024-04-09
Payer: COMMERCIAL

## 2024-04-09 DIAGNOSIS — R35.89 POLYURIA: ICD-10-CM

## 2024-04-09 DIAGNOSIS — G40.909 NONINTRACTABLE EPILEPSY WITHOUT STATUS EPILEPTICUS, UNSPECIFIED EPILEPSY TYPE (HCC): ICD-10-CM

## 2024-04-09 DIAGNOSIS — F33.2 SEVERE EPISODE OF RECURRENT MAJOR DEPRESSIVE DISORDER, WITHOUT PSYCHOTIC FEATURES (HCC): Chronic | ICD-10-CM

## 2024-04-09 DIAGNOSIS — Z13.220 SCREENING, LIPID: ICD-10-CM

## 2024-04-09 LAB
ALBUMIN SERPL BCP-MCNC: 3.7 G/DL (ref 3.5–5)
ALP SERPL-CCNC: 63 U/L (ref 34–104)
ALT SERPL W P-5'-P-CCNC: 15 U/L (ref 7–52)
ANION GAP SERPL CALCULATED.3IONS-SCNC: 8 MMOL/L (ref 4–13)
AST SERPL W P-5'-P-CCNC: 19 U/L (ref 13–39)
BASOPHILS # BLD AUTO: 0.04 THOUSANDS/ÂΜL (ref 0–0.1)
BASOPHILS NFR BLD AUTO: 1 % (ref 0–1)
BILIRUB SERPL-MCNC: 0.46 MG/DL (ref 0.2–1)
BUN SERPL-MCNC: 15 MG/DL (ref 5–25)
CALCIUM SERPL-MCNC: 9 MG/DL (ref 8.4–10.2)
CHLORIDE SERPL-SCNC: 103 MMOL/L (ref 96–108)
CHOLEST SERPL-MCNC: 192 MG/DL
CO2 SERPL-SCNC: 28 MMOL/L (ref 21–32)
CREAT SERPL-MCNC: 0.73 MG/DL (ref 0.6–1.3)
EOSINOPHIL # BLD AUTO: 0.12 THOUSAND/ÂΜL (ref 0–0.61)
EOSINOPHIL NFR BLD AUTO: 2 % (ref 0–6)
ERYTHROCYTE [DISTWIDTH] IN BLOOD BY AUTOMATED COUNT: 12.5 % (ref 11.6–15.1)
GFR SERPL CREATININE-BSD FRML MDRD: 94 ML/MIN/1.73SQ M
GLUCOSE P FAST SERPL-MCNC: 89 MG/DL (ref 65–99)
HCT VFR BLD AUTO: 39.6 % (ref 34.8–46.1)
HDLC SERPL-MCNC: 59 MG/DL
HGB BLD-MCNC: 12.6 G/DL (ref 11.5–15.4)
IMM GRANULOCYTES # BLD AUTO: 0.11 THOUSAND/UL (ref 0–0.2)
IMM GRANULOCYTES NFR BLD AUTO: 2 % (ref 0–2)
LDLC SERPL CALC-MCNC: 120 MG/DL (ref 0–100)
LYMPHOCYTES # BLD AUTO: 1.06 THOUSANDS/ÂΜL (ref 0.6–4.47)
LYMPHOCYTES NFR BLD AUTO: 17 % (ref 14–44)
MCH RBC QN AUTO: 30.8 PG (ref 26.8–34.3)
MCHC RBC AUTO-ENTMCNC: 31.8 G/DL (ref 31.4–37.4)
MCV RBC AUTO: 97 FL (ref 82–98)
MONOCYTES # BLD AUTO: 0.43 THOUSAND/ÂΜL (ref 0.17–1.22)
MONOCYTES NFR BLD AUTO: 7 % (ref 4–12)
NEUTROPHILS # BLD AUTO: 4.61 THOUSANDS/ÂΜL (ref 1.85–7.62)
NEUTS SEG NFR BLD AUTO: 71 % (ref 43–75)
NRBC BLD AUTO-RTO: 0 /100 WBCS
PLATELET # BLD AUTO: 178 THOUSANDS/UL (ref 149–390)
PMV BLD AUTO: 10.6 FL (ref 8.9–12.7)
POTASSIUM SERPL-SCNC: 3.9 MMOL/L (ref 3.5–5.3)
PROT SERPL-MCNC: 7 G/DL (ref 6.4–8.4)
RBC # BLD AUTO: 4.09 MILLION/UL (ref 3.81–5.12)
SODIUM SERPL-SCNC: 139 MMOL/L (ref 135–147)
TRIGL SERPL-MCNC: 64 MG/DL
TSH SERPL DL<=0.05 MIU/L-ACNC: 1.77 UIU/ML (ref 0.45–4.5)
WBC # BLD AUTO: 6.37 THOUSAND/UL (ref 4.31–10.16)

## 2024-04-09 PROCEDURE — 84443 ASSAY THYROID STIM HORMONE: CPT

## 2024-04-09 PROCEDURE — 36415 COLL VENOUS BLD VENIPUNCTURE: CPT

## 2024-04-09 PROCEDURE — 80053 COMPREHEN METABOLIC PANEL: CPT

## 2024-04-09 PROCEDURE — 85025 COMPLETE CBC W/AUTO DIFF WBC: CPT

## 2024-04-09 PROCEDURE — 80061 LIPID PANEL: CPT

## 2024-04-15 ENCOUNTER — TELEPHONE (OUTPATIENT)
Dept: FAMILY MEDICINE CLINIC | Facility: CLINIC | Age: 54
End: 2024-04-15

## 2024-04-15 DIAGNOSIS — Z87.898 HISTORY OF SEIZURES: Primary | ICD-10-CM

## 2024-04-15 NOTE — TELEPHONE ENCOUNTER
Leslie from [facility] Called in requesting a Physician to Physician order Place in [facility] for the Patient she has a visit scheduled for 4/22/24     Dx code : Z 87.898 for History of Seizures

## 2024-04-15 NOTE — TELEPHONE ENCOUNTER
I placed a referral to Neurology with the History of Seizures as the associated diagnosis, I believe that is what is requested here.

## 2024-04-22 ENCOUNTER — CONSULT (OUTPATIENT)
Dept: NEUROLOGY | Facility: CLINIC | Age: 54
End: 2024-04-22
Payer: COMMERCIAL

## 2024-04-22 VITALS
DIASTOLIC BLOOD PRESSURE: 78 MMHG | OXYGEN SATURATION: 98 % | HEIGHT: 64 IN | HEART RATE: 77 BPM | RESPIRATION RATE: 16 BRPM | WEIGHT: 293 LBS | BODY MASS INDEX: 50.02 KG/M2 | TEMPERATURE: 97.7 F | SYSTOLIC BLOOD PRESSURE: 122 MMHG

## 2024-04-22 DIAGNOSIS — G43.011 INTRACTABLE MIGRAINE WITHOUT AURA AND WITH STATUS MIGRAINOSUS: ICD-10-CM

## 2024-04-22 DIAGNOSIS — G40.909 RECURRENT SEIZURES (HCC): ICD-10-CM

## 2024-04-22 DIAGNOSIS — R51.9 CHRONIC DAILY HEADACHE: ICD-10-CM

## 2024-04-22 DIAGNOSIS — F33.2 SEVERE EPISODE OF RECURRENT MAJOR DEPRESSIVE DISORDER, WITHOUT PSYCHOTIC FEATURES (HCC): Chronic | ICD-10-CM

## 2024-04-22 DIAGNOSIS — G40.909 NONINTRACTABLE EPILEPSY WITHOUT STATUS EPILEPTICUS, UNSPECIFIED EPILEPSY TYPE (HCC): ICD-10-CM

## 2024-04-22 DIAGNOSIS — G40.909 EPILEPSY (HCC): Primary | ICD-10-CM

## 2024-04-22 DIAGNOSIS — G44.40 MEDICATION OVERUSE HEADACHE: ICD-10-CM

## 2024-04-22 DIAGNOSIS — R29.2 HYPERREFLEXIA: ICD-10-CM

## 2024-04-22 PROCEDURE — 99245 OFF/OP CONSLTJ NEW/EST HI 55: CPT | Performed by: PSYCHIATRY & NEUROLOGY

## 2024-04-22 RX ORDER — TOPIRAMATE 25 MG/1
75 TABLET ORAL 2 TIMES DAILY
Qty: 42 TABLET | Refills: 0 | Status: SHIPPED | OUTPATIENT
Start: 2024-04-22 | End: 2024-04-29

## 2024-04-22 RX ORDER — DIAZEPAM 5 MG/1
TABLET ORAL
Qty: 2 TABLET | Refills: 0 | Status: SHIPPED | OUTPATIENT
Start: 2024-04-22

## 2024-04-22 RX ORDER — SUMATRIPTAN 25 MG/1
TABLET, FILM COATED ORAL
Qty: 9 TABLET | Refills: 5 | Status: SHIPPED | OUTPATIENT
Start: 2024-04-22

## 2024-04-22 RX ORDER — DEXAMETHASONE 4 MG/1
4 TABLET ORAL
Qty: 5 TABLET | Refills: 0 | Status: SHIPPED | OUTPATIENT
Start: 2024-04-22 | End: 2024-04-27

## 2024-04-22 RX ORDER — TOPIRAMATE 100 MG/1
100 TABLET, FILM COATED ORAL 2 TIMES DAILY
Qty: 60 TABLET | Refills: 5 | Status: SHIPPED | OUTPATIENT
Start: 2024-04-22

## 2024-04-22 RX ORDER — DIVALPROEX SODIUM 500 MG/1
500 TABLET, DELAYED RELEASE ORAL 2 TIMES DAILY
Qty: 60 TABLET | Refills: 5 | Status: SHIPPED | OUTPATIENT
Start: 2024-04-22

## 2024-04-22 NOTE — PATIENT INSTRUCTIONS
Plan:   Recurrent seizures; possible generalized genetic epilepsy versus psychogenic nonepileptic seizures.  - MRI brain w/wo contrast  - sleep deprived EEG study  - continue with Divalproex DR (Depakote) 500mg tab take one tab twice a day  - will increase dose of topiramate (which may help transition off of Divalproex)  - prior to MRI brain study check topiramate, valproic acid level, CMP  For claustrophobia   - Use diazepam (Valium) 5mg tab, take one tab 15-30 minutes prior to MRI study, may repeat one additional tab if still having anxiety symptoms near time of study.  Side effects include excessive sedation, paradoxical agitation, ataxia, slurred speech, glaucoma, and respiratory depression (in the setting of multiple CNS sedating medications).  Please have someone else drive the patient to and from the MRI location.  - If EEG study is normal will recommend epilepsy monitoring unit (EMU) study    Intractable migraines  - increase your dose of topiramate 25mg caps take two caps twice a day until no more 25mg capsule, then next refill if is for 25mg TABLETs take THREE twice a day for 7 days, then change to 100mg TABLET take one tab twice a day  I reviewed side effects of topiramate, which include, but not limited to, fevers, kidney stones, metabolic acidosis, renal insufficiency, mood swings, irritability, suicidal ideation, medication interactions, ataxia, incoordination, cognitive impairment, weight loss and complaints of tingling of the hands and feet, risk of heat stroke, and acute glaucoma.    When you take this medication it is recommended that you stay hydrated with at least 1 liter of water a day, more if possible, avoid long exposure to hot humid weather, if you are outside in the heat, have access to a cool damp towel to help cool off.  If you experience severe eye pain please have it evaluated by an eye doctor or ophthalmologist for acute glaucoma.  Call the office if you develop kidney stones or if the  medication is causing you to feel off balance or excessively sedated.    - you must stop taking Tylenol (this is causing medication overuse headache)  - start Dexamethasone (Decadron) 4mg one tab with breakfast for 5 days only  - you may use Sumatriptan (Imitrex) 25mg tab for severe headache take one tab as needed    Hyperreflexia, rule out cervical cord myelopathy  - MRI if cervical spine    Follow-up with [facility] in 3 months  If you have questions about your medications please call the office; you may need to schedule a nursing visit to review how to take your medications.    What is a seizure and what is epilepsy?    What is a seizure?  A seizure is a sudden repetitive electrical surge in the brain (an electrical storm of the brain).   During a seizure, the electrical storm in the brain can cause different people to do different things depending on what part of the brain and how much of the brain is affected.   For example: some people may have just an odd sensation, some just stare, or others may become unresponsive, fall, and shake all over.   Seizures are not a disease by themselves, but are the symptom of other disorders that can affect the brain.    What is epilepsy?  Epilepsy is a condition where people have recurrent unprovoked seizures.     What is difference between seizures and epilepsy?  A seizure is a single event, which can be provoked by certain medical conditions or occur as a symptom of epilepsy. Epilepsy is a chronic condition where the brain has a tendency to have recurrent seizures.     What happens during a seizure?  There are many different kinds of seizures:  Simple partial seizures: Isolated twitching, numbness, sweating, dizziness, nausea/vomiting, disturbances to hearing, vision, smell or taste. No loss of consciousness occurs, and the person remains aware of his/her environment.   Complex partial seizures: Staring, motionless, picking at clothes, smacking lips, swallowing  repeatedly or wandering around. The person is not aware of their surroundings and is not fully responsive.  Atonic seizures: “Drop attacks” or sudden, rapid fall to ground with rapid recovery.   Myoclonic seizures: Brief forceful jerks which can affect the whole body or just part of it.   Absence seizures: May appear to be daydreaming or “spacing out.” The person is momentarily unresponsive and unaware of what is happening around him/her.   Tonic seizures: Stiffening of part or of the entire body.  Generalized Tonic-Clonic Seizures. “Grand-mal seizure.” Sudden loss of consciousness with body stiffening followed by continuous jerking movements. A blue tinge around the mouth is likely but lack of oxygen is rare. Loss of bladder and/or bowel control may occur.    What causes epilepsy?  In about seven out of every ten patients with epilepsy, no cause can be found.   Among the rest, there are many different causes. Some people have a genetic/ inherited cause or are born with a malformation of the brain. Other people may develop epilepsy from an injury to the brain such as infection (meningitis/encephalitis), traumatic brain injury, stroke, or brain tumor.    Who gets epilepsy?  Epilepsy is the fourth most common neurologic disorder.   Epilepsy can develop at any time of life, but most commonly develops in childhood or in later life.   About 4% of people will have a seizure at some point of their life and about 1% of people have epilepsy.    How is epilepsy diagnosed?  Epilepsy is a spectrum of disorders (many types and causes of epilepsy). Your doctor will determine if you have epilepsy or if you are at a high risk of having another seizure based on the type of seizures/events, medical history, physical examination, and ancillary tests (brain imaging, EEG, and blood tests).   Some medical conditions can cause spells that look very similar to an epileptic seizure, but are not actually caused by an electrical storm of the  brain. For this reason, sometimes your doctor may recommend admission to the hospital to help make a diagnosis.     What tests can I expect to be performed?  A complete history and neurologic examination; if a witness is available, please have the witness communicate with your doctor to describe the event/seizure,  An electroencephalogram (EEG) test, which measures the electrical signals of the brain  Magnetic resonance imaging (MRI) of your brain  Blood tests  Other tests may be needed depending on your epilepsy type or your response to treatment    How is epilepsy treated?  Most people with epilepsy are treated with medications. There are many different kinds of medications used to treat epilepsy and each medication may work differently for each individual person.    In some people whose seizures do not stop with medications alone, other treatments like surgery, special diets, or implanted devices can be used.     What are the goals of epilepsy treatment?  With all patients, the goal is to be seizure free without any medication side. Even if this this goal hasn’t been met, we always continue to work toward seizure freedom.   We always work to help people live full and happy lives.    Will I always have seizures?  About 6 out of 10 people will become seizure free in the first few years of treatment.   About 2-3 out of 10 people will have uncontrolled epilepsy.  With continued work, these people can have improvements in their seizure frequency and quality of life.    Will I always need to take medications?  This is a very complex question and depends on your personal situation. Your doctor will be able to help guide you.  For example: some children outgrow their epilepsy as the brain develops, but other kinds of epilepsy can be life-long.  Most patients stay on medications or at least 2 or more years before looking into the possibility of coming off medications.  Some tests (repeat EEGs or MRI scans) may be  considered to help determine if patients are at a higher risk of having additional seizures.  A person’s living situation needs to be considered. Issues like work, home life, driving, or family planning play a big role in determining if coming off of medications is right for that person.  If a trial of coming off medications is felt to be appropriate, medications are typically slowly decreased.  This allows patients and doctors to detect problems or seizures when they are small and less likely to cause other problems.    For more information about seizures and epilepsy, you can visit the web pages for: National Epile[employer] Foundation: www.epilepsy.com.    SEIZURE SAFETY    Don’t let fear of seizures keep you at home. Be smart about your activities to make sure you are safe. The guidelines below can help you be as safe as possible.    Make sure the people around you are aware of:  What happens when you have a seizure  Correct seizure first aid  First aid for choking (consider taking a basic life support class)  When they should know to call 911 or for medical help    Avoid common triggers of seizures:  Missing your medications  Not getting enough sleep  Drinking alcohol  Using illegal drugs    Safety measures for at home:  In the bathroom:   Do not take baths in the bathtub. Instead, take only showers. Try to have a family member available while you are in the shower.  Make sure the drain in the bathtub/shower is working properly to avoid pooling of water.  You can consider a fitted shower seat. Recessed soap trays can minimize injury if you would happen to fall in the shower.   Bathroom doors can be hung to open outwards, so that the door can still be opened if you fall against the door.   Do not lock the bathroom door. Use an “Occupied” sign on the door or other signal to let others know you are in the bathroom.  Safety locks can be obtained from the [employer].  On your water heater, set your water  temperature to a warm temperature that is not scalding to avoid burns from very hot water.   Put non-skid strips/ in the bathtub.  Use an electric shaver.  Avoid any electrical appliances (including electric shaver) in the bathroom or near water.  Use shatterproof glass for mirrors.    In the kitchen:   When possible, cook using a microwave.  Only cook or use electrical appliances when someone else is in the house and available.   As much as possible, grill food and avoid fryers or frying food.  Use the back burners of the stove and turn the pot handles toward the back of the stove.  Avoid carrying hot pans, pots of boiling water, or very hot food. Serve food or liquids directly from the stove. At the least, minimize the distance hot food is carried.   Use precut foods or food processers to limit the need to use knives.   Use plastic or durable cups, dishes, and containers instead of breakable glass items.    In the bedroom  Low level and wide beds (like a futon) can reduce risk of injury of falling out of bed. If there is a high risk of falling out of bed, you can consider simply putting the mattress on the floor.  Avoid sleeping on top bunks of bunk beds.   Place a soft carpet on the floor.    Around the house  Pad sharp corners of tables, chairs, etc. Round tables and furniture can be considered to avoid sharp corners.   Avoid open flames. Place a screen in front of fireplaces and don’t build a fire alone.   Allow for open spaces with furniture.  Avoid loose throw rugs or slippery floors. Non-slip marcio or cushioned marcio can help reduce injury from a fall.   Fireproof fabrics and furniture are best, and especially important if you smoke.  Doors and windows with safety glass are safer if someone falls against them.  Avoid lights and heaters that could easily be knocked over.  Use curling irons or clothing irons that have automatic shut off switches.  Make sure motor driven equipment or lawn mowers have  “dead man’s” handles or seats so they will turn off if you release pressure.    Safety measures when away from home  Driving  [place] law mandates that you cannot drive for 6 months after your most recent seizure.   [place] law mandates that you cannot drive for 6 months after your most recent seizure.    Work/Travel  Wear a medical alert bracelet or necklace at all times.  Wear a helmet and use protective clothing/equipment when appropriate.  Consider telling co-workers and travel companions that you have epilepsy and what to do if you have a seizure.  Avoid irregular shifts or disruptions of a regular sleep pattern.  Take your medications on time and keep an updated list of medications in your purse or wallet.  Do not climb to heights or operate heavy machinery.  Stand back from the edges of roads or bus/train platforms when traveling.  If you wander when you have a seizure, take a friend along for the trip.    Recreation  Swimming can be dangerous. Do not swim alone, in open water, or in murky water that you cannot see the bottom.   Caregivers should be with you in the pool at all times and must be physically able to get you out of the water.  Use a flotation device.   Scuba diving is not recommended since during a seizure people are unaware of their surroundings.  Having a seizure underwater can be deadly and can endanger the lives of others.  Kayaking and canoeing can be especially dangerous  People with epilepsy are at a higher risk of becoming trapped underneath a canoe or kayak.  Wear a helmet when playing contact sports, biking, or if there is a risk of falling.  Patients with epilepsy are at a higher risk of head injury when playing contact sports.  Avoid riding a bike, running, or other activities around busy roads, steep hills, or secluded areas.   Exercise on soft surfaces.  [place]: many people with epilepsy can go on rides depending on their type of seizures.  For some people, stress or  excitement can trigger a seizure.   Rollercoasters (especially if you are upside-down) are more dangerous for people with epilepsy.      Medications  Take your medications on time. If you have trouble remembering, set alarms on your phone. You can visit www.jhsbtqs9kifbmfs.com to set up reminders through text message to help you remember to take your medications.  Use a pillbox to help you keep track of your medications.  When out of the house, take any needed medications with you. Consider keeping one or two extra doses with you in case you are unexpectedly away from home longer than planned.  If you realize you missed a dose of your medications and it is less than 2 hours until your next dose, skip the missed dose. Do not double up your medication dose. If it is more than 2 hours until your next dose, you can take the missed dose.   Avoid drinking alcohol, since this can enhance effects of your seizure medications.  Be aware of common and major side effects of your medications.  Keep your medications out of the reach of children.    Parenting:  Childproof your home as much as possible.  It is possible that you could drop your baby if you have a seizure while holding or feeding them.  If you are nursing a baby, sit on the floor or bed with your back supported so the baby will not fall far if you should lose consciousness.  Feed the baby while he or she is seated in an infant seat.  Dress, change, and sponge bathe the baby on the floor.  Move the baby around in a stroller or small crib.  Keep a young baby in a playpen when you are alone, and a toddler in an indoor play yard, or childproof one room and use safety pradhan at the doors.  When out of the house, use a bungee-type cord or restraint harness so your child cannot wander away if you have a seizure that affects your awareness.    Video Electroencephalopathy (VEEG) and the Epilepsy Monitoring Unit (EMU)    What is video EEG?  In video-EEG, you are video taped at  the same time as your brain waves are recorded.   This typically occurs in the hospital over a long period of time, often an average of 3-7 days.   The doctor is able to review both the video and EEG at the same time to see exactly what your brain waves are doing while watching what your body is doing.     Why do I need video-EEG?  Typically the reasons to have a video-EEG study are to make a diagnosis, classify the type of seizures and epilepsy, and if medications do not prevent the seizures then offer an alternative therapy (pre-surgical evaluation).   Some people have events that look like an epileptic seizure (caused by electrical storm of the brain); but are in fact not due to abnormal electrical activity in the brain.  Other causes include cardiac or vascular pathology, other neurological causes such as tics and movement disorders, or psychological / psychogenic nonepileptic events.  There are also many different kinds of epileptic seizures.  The video EEG will help classify the seizures and epilepsy syndrome to determine the best medications or treatments.   Nearly a third of epilepsy patients are medically refractory (failed 2 or more appropriate anti-seizure medications) and epilepsy surgery may be an option for these patients. VEEG is used to evaluate where seizures start in the brain, determine if surgery may be possible and guide the specific surgical approach for patients who are found to be surgical candidates.     What to expect in the Epilepsy Monitoring Unit (EMU)  The EMU is a specialized inpatient unit in the hospital specially designed for evaluation of seizure disorders. The unit is equipped with computer-based monitoring equipment, certified EEG technologists, trained nurses, and attending physicians trained in the management of epilepsy.   In the EMU, seizures are recorded and specially reviewed so that a proper diagnosis can be made and treatment can be optimized.   Each patient will have a  private room and a private bathroom.   Patients will be connected to video-EEG equipment continuously (24 hours a day).   There is no video recording while in the restroom, but brain waves are recorded on the EEG at all times.   Although this can be bothersome, continuous monitoring at all times is necessary to make sure patients are safe and that the necessary information is collected.   Because patients are connected to recording equipment, mobility is restricted to the patient’s room.    Patients are not be able to take a shower or wash your hair while on EEG monitoring  This would interfere with your EEG electrodes.   Washing with a basin or sink is permitted.   Patients are asked to activate a push button when they experience an event and then state aloud what they are experiencing.   Nurses will test the patient during an event to help the doctors see what is occurring.  To increase the chance of capturing a seizure in a limited amount of time a variety of “activation procedures” are employed.  Sleep deprivation (no naps during the day and attempts to keep you awake all night as often as every other night)  Photic stimulation and hyperventilation procedures  Weaning or withdrawal of medications used to control seizures (the attending physician will discuss this with you).   You will need to have a peripheral IV placed in your hand or arm. This allows the doctors to give “rescue” medications in the event of a medical emergency while you are in the hospital. You also may be given a blood thinner in the form of daily subcutaneous injections to prevent deep venous thrombosis (DVT).  The hospital is a non-smoking facility, therefore smoking is not allowed.  Chewing gum is not allowed, this creates artifact on the EEG.  If you are a woman of childbearing age, you may be asked to take a urine pregnancy test.  We strive to make the EMU as safe as possible.  Throughout the world video EEG monitoring is the standard of  care and thousands of patients have been admitted to epilepsy monitoring units and there are rare cases of complications due to severely difficult to control epilepsy.  These have included seizure clusters, status epilepticus (seizures that do not stop with typical medications and advanced measures are required), injuries (fractures and head injury), and very rarely death.    How long do patients stay in the EMU?  The length of time varies for each patient.   Prepare to stay about 1 week (even though it may not last that long).  Typically, patients stay between 3-7 days, but may stay more or less time in special circumstances.     Things you can do to prepare for admission  Take a shower and wash your hair the night before or the morning of admission.   Do not use any hair products such as gels, sprays, mousse, or hair weaves.  It is NOT necessary to cut your hair or shave your head for the test.   Unless you received specific instructions from your physician, continue to take your medications as you normally do, including the morning of your admission.  Bring all of your current medications in the original prescription bottles to the hospital (some specialty medications may not be available in the hospital).   Bring a complete list of your medical and surgical history  Bring your seizure calendar  Wear comfortable clothing or pajamas.   Button-down shirts and elastic-waist pants are preferred.   You can bring slippers or sneakers for when you are walking around the room.   You can bring activities like computers, phones, cards, music, movies, etc. with you to keep you occupied.   Electronic devices cannot be plugged in while you are touching them (this interferes with the equipment), but can be charging on the other side of the room.   If you need any medical devices (such as a CPAP for obstructive sleep apnea), please bring it to the hospital.

## 2024-04-22 NOTE — PROGRESS NOTES
Review of Systems   Constitutional:  Negative for appetite change, fatigue and fever.   HENT:  Positive for hearing loss. Negative for tinnitus, trouble swallowing and voice change.    Eyes: Negative.  Negative for photophobia, pain and visual disturbance.   Respiratory: Negative.  Negative for shortness of breath.    Cardiovascular: Negative.  Negative for palpitations.   Gastrointestinal: Negative.  Negative for nausea and vomiting.   Endocrine: Negative.  Negative for cold intolerance.   Genitourinary:  Positive for urgency. Negative for dysuria and frequency.   Musculoskeletal:  Positive for back pain. Negative for gait problem, myalgias, neck pain and neck stiffness.   Skin:  Positive for rash.   Allergic/Immunologic: Negative.    Neurological:  Positive for dizziness, tremors, seizures (2 wks ago not sure about the time), light-headedness, numbness and headaches. Negative for syncope, facial asymmetry and speech difficulty.   Hematological: Negative.  Does not bruise/bleed easily.   Psychiatric/Behavioral:  Positive for sleep disturbance. Negative for confusion and hallucinations. The patient is nervous/anxious.    All other systems reviewed and are negative.
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
## 2024-04-24 ENCOUNTER — TELEPHONE (OUTPATIENT)
Dept: PSYCHIATRY | Facility: CLINIC | Age: 54
End: 2024-04-24

## 2024-04-24 NOTE — TELEPHONE ENCOUNTER
Called pt in regards to referral rcvd, verify needs of services and inform of current wait list. Pt states is interested in talk therapy services.Pt added to proper wait list. Closing referral.

## 2024-05-05 DIAGNOSIS — F33.2 SEVERE EPISODE OF RECURRENT MAJOR DEPRESSIVE DISORDER, WITHOUT PSYCHOTIC FEATURES (HCC): Chronic | ICD-10-CM

## 2024-05-06 RX ORDER — CITALOPRAM 20 MG/1
20 TABLET ORAL DAILY
Qty: 30 TABLET | Refills: 0 | Status: SHIPPED | OUTPATIENT
Start: 2024-05-06

## 2024-05-08 PROBLEM — Z13.220 SCREENING, LIPID: Status: RESOLVED | Noted: 2024-04-08 | Resolved: 2024-05-08

## 2024-05-20 ENCOUNTER — HOSPITAL ENCOUNTER (OUTPATIENT)
Dept: RADIOLOGY | Age: 54
Discharge: HOME | End: 2024-05-20

## 2024-05-20 DIAGNOSIS — G40.909 EPILEPSY (HCC): ICD-10-CM

## 2024-05-20 DIAGNOSIS — R29.2 HYPERREFLEXIA: ICD-10-CM

## 2024-05-21 ENCOUNTER — TELEPHONE (OUTPATIENT)
Dept: NEUROLOGY | Facility: CLINIC | Age: 54
End: 2024-05-21

## 2024-05-21 DIAGNOSIS — R29.2 HYPERREFLEXIA: ICD-10-CM

## 2024-05-21 DIAGNOSIS — G40.909 NONINTRACTABLE EPILEPSY WITHOUT STATUS EPILEPTICUS, UNSPECIFIED EPILEPSY TYPE (HCC): Primary | ICD-10-CM

## 2024-05-21 NOTE — TELEPHONE ENCOUNTER
Spoke to patient. Preference is MRI with anesthesia. Please update order. Once entered, can assist with scheduling.

## 2024-05-21 NOTE — TELEPHONE ENCOUNTER
"----- Message from Case Moise MD sent at 5/20/2024  3:15 PM EDT -----  Regarding: unable to complete MRI brain/cervical spine study  Please reach out to patient.  I received a TigerText from radiology saying that she was not able to complete the MRI studies because she could not lie flat.  It could be because of her body habitus.  Options are to try to schedule an MRI brain study to be done under anesthesia or sedation or to have her get the MRI Brain and cervical spine studies to be done at an \"open\" MRI center.    Ashlyn"

## 2024-05-21 NOTE — TELEPHONE ENCOUNTER
New orders for MRI brain w/wo and MRI cervical spine without contrast are ordered with anesthesia.

## 2024-06-13 DIAGNOSIS — F33.2 SEVERE EPISODE OF RECURRENT MAJOR DEPRESSIVE DISORDER, WITHOUT PSYCHOTIC FEATURES (HCC): Chronic | ICD-10-CM

## 2024-06-13 RX ORDER — CITALOPRAM 20 MG/1
20 TABLET ORAL DAILY
Qty: 30 TABLET | Refills: 5 | Status: SHIPPED | OUTPATIENT
Start: 2024-06-13

## 2024-06-18 ENCOUNTER — VBI (OUTPATIENT)
Dept: ADMINISTRATIVE | Facility: OTHER | Age: 54
End: 2024-06-18

## 2024-06-18 NOTE — TELEPHONE ENCOUNTER
06/18/24 1:15 PM     Chart reviewed for Pap Smear (HPV) aka Cervical Cancer Screening ; nothing is submitted to the patient's insurance at this time.     Edgardo Altamirano MA   [facility]

## 2024-07-15 ENCOUNTER — HOSPITAL ENCOUNTER (EMERGENCY)
Facility: HOSPITAL | Age: 54
Discharge: HOME | End: 2024-07-15
Attending: EMERGENCY MEDICINE
Payer: COMMERCIAL

## 2024-07-15 VITALS
DIASTOLIC BLOOD PRESSURE: 92 MMHG | TEMPERATURE: 98.5 F | SYSTOLIC BLOOD PRESSURE: 181 MMHG | RESPIRATION RATE: 18 BRPM | HEART RATE: 74 BPM | OXYGEN SATURATION: 94 %

## 2024-07-15 DIAGNOSIS — R03.0 ELEVATED BLOOD PRESSURE READING: ICD-10-CM

## 2024-07-15 DIAGNOSIS — M79.89 LEG SWELLING: Primary | ICD-10-CM

## 2024-07-15 LAB
ALBUMIN SERPL BCG-MCNC: 3.8 G/DL (ref 3.5–5)
ALP SERPL-CCNC: 55 U/L (ref 34–104)
ALT SERPL W P-5'-P-CCNC: 9 U/L (ref 7–52)
ANION GAP SERPL CALCULATED.3IONS-SCNC: 5 MMOL/L (ref 4–13)
AST SERPL W P-5'-P-CCNC: 24 U/L (ref 13–39)
BACTERIA UR QL AUTO: ABNORMAL /HPF
BASOPHILS # BLD AUTO: 0.03 THOUSANDS/ÂΜL (ref 0–0.1)
BASOPHILS NFR BLD AUTO: 1 % (ref 0–1)
BILIRUB SERPL-MCNC: 0.33 MG/DL (ref 0.2–1)
BILIRUB UR QL STRIP: NEGATIVE
BUN SERPL-MCNC: 13 MG/DL (ref 5–25)
CALCIUM SERPL-MCNC: 8.8 MG/DL (ref 8.4–10.2)
CHLORIDE SERPL-SCNC: 108 MMOL/L (ref 96–108)
CLARITY UR: ABNORMAL
CO2 SERPL-SCNC: 23 MMOL/L (ref 21–32)
COLOR UR: YELLOW
CREAT SERPL-MCNC: 0.75 MG/DL (ref 0.6–1.3)
D DIMER PPP FEU-MCNC: 0.32 UG/ML FEU
EOSINOPHIL # BLD AUTO: 0.09 THOUSAND/ÂΜL (ref 0–0.61)
EOSINOPHIL NFR BLD AUTO: 1 % (ref 0–6)
ERYTHROCYTE [DISTWIDTH] IN BLOOD BY AUTOMATED COUNT: 12.3 % (ref 11.6–15.1)
GFR SERPL CREATININE-BSD FRML MDRD: 91 ML/MIN/1.73SQ M
GLUCOSE SERPL-MCNC: 91 MG/DL (ref 65–140)
GLUCOSE UR STRIP-MCNC: NEGATIVE MG/DL
HCT VFR BLD AUTO: 40.2 % (ref 34.8–46.1)
HGB BLD-MCNC: 13.1 G/DL (ref 11.5–15.4)
HGB UR QL STRIP.AUTO: NEGATIVE
IMM GRANULOCYTES # BLD AUTO: 0.02 THOUSAND/UL (ref 0–0.2)
IMM GRANULOCYTES NFR BLD AUTO: 0 % (ref 0–2)
KETONES UR STRIP-MCNC: ABNORMAL MG/DL
LEUKOCYTE ESTERASE UR QL STRIP: ABNORMAL
LYMPHOCYTES # BLD AUTO: 1.02 THOUSANDS/ÂΜL (ref 0.6–4.47)
LYMPHOCYTES NFR BLD AUTO: 16 % (ref 14–44)
MCH RBC QN AUTO: 30.8 PG (ref 26.8–34.3)
MCHC RBC AUTO-ENTMCNC: 32.6 G/DL (ref 31.4–37.4)
MCV RBC AUTO: 94 FL (ref 82–98)
MONOCYTES # BLD AUTO: 0.34 THOUSAND/ÂΜL (ref 0.17–1.22)
MONOCYTES NFR BLD AUTO: 5 % (ref 4–12)
MUCOUS THREADS UR QL AUTO: ABNORMAL
NEUTROPHILS # BLD AUTO: 4.94 THOUSANDS/ÂΜL (ref 1.85–7.62)
NEUTS SEG NFR BLD AUTO: 77 % (ref 43–75)
NITRITE UR QL STRIP: NEGATIVE
NON-SQ EPI CELLS URNS QL MICRO: ABNORMAL /HPF
NRBC BLD AUTO-RTO: 0 /100 WBCS
PH UR STRIP.AUTO: 7.5 [PH]
PLATELET # BLD AUTO: 157 THOUSANDS/UL (ref 149–390)
PMV BLD AUTO: 11.5 FL (ref 8.9–12.7)
POTASSIUM SERPL-SCNC: 5.1 MMOL/L (ref 3.5–5.3)
PROT SERPL-MCNC: 7.2 G/DL (ref 6.4–8.4)
PROT UR STRIP-MCNC: ABNORMAL MG/DL
RBC # BLD AUTO: 4.26 MILLION/UL (ref 3.81–5.12)
RBC #/AREA URNS AUTO: ABNORMAL /HPF
SODIUM SERPL-SCNC: 136 MMOL/L (ref 135–147)
SP GR UR STRIP.AUTO: 1.02 (ref 1–1.03)
UROBILINOGEN UR STRIP-ACNC: <2 MG/DL
WBC # BLD AUTO: 6.44 THOUSAND/UL (ref 4.31–10.16)
WBC #/AREA URNS AUTO: ABNORMAL /HPF

## 2024-07-15 PROCEDURE — 99285 EMERGENCY DEPT VISIT HI MDM: CPT | Performed by: EMERGENCY MEDICINE

## 2024-07-15 PROCEDURE — 85379 FIBRIN DEGRADATION QUANT: CPT

## 2024-07-15 PROCEDURE — 36415 COLL VENOUS BLD VENIPUNCTURE: CPT

## 2024-07-15 PROCEDURE — 93005 ELECTROCARDIOGRAM TRACING: CPT

## 2024-07-15 PROCEDURE — 99284 EMERGENCY DEPT VISIT MOD MDM: CPT

## 2024-07-15 PROCEDURE — 85025 COMPLETE CBC W/AUTO DIFF WBC: CPT

## 2024-07-15 PROCEDURE — 80053 COMPREHEN METABOLIC PANEL: CPT

## 2024-07-15 PROCEDURE — 81001 URINALYSIS AUTO W/SCOPE: CPT

## 2024-07-15 RX ORDER — LIDOCAINE 50 MG/G
1 PATCH TOPICAL ONCE
Status: DISCONTINUED | OUTPATIENT
Start: 2024-07-15 | End: 2024-07-15 | Stop reason: HOSPADM

## 2024-07-15 RX ORDER — LIDOCAINE 50 MG/G
1 PATCH TOPICAL ONCE
Status: CANCELLED | OUTPATIENT
Start: 2024-07-15 | End: 2024-07-15

## 2024-07-15 RX ADMIN — LIDOCAINE 5% 1 PATCH: 700 PATCH TOPICAL at 16:20

## 2024-07-15 NOTE — ED PROVIDER NOTES
History  Chief Complaint   Patient presents with    Leg Swelling     Patient states she takes water pills but states her BL legs have been swelling more within the last 3 days. Denies CP/SOB.      54-year-old female with history of epilepsy presents with leg swelling.  Patient states 3 to 4-day history of worsening symmetrical bilateral lower extremity swelling traveling up the legs for which she has been taking water pills for the past 6 months.  She is unsure of what water pill she takes.  On review of triage medications no water pills noted.  Patient showed medications from purse, new water pill noted, Lexapro, topiramate, lamotrigine, bowel valproic sodium.  Periodic low back pain with muscle spasms causing her not to be able to move.  Denies other symptoms.  Denies recent medication changes  Denies fevers, chills, chest pain, palpitations, shortness of breath, exertional dyspnea, abdominal pain, dysuria, frequency, urgency, changes in urinary habits or quantity, changes in bowel movements, leg pain, cellulitic changes of the lower extremities, inability to ambulate, weakness, saddle paresthesia, history of cancer, trauma, midline spinal tenderness, drug use.          Prior to Admission Medications   Prescriptions Last Dose Informant Patient Reported? Taking?   Blood Pressure Monitoring (Adult Blood Pressure Cuff Lg) KIT  Self No No   Sig: Use 2 (two) times a day   SUMAtriptan (IMITREX) 25 mg tablet   No No   Sig: Take 1 tab at the start of a migraine headache, may repeat 1 tab in 2 hours.   citalopram (CeleXA) 20 mg tablet   No No   Sig: TAKE 1 TABLET BY MOUTH EVERY DAY   diazepam (VALIUM) 5 mg tablet   No No   Sig: Take 1 tab as needed for anxiety 30 minutes prior to MRI study, may repeat second dose if needed if still anxious 30 minutes later.   divalproex sodium (DEPAKOTE) 500 mg DR tablet   No No   Sig: Take 1 tablet (500 mg total) by mouth 2 (two) times a day   ondansetron (Zofran ODT) 4 mg disintegrating  tablet  Self No No   Sig: Take 1 tablet (4 mg total) by mouth every 6 (six) hours as needed for nausea or vomiting   topiramate (Topamax) 100 mg tablet   No No   Sig: Take 1 tablet (100 mg total) by mouth 2 (two) times a day   topiramate (Topamax) 25 mg tablet   No No   Sig: Take 3 tablets (75 mg total) by mouth 2 (two) times a day for 7 days Then change to 100mg tabs      Facility-Administered Medications: None       Past Medical History:   Diagnosis Date    Depression     DVT (deep venous thrombosis) ([facility])     Migraine     Seizures ([facility]CC)     Suicide attempt (HCC)        Past Surgical History:   Procedure Laterality Date     SECTION      CHOLECYSTECTOMY         Family History   Problem Relation Age of Onset    Seizures Mother     Seizures Brother     Seizures Maternal Aunt      I have reviewed and agree with the history as documented.    E-Cigarette/Vaping    E-Cigarette Use Never User      E-Cigarette/Vaping Substances     Social History     Tobacco Use    Smoking status: Never    Smokeless tobacco: Never   Vaping Use    Vaping status: Never Used   Substance Use Topics    Alcohol use: Not Currently    Drug use: No        Review of Systems   All other systems reviewed and are negative.      Physical Exam  ED Triage Vitals [07/15/24 1550]   Temperature Pulse Respirations Blood Pressure SpO2   98.5 °F (36.9 °C) 74 18 (!) 181/92 94 %      Temp Source Heart Rate Source Patient Position - Orthostatic VS BP Location FiO2 (%)   Oral Monitor Sitting Right arm --      Pain Score       --             Orthostatic Vital Signs  Vitals:    07/15/24 1550   BP: (!) 181/92   Pulse: 74   Patient Position - Orthostatic VS: Sitting       Physical Exam  Vitals and nursing note reviewed.   Constitutional:       General: She is not in acute distress.     Appearance: Normal appearance. She is obese. She is not ill-appearing, toxic-appearing or diaphoretic.   HENT:      Head: Normocephalic and atraumatic.      Right Ear: External  ear normal.      Left Ear: External ear normal.      Nose: Nose normal.      Mouth/Throat:      Mouth: Mucous membranes are moist.      Pharynx: Oropharynx is clear.   Eyes:      General: No scleral icterus.        Right eye: No discharge.         Left eye: No discharge.      Extraocular Movements: Extraocular movements intact.   Neck:      Comments: No midline C/T/L/S spine tenderness, step-offs, deformities.  Full range of motion of the cervical spine without tenderness.    Cardiovascular:      Rate and Rhythm: Normal rate and regular rhythm.      Pulses: Normal pulses.      Heart sounds: Normal heart sounds. No murmur heard.  Pulmonary:      Effort: Pulmonary effort is normal. No respiratory distress.      Breath sounds: Normal breath sounds. No stridor. No wheezing, rhonchi or rales.   Chest:      Chest wall: No tenderness.   Abdominal:      General: There is no distension.      Palpations: Abdomen is soft. There is no mass.      Tenderness: There is no abdominal tenderness. There is no right CVA tenderness, left CVA tenderness, guarding or rebound.      Hernia: No hernia is present.   Musculoskeletal:         General: No swelling, tenderness, deformity or signs of injury. Normal range of motion.      Cervical back: Normal range of motion and neck supple. No rigidity or tenderness.      Right lower leg: Edema present.      Left lower leg: Edema present.      Comments: 1+ bilateral pitting edema up to the mid leg.  Compartments of the lower extremities are soft and nontender. DP/PT pulses 2+ bilaterally.  Full range of motion at the Hip/knee/ankle.  Sensation to light touch intact over distal extremities.     Skin:     General: Skin is warm and dry.      Capillary Refill: Capillary refill takes less than 2 seconds.      Coloration: Skin is not cyanotic, jaundiced or pale.      Findings: No bruising, erythema, lesion, petechiae or rash.   Neurological:      General: No focal deficit present.      Mental Status:  She is alert and oriented to person, place, and time. Mental status is at baseline.         ED Medications  Medications - No data to display      Diagnostic Studies  Results Reviewed       Procedure Component Value Units Date/Time    Urine Microscopic [[id number]]  (Abnormal) Collected: 07/15/24 1707    Lab Status: Final result Specimen: Urine, Clean Catch Updated: 07/15/24 1732     RBC, UA 2-4 /hpf      WBC, UA 1-2 /hpf      Epithelial Cells Moderate /hpf      Bacteria, UA Occasional /hpf      MUCUS THREADS Occasional    UA w Reflex to Microscopic w Reflex to Culture [[id number]]  (Abnormal) Collected: 07/15/24 1707    Lab Status: Final result Specimen: Urine, Clean Catch Updated: 07/15/24 1719     Color, UA Yellow     Clarity, UA Turbid     Specific Gravity, UA 1.021     pH, UA 7.5     Leukocytes, UA Small     Nitrite, UA Negative     Protein, UA Trace mg/dl      Glucose, UA Negative mg/dl      Ketones, UA Trace mg/dl      Urobilinogen, UA <2.0 mg/dl      Bilirubin, UA Negative     Occult Blood, UA Negative    D-Dimer [[id number]]  (Normal) Collected: 07/15/24 1650    Lab Status: Final result Specimen: Blood from Arm, Right Updated: 07/15/24 1716     D-Dimer, Quant 0.32 ug/ml FEU     Narrative:      In the evaluation for possible pulmonary embolism, in the appropriate (Well's Score of 4 or less) patient, the age adjusted d-dimer cutoff for this patient can be calculated as:    Age x 0.01 (in ug/mL) for Age-adjusted D-dimer exclusion threshold for a patient over 50 years.    Comprehensive metabolic panel [[id number]] Collected: 07/15/24 1619    Lab Status: Final result Specimen: Blood from Arm, Right Updated: 07/15/24 1654     Sodium 136 mmol/L      Potassium 5.1 mmol/L      Chloride 108 mmol/L      CO2 23 mmol/L      ANION GAP 5 mmol/L      BUN 13 mg/dL      Creatinine 0.75 mg/dL      Glucose 91 mg/dL      Calcium 8.8 mg/dL      AST 24 U/L      ALT 9 U/L      Alkaline Phosphatase 55 U/L      Total Protein 7.2 g/dL       Albumin 3.8 g/dL      Total Bilirubin 0.33 mg/dL      eGFR 91 ml/min/1.73sq m     Narrative:      National Kid[employer] Disease Foundation guidelines for Chronic Kidney Disease (CKD):     Stage 1 with normal or high GFR (GFR > 90 mL/min/1.73 square meters)    Stage 2 Mild CKD (GFR = 60-89 mL/min/1.73 square meters)    Stage 3A Moderate CKD (GFR = 45-59 mL/min/1.73 square meters)    Stage 3B Moderate CKD (GFR = 30-44 mL/min/1.73 square meters)    Stage 4 Severe CKD (GFR = 15-29 mL/min/1.73 square meters)    Stage 5 End Stage CKD (GFR <15 mL/min/1.73 square meters)  Note: GFR calculation is accurate only with a steady state creatinine    CBC and differential [[id number]]  (Abnormal) Collected: 07/15/24 1619    Lab Status: Final result Specimen: Blood from Arm, Right Updated: 07/15/24 1639     WBC 6.44 Thousand/uL      RBC 4.26 Million/uL      Hemoglobin 13.1 g/dL      Hematocrit 40.2 %      MCV 94 fL      MCH 30.8 pg      MCHC 32.6 g/dL      RDW 12.3 %      MPV 11.5 fL      Platelets 157 Thousands/uL      nRBC 0 /100 WBCs      Segmented % 77 %      Immature Grans % 0 %      Lymphocytes % 16 %      Monocytes % 5 %      Eosinophils Relative 1 %      Basophils Relative 1 %      Absolute Neutrophils 4.94 Thousands/µL      Absolute Immature Grans 0.02 Thousand/uL      Absolute Lymphocytes 1.02 Thousands/µL      Absolute Monocytes 0.34 Thousand/µL      Eosinophils Absolute 0.09 Thousand/µL      Basophils Absolute 0.03 Thousands/µL                    No orders to display         Procedures  Procedures      ED Course  ED Course as of 07/15/24 2155   Mon Jul 15, 2024   1558 Blood Pressure(!): 181/92   1637 EKG normal sinus rhythm rate of 67, normal ME interval, normal QRS interval, QTc 441, normal axis, no ST elevations, no ST depressions, no ischemic changes or STEMI.   1720 D-Dimer, Quant: 0.32   1733 Epithelial Cells(!): Moderate  Likely contaminated.                                        Medical Decision Making  54-year-old  female presents with bilateral leg swelling.  Hemodynamically stable, elevated blood pressure while in the emergency department with no previous record for hypertension, denies cardiac, liver, renal, vascular, infectious issues.  Patient is in no acute distress, afebrile, neurovascularly intact without signs or symptoms of conus medullaris or cauda equina  Will get EKG due to hypertension for screening.  Basic labs to check liver function, and renal function.  UA to check for possible signs of low back pain other than MSK.    Labs showed no signs of renal failure, liver failure.  EKG within normal limits with no ischemic changes or signs of previous MI.  Hemodynamically stable.  Continues to ambulate with no neurovascular deficits and normal gait.  No systemic signs or symptoms of infection.  Discussed supportive therapy including elevation of the legs, decreased time standing, compression stockings.  Referral to family medicine clinic given due to elevated blood pressure and possible requirement for diuretics.  Patient discharged home to self-care with strict return precautions for signs and symptoms of renal failure, liver failure, VTE, heart failure, evolving infection.  Patient understanding and agreement with plan.    Amount and/or Complexity of Data Reviewed  Labs: ordered. Decision-making details documented in ED Course.    Risk  Prescription drug management.          Disposition  Final diagnoses:   Leg swelling   Elevated blood pressure reading     Time reflects when diagnosis was documented in both MDM as applicable and the Disposition within this note       Time User Action Codes Description Comment    7/15/2024  5:34 PM Ambrose Tate Add [M79.89] Leg swelling     7/15/2024  5:34 PM Ambrose Tate Add [R03.0] Elevated blood pressure reading           ED Disposition       ED Disposition   Discharge    Condition   Stable    Date/Time   Mon Jul 15, 2024  5:34 PM    Comment   Maddie Arteaga discharge to home/self  care.                   Follow-up Information       Follow up With Specialties Details Why Contact Info Additional Information    [facility] Emergency Department Emergency Medicine Go to  If symptoms worsen [address] [facility] [address]  [phone] [facility] Emergency Department, [address], [place], [address]    [facility] Family Medicine Call  Elevated blood pressure [address] [facility] [address] [phone] [facility], [address] [place], [place], [id number] [phone]            Discharge Medication List as of 7/15/2024  5:37 PM        CONTINUE these medications which have NOT CHANGED    Details   Blood Pressure Monitoring (Adult Blood Pressure Cuff Lg) KIT Use 2 (two) times a day, Starting Mon 4/8/2024, Normal      citalopram (CeleXA) 20 mg tablet TAKE 1 TABLET BY MOUTH EVERY DAY, Starting Thu 6/13/2024, Normal      diazepam (VALIUM) 5 mg tablet Take 1 tab as needed for anxiety 30 minutes prior to MRI study, may repeat second dose if needed if still anxious 30 minutes later., Normal      divalproex sodium (DEPAKOTE) 500 mg DR tablet Take 1 tablet (500 mg total) by mouth 2 (two) times a day, Starting Mon 4/22/2024, Normal      ondansetron (Zofran ODT) 4 mg disintegrating tablet Take 1 tablet (4 mg total) by mouth every 6 (six) hours as needed for nausea or vomiting, Starting Fri 10/28/2022, Normal      SUMAtriptan (IMITREX) 25 mg tablet Take 1 tab at the start of a migraine headache, may repeat 1 tab in 2 hours., Normal      topiramate (Topamax) 100 mg tablet Take 1 tablet (100 mg total) by mouth 2 (two) times a day, Starting Mon 4/22/2024, Normal               PDMP Review         Value Time User    PDMP Reviewed  Yes 4/22/2024  9:20 AM Case Moise MD             ED Provider  Attending physically available and evaluated Maddie Arteaga. I managed the  patient along with the ED Attending.    Electronically Signed by           Ambrose Tate MD  07/15/24 9811

## 2024-07-16 LAB
ATRIAL RATE: 67 BPM
P AXIS: 49 DEGREES
PR INTERVAL: 164 MS
QRS AXIS: 30 DEGREES
QRSD INTERVAL: 82 MS
QT INTERVAL: 418 MS
QTC INTERVAL: 441 MS
T WAVE AXIS: 45 DEGREES
VENTRICULAR RATE: 67 BPM

## 2024-07-16 PROCEDURE — 93010 ELECTROCARDIOGRAM REPORT: CPT | Performed by: INTERNAL MEDICINE

## 2024-08-12 ENCOUNTER — OFFICE VISIT (OUTPATIENT)
Dept: FAMILY MEDICINE CLINIC | Facility: CLINIC | Age: 54
End: 2024-08-12
Payer: COMMERCIAL

## 2024-08-12 VITALS
TEMPERATURE: 97.3 F | HEART RATE: 76 BPM | OXYGEN SATURATION: 96 % | DIASTOLIC BLOOD PRESSURE: 110 MMHG | SYSTOLIC BLOOD PRESSURE: 160 MMHG | WEIGHT: 293 LBS | BODY MASS INDEX: 56.13 KG/M2

## 2024-08-12 DIAGNOSIS — R51.9 CHRONIC DAILY HEADACHE: ICD-10-CM

## 2024-08-12 DIAGNOSIS — M79.89 LEG SWELLING: ICD-10-CM

## 2024-08-12 DIAGNOSIS — I10 PRIMARY HYPERTENSION: Primary | ICD-10-CM

## 2024-08-12 DIAGNOSIS — M54.50 CHRONIC RIGHT-SIDED LOW BACK PAIN WITHOUT SCIATICA: ICD-10-CM

## 2024-08-12 DIAGNOSIS — Z13.1 SCREENING FOR DIABETES MELLITUS: ICD-10-CM

## 2024-08-12 DIAGNOSIS — H91.90 HEARING LOSS, UNSPECIFIED HEARING LOSS TYPE, UNSPECIFIED LATERALITY: ICD-10-CM

## 2024-08-12 DIAGNOSIS — Z12.11 SCREENING FOR MALIGNANT NEOPLASM OF COLON: ICD-10-CM

## 2024-08-12 DIAGNOSIS — G89.29 CHRONIC RIGHT-SIDED LOW BACK PAIN WITHOUT SCIATICA: ICD-10-CM

## 2024-08-12 LAB — SL AMB POCT HEMOGLOBIN AIC: 5.7 (ref ?–6.5)

## 2024-08-12 PROCEDURE — 83036 HEMOGLOBIN GLYCOSYLATED A1C: CPT | Performed by: FAMILY MEDICINE

## 2024-08-12 PROCEDURE — 99213 OFFICE O/P EST LOW 20 MIN: CPT | Performed by: FAMILY MEDICINE

## 2024-08-12 RX ORDER — LISINOPRIL 5 MG/1
5 TABLET ORAL DAILY
Qty: 30 TABLET | Refills: 0 | Status: SHIPPED | OUTPATIENT
Start: 2024-08-12

## 2024-08-12 NOTE — PROGRESS NOTES
Ambulatory Visit  Name: Maddie Arteaga      : 1970      MRN: [id number]  Encounter Provider: Reddy Finch DO  Encounter Date: 2024   Encounter department: [facility]    Assessment & Plan   1. Primary hypertension  Assessment & Plan:  Multiple high blood pressure readings today in office 160/110.  Patient states that this is about her baseline at home. Patient has equipment and ability to check BP at home.  Patient has lost over 20 pounds in the last few months with intent due to dietary modifications + medication.  This will likely aid in her blood pressure management if it continues.     Add lisinopril 5mg and fu in 2 weeks. Repeat CMP in 2 weeks to make sure no rise in creatine or drop in gfr due to ace inhibitor  Orders:  -     lisinopril (ZESTRIL) 5 mg tablet; Take 1 tablet (5 mg total) by mouth daily  2. Leg swelling  Assessment & Plan:  Documented at last ED visit last month. trace edema with no red flags of chest pain or SOB. improving with weight loss per patient. manage HTN as above (dash diet reading provided). FU in two weeks. If patient develops worsening leg swelling with HTN control and dash diet or Cardiac symptoms would order echo cardio gram vs stress echo for further evaluation.   Orders:  -     Ambulatory Referral to Family Practice  3. Hearing loss, unspecified hearing loss type, unspecified laterality  Comments:  chronic. patient cannot identify any acute event that caused. Will send to audiology for comprensive hearing evaluation. fu in 2 weeks  Orders:  -     Ambulatory Referral to Audiology; Future  4. Chronic right-sided low back pain without sciatica  Assessment & Plan:  Tightness of lumbar and lower thoracic spine likely due to inactivity and obesity. No red flag symptoms or trauma. Will trial comprehensive spine PT for Back pain and exercise, Patient also urged to trial voltaren gel. If not improving can consider OMT or imagining if PT trial is  unsuccessful.   Orders:  -     Ambulatory Referral to Comprehensive Spine PT; Future  5. Screening for malignant neoplasm of colon  -     Cologjaylyn  6. Screening for diabetes mellitus  Comments:  a1c 5.7 consistent with prediabetes. will follow up next appointment with weight loss progress. consider addition of metformin  Orders:  -     POCT hemoglobin A1c  7. Chronic daily headache  Assessment & Plan:  Headache appears chronic and multifactorial.  Patient has been working with neurology elevated.  She does not understand proper use of medication.  Patient is taking her abortive immitrex once to twice daily with no release of her symptoms.  No change in headache character.  Patient is also taking Advil and Tylenol on a daily basis for very high suspicion for medication induced headache.  Due to her underlying seizure disorder patient has been attempting to obtain brain MRI from her neurologist.  Patient has been given Valium with extensive efforts from her neurologist in order to obtain this MRI.  All which unsuccessful.  Patient educated on the importance of his MRI and possible alternatives of open MRI.  Patient urged to reach out to neurologist and educated on her appointment coming up in the coming weeks.  Patient expresses understanding.           History of Present Illness     Patient presents the office for follow-up on chronic conditions, elevated blood pressure, no resolution of her headache symptoms.  Patient endorses headaches on a daily basis that are often worse at night.  Patient does not find any relief with any medications including ibuprofen, Tylenol, Imitrex which she states she states on a daily basis.  Patient is following with neurology who is trying to obtain a brain MRI and patient endorses difficulty going into the the MRI scanner due to claustrophobia and anxiety.  Patient has trialed Valium in order to obtain this MRI with little to no success.  Patient urged to reach out to her neurologist  to see coming steps for treatment of her headache and brain imaging.    Patient also endorses high blood pressures at home with her baseline readings being systolic in the 150s to 160s and diastolic being 90 to 100.  Although patient has been making successful lifestyle modifications with diet and attempting to limit her salt and processed foods.  Patient has lost approximately 25 pounds within the last few months.            Review of Systems   Constitutional:  Positive for fatigue. Negative for chills and fever.   HENT:  Negative for rhinorrhea, sinus pressure and sinus pain.    Respiratory:  Negative for chest tightness, shortness of breath and wheezing.    Cardiovascular:  Negative for chest pain.   Gastrointestinal:  Negative for abdominal pain, blood in stool, diarrhea and nausea.   Genitourinary:  Negative for difficulty urinating and dysuria.   Musculoskeletal:  Positive for back pain and neck pain.   Skin:  Negative for rash.   Neurological:  Positive for seizures and headaches. Negative for dizziness, syncope, weakness and light-headedness.   Psychiatric/Behavioral:  Negative for behavioral problems, hallucinations, self-injury and suicidal ideas. The patient is nervous/anxious.      Pertinent Medical History     Medical History Reviewed by provider this encounter:       Past Medical History   Past Medical History:   Diagnosis Date    Depression     DVT (deep venous thrombosis) ([facility])     Migraine     Seizures ([facility])     Suicide attempt ([facility])      Past Surgical History:   Procedure Laterality Date     SECTION      CHOLECYSTECTOMY       Family History   Problem Relation Age of Onset    Seizures Mother     Seizures Brother     Seizures Maternal Aunt      Current Outpatient Medications on File Prior to Visit   Medication Sig Dispense Refill    citalopram (CeleXA) 20 mg tablet TAKE 1 TABLET BY MOUTH EVERY DAY 30 tablet 5    divalproex sodium (DEPAKOTE) 500 mg DR tablet Take 1 tablet (500 mg total) by  mouth 2 (two) times a day 60 tablet 5    SUMAtriptan (IMITREX) 25 mg tablet Take 1 tab at the start of a migraine headache, may repeat 1 tab in 2 hours. 9 tablet 5    topiramate (Topamax) 100 mg tablet Take 1 tablet (100 mg total) by mouth 2 (two) times a day 60 tablet 5    Blood Pressure Monitoring (Adult Blood Pressure Cuff Lg) KIT Use 2 (two) times a day 1 kit 0     No current facility-administered medications on file prior to visit.     Allergies   Allergen Reactions    Other      Bleach    Benadryl [Diphenhydramine] Angioedema    Motrin [Ibuprofen]     Redback Spider (L Sasha), Antivenin Swelling      Current Outpatient Medications on File Prior to Visit   Medication Sig Dispense Refill    citalopram (CeleXA) 20 mg tablet TAKE 1 TABLET BY MOUTH EVERY DAY 30 tablet 5    divalproex sodium (DEPAKOTE) 500 mg DR tablet Take 1 tablet (500 mg total) by mouth 2 (two) times a day 60 tablet 5    SUMAtriptan (IMITREX) 25 mg tablet Take 1 tab at the start of a migraine headache, may repeat 1 tab in 2 hours. 9 tablet 5    topiramate (Topamax) 100 mg tablet Take 1 tablet (100 mg total) by mouth 2 (two) times a day 60 tablet 5    Blood Pressure Monitoring (Adult Blood Pressure Cuff Lg) KIT Use 2 (two) times a day 1 kit 0     No current facility-administered medications on file prior to visit.      Social History     Tobacco Use    Smoking status: Never     Passive exposure: Past    Smokeless tobacco: Never   Vaping Use    Vaping status: Never Used   Substance and Sexual Activity    Alcohol use: Not Currently    Drug use: No    Sexual activity: Not Currently     Objective     BP (!) 160/110 (BP Location: Right arm, Patient Position: Sitting, Cuff Size: Extra-Large)   Pulse 76   Temp (!) 97.3 °F (36.3 °C)   Wt (!) 148 kg (327 lb)   LMP 06/09/2024 (Approximate)   SpO2 96%   BMI 56.13 kg/m²     Physical Exam  Constitutional:       General: She is not in acute distress.     Appearance: She is not ill-appearing or  toxic-appearing.   HENT:      Head: Normocephalic and atraumatic.      Right Ear: External ear normal.      Left Ear: External ear normal.      Nose: No congestion or rhinorrhea.      Mouth/Throat:      Pharynx: No oropharyngeal exudate or posterior oropharyngeal erythema.   Eyes:      Conjunctiva/sclera: Conjunctivae normal.   Cardiovascular:      Rate and Rhythm: Normal rate and regular rhythm.      Pulses: Normal pulses.      Heart sounds: Normal heart sounds. No murmur heard.  Pulmonary:      Effort: Pulmonary effort is normal. No respiratory distress.      Breath sounds: Normal breath sounds. No wheezing or rales.   Abdominal:      General: Bowel sounds are normal. There is no distension.      Palpations: Abdomen is soft.      Tenderness: There is no abdominal tenderness. There is no guarding.   Musculoskeletal:      Right lower leg: No edema.      Left lower leg: No edema.      Comments: Lumbar and thoracic paraspinal hypertonicity. No bony step offs or evidence of trauma. ROM in tact. Strength and sensation in tact bilateral lower extremities.    Skin:     General: Skin is warm.      Capillary Refill: Capillary refill takes less than 2 seconds.   Neurological:      General: No focal deficit present.      Mental Status: She is alert and oriented to person, place, and time. Mental status is at baseline.   Psychiatric:         Mood and Affect: Mood normal.         Behavior: Behavior normal.       Administrative Statements   I have spent a total time of 40 minutes in caring for this patient on the day of the visit/encounter including Diagnostic results, Risks and benefits of tx options, Instructions for management, Importance of tx compliance, Impressions, Documenting in the medical record, Reviewing / ordering tests, medicine, procedures  , Obtaining or reviewing history  , and Communicating with other healthcare professionals .    Reddy Finch,

## 2024-08-12 NOTE — ASSESSMENT & PLAN NOTE
Documented at last ED visit last month. trace edema with no red flags of chest pain or SOB. improving with weight loss per patient. manage HTN as above (dash diet reading provided). FU in two weeks. If patient develops worsening leg swelling with HTN control and dash diet or Cardiac symptoms would order echo cardio gram vs stress echo for further evaluation.

## 2024-08-12 NOTE — ASSESSMENT & PLAN NOTE
Multiple high blood pressure readings today in office 160/110.  Patient states that this is about her baseline at home. Patient has equipment and ability to check BP at home.  Patient has lost over 20 pounds in the last few months with intent due to dietary modifications + medication.  This will likely aid in her blood pressure management if it continues.     Add lisinopril 5mg and fu in 2 weeks. Repeat CMP in 2 weeks to make sure no rise in creatine or drop in gfr due to ace inhibitor

## 2024-08-13 PROBLEM — M54.50 CHRONIC RIGHT-SIDED LOW BACK PAIN WITHOUT SCIATICA: Status: ACTIVE | Noted: 2024-08-13

## 2024-08-13 PROBLEM — G89.29 CHRONIC RIGHT-SIDED LOW BACK PAIN WITHOUT SCIATICA: Status: ACTIVE | Noted: 2024-08-13

## 2024-08-13 NOTE — ASSESSMENT & PLAN NOTE
Headache appears chronic and multifactorial.  Patient has been working with neurology elevated.  She does not understand proper use of medication.  Patient is taking her abortive immitrex once to twice daily with no release of her symptoms.  No change in headache character.  Patient is also taking Advil and Tylenol on a daily basis for very high suspicion for medication induced headache.  Due to her underlying seizure disorder patient has been attempting to obtain brain MRI from her neurologist.  Patient has been given Valium with extensive efforts from her neurologist in order to obtain this MRI.  All which unsuccessful.  Patient educated on the importance of his MRI and possible alternatives of open MRI.  Patient urged to reach out to neurologist and educated on her appointment coming up in the coming weeks.  Patient expresses understanding.

## 2024-08-13 NOTE — ASSESSMENT & PLAN NOTE
Tightness of lumbar and lower thoracic spine likely due to inactivity and obesity. No red flag symptoms or trauma. Will trial comprehensive spine PT for Back pain and exercise, Patient also urged to trial voltaren gel. If not improving can consider OMT or imagining if PT trial is unsuccessful.

## 2024-08-27 LAB — COLOGUARD RESULT REPORTABLE: NORMAL

## 2024-09-09 ENCOUNTER — HOSPITAL ENCOUNTER (OUTPATIENT)
Facility: HOSPITAL | Age: 54
Discharge: HOME | End: 2024-09-09
Payer: COMMERCIAL

## 2024-09-09 DIAGNOSIS — Z12.31 ENCOUNTER FOR SCREENING MAMMOGRAM FOR MALIGNANT NEOPLASM OF BREAST: ICD-10-CM

## 2024-09-09 PROCEDURE — 77063 BREAST TOMOSYNTHESIS BI: CPT

## 2024-09-09 PROCEDURE — 77067 SCR MAMMO BI INCL CAD: CPT

## 2024-09-11 ENCOUNTER — VBI (OUTPATIENT)
Dept: ADMINISTRATIVE | Facility: OTHER | Age: 54
End: 2024-09-11

## 2024-09-11 DIAGNOSIS — I10 PRIMARY HYPERTENSION: ICD-10-CM

## 2024-09-11 RX ORDER — LISINOPRIL 5 MG/1
5 TABLET ORAL DAILY
Qty: 30 TABLET | Refills: 5 | Status: SHIPPED | OUTPATIENT
Start: 2024-09-11

## 2024-09-11 NOTE — TELEPHONE ENCOUNTER
09/11/24 10:40 AM     Chart reviewed for CRC: Colonoscopy ; nothing is submitted to the patient's insurance at this time.     Hannah Galvez   [facility]

## 2024-09-16 ENCOUNTER — OFFICE VISIT (OUTPATIENT)
Dept: FAMILY MEDICINE CLINIC | Facility: CLINIC | Age: 54
End: 2024-09-16

## 2024-09-16 ENCOUNTER — TELEPHONE (OUTPATIENT)
Dept: FAMILY MEDICINE CLINIC | Facility: CLINIC | Age: 54
End: 2024-09-16

## 2024-09-16 ENCOUNTER — TELEPHONE (OUTPATIENT)
Dept: PHYSICAL THERAPY | Facility: OTHER | Age: 54
End: 2024-09-16

## 2024-09-16 VITALS
TEMPERATURE: 97.2 F | SYSTOLIC BLOOD PRESSURE: 138 MMHG | HEIGHT: 64 IN | OXYGEN SATURATION: 98 % | WEIGHT: 293 LBS | BODY MASS INDEX: 50.02 KG/M2 | DIASTOLIC BLOOD PRESSURE: 90 MMHG | HEART RATE: 78 BPM

## 2024-09-16 DIAGNOSIS — I10 PRIMARY HYPERTENSION: Primary | ICD-10-CM

## 2024-09-16 DIAGNOSIS — Z12.11 SCREENING FOR COLON CANCER: ICD-10-CM

## 2024-09-16 NOTE — ASSESSMENT & PLAN NOTE
BP improved since starting Lisinopril 5 mg  Pt reports BP fluctuates at home; does not recall BP readings    Continue current regimen  CMP ordered to monitor Cr and GFR  Advised pt to monitor BP at home and bring readings to next visit     RTC in 4-6 weeks for annual physical exam

## 2024-09-16 NOTE — PROGRESS NOTES
"Ambulatory Visit  Name: Maddie Arteaga      : 1970      MRN: [id number]  Encounter Provider: Dorcas Shabazz MD  Encounter Date: 2024   Encounter department: [facility]    Assessment & Plan  Primary hypertension  BP improved since starting Lisinopril 5 mg  Pt reports BP fluctuates at home; does not recall BP readings    Continue current regimen  CMP ordered to monitor Cr and GFR  Advised pt to monitor BP at home and bring readings to next visit     RTC in 4-6 weeks for annual physical exam     History of Present Illness     Maddie is a 54 year old female with PMH of HTN, MDD presenting to clinic for BP recheck. She reports taking her Lisinopril. She has been measuring her BP at home, however she does not remember readings. She did not bring BP readings today. Denies headache, visual disturbance, SOB, CP.           Review of Systems   Constitutional:  Negative for fatigue.   Eyes:  Negative for visual disturbance.   Respiratory:  Negative for shortness of breath.    Cardiovascular:  Negative for chest pain.   Gastrointestinal:  Negative for abdominal pain.   Neurological:  Negative for dizziness, weakness, light-headedness, numbness and headaches.           Objective     /90 (BP Location: Left arm, Patient Position: Sitting, Cuff Size: Large)   Pulse 78   Temp (!) 97.2 °F (36.2 °C) (Temporal)   Ht 5' 4\" (1.626 m)   Wt (!) 143 kg (316 lb)   LMP 2024 (Approximate)   SpO2 98%   BMI 54.24 kg/m²     Physical Exam  Constitutional:       Appearance: Normal appearance. She is obese.   HENT:      Head: Normocephalic and atraumatic.   Cardiovascular:      Rate and Rhythm: Normal rate and regular rhythm.      Pulses: Normal pulses.      Heart sounds: Normal heart sounds. No murmur heard.  Pulmonary:      Effort: Pulmonary effort is normal. No respiratory distress.      Breath sounds: Normal breath sounds. No wheezing.   Skin:     General: Skin is warm.      Capillary " Refill: Capillary refill takes less than 2 seconds.   Neurological:      Mental Status: She is alert.

## 2024-09-16 NOTE — TELEPHONE ENCOUNTER
Kathy from [facility] called and LM on comp spine VM 4pm 9/16/24    Her message stated her practice entered a referral for this patient in Aug and she was wondering why we never called to schedule    I called back number provided and spoke to Kaya. I explained that the way the doctor entered the referral was a direct referral to PT for an eval with advanced spine. The way the referral was written the patient can call PT and schedule directly. We did not know to call this patient as the referral was not for comp spine triage program. Normally speaking, after triage through our department, our RN will enter the referral that is already present in this Pt's chart.     Pt does not need triage, but can call PT when ready to schedule    No comp spine triage referral to defer or close

## 2024-09-16 NOTE — TELEPHONE ENCOUNTER
Stephany from [facility] returning Kathy's call; she stated the referral that was entered by the PCP was not to the Nurse Triage program, it directed to comprehensive PT which is not Stephany's department which means they did not get the message to contact her. She stated the patient would have to call PT to get that scheduled. If there are any additional questions, Stephany said she can be called.

## 2024-09-16 NOTE — TELEPHONE ENCOUNTER
I called to comprehensive spine physical therapy to find out what is happening with the referral from 8/12 not sure if they were to contact patient. We would like to assist patient with setting up.

## 2024-09-16 NOTE — PATIENT INSTRUCTIONS
Get blood work done before next appointment  Please reschedule your audiology appointment  Please do not miss your appointment for your MRI

## 2024-10-03 DIAGNOSIS — Z00.6 ENCOUNTER FOR EXAMINATION FOR NORMAL COMPARISON OR CONTROL IN CLINICAL RESEARCH PROGRAM: ICD-10-CM

## 2024-10-14 ENCOUNTER — TELEPHONE (OUTPATIENT)
Age: 54
End: 2024-10-14

## 2024-10-16 VITALS — HEIGHT: 64 IN | WEIGHT: 293 LBS | BODY MASS INDEX: 50.02 KG/M2

## 2024-10-16 NOTE — PRE-PROCEDURE INSTRUCTIONS
Pre-Surgery Instructions:   Medication Instructions    citalopram (CeleXA) 20 mg tablet Take day of surgery.    divalproex sodium (DEPAKOTE) 500 mg DR tablet Take day of surgery.    lisinopril (ZESTRIL) 5 mg tablet Hold day of surgery.    SUMAtriptan (IMITREX) 25 mg tablet Uses PRN- DO NOT take day of surgery    topiramate (Topamax) 100 mg tablet Take day of surgery.    Medication instructions for day of procedure reviewed. Please use only a sip of water to take your instructed morning medications (if any).      You will receive a call one business day prior to procedure with an arrival time and hospital directions. If procedure is scheduled on a Monday, the hospital will be calling you on the Friday prior to your procedure. If you have not heard from anyone by 8pm, please call the hospital supervisor through the hospital  at [phone]. (Felipe [phone]).     Please do not eat or drink after 11 pm the night before the MRI. Please take your medications with a sip of water at least 2 hours prior to their arrival time.     Please shower either the night prior to the procedure or the morning of the procedure. Dress in clean, comfortable clothes. All patients will be required to change into a hospital gown. Street clothes are not permitted in the MRI. Magnetic nail polish must be removed prior to the arrival.      Keep any valuables, jewelry, piercings at home.     Please bring your insurance cards, a form of photo ID, physician order, and a list of medications with you. Arrive 15 minutes prior to your given arrival time in order to register. Please bring any prior CT or MRI studies of the same area that were not performed at a [facility] along.      Arrange for a responsible person to drive patient to and from the hospital on the day of the procedure. Visitor Guidelines discussed.     Call the prescribing physician's office with any new illnesses, exposures, or additional questions prior to  WDL procedure.

## 2024-10-31 ENCOUNTER — APPOINTMENT (OUTPATIENT)
Dept: LAB | Facility: HOSPITAL | Age: 54
End: 2024-10-31
Payer: COMMERCIAL

## 2024-10-31 DIAGNOSIS — G40.909 EPILEPSY (HCC): ICD-10-CM

## 2024-10-31 DIAGNOSIS — I10 PRIMARY HYPERTENSION: ICD-10-CM

## 2024-10-31 LAB
ALBUMIN SERPL BCG-MCNC: 3.8 G/DL (ref 3.5–5)
ALP SERPL-CCNC: 55 U/L (ref 34–104)
ALT SERPL W P-5'-P-CCNC: 7 U/L (ref 7–52)
ANION GAP SERPL CALCULATED.3IONS-SCNC: 5 MMOL/L (ref 4–13)
AST SERPL W P-5'-P-CCNC: 12 U/L (ref 13–39)
BILIRUB SERPL-MCNC: 0.4 MG/DL (ref 0.2–1)
BUN SERPL-MCNC: 15 MG/DL (ref 5–25)
CALCIUM SERPL-MCNC: 8.8 MG/DL (ref 8.4–10.2)
CHLORIDE SERPL-SCNC: 108 MMOL/L (ref 96–108)
CO2 SERPL-SCNC: 26 MMOL/L (ref 21–32)
CREAT SERPL-MCNC: 0.68 MG/DL (ref 0.6–1.3)
GFR SERPL CREATININE-BSD FRML MDRD: 100 ML/MIN/1.73SQ M
GLUCOSE P FAST SERPL-MCNC: 91 MG/DL (ref 65–99)
POTASSIUM SERPL-SCNC: 4.2 MMOL/L (ref 3.5–5.3)
PROT SERPL-MCNC: 6.9 G/DL (ref 6.4–8.4)
SODIUM SERPL-SCNC: 139 MMOL/L (ref 135–147)

## 2024-10-31 PROCEDURE — 80201 ASSAY OF TOPIRAMATE: CPT

## 2024-10-31 PROCEDURE — 80053 COMPREHEN METABOLIC PANEL: CPT

## 2024-10-31 PROCEDURE — 36415 COLL VENOUS BLD VENIPUNCTURE: CPT

## 2024-10-31 PROCEDURE — 80165 DIPROPYLACETIC ACID FREE: CPT

## 2024-11-04 LAB — VALPROATE FREE SERPL-MCNC: 3.5 UG/ML (ref 6–22)

## 2024-11-05 LAB — TOPIRAMATE SERPL-MCNC: 4.5 UG/ML (ref 2–25)

## 2024-11-15 ENCOUNTER — DOCUMENTATION (OUTPATIENT)
Dept: NEUROLOGY | Facility: CLINIC | Age: 54
End: 2024-11-15

## 2024-11-15 NOTE — PROGRESS NOTES
Augustin is requesting a peer to peer for patient's MRI brain and cervical     Please call [phone] with tracking number [id number]     I called KATHERINE for peer to peer regarding Reference number [id number]  MRI brain w/wo contrast and  MRI cervical spine w/o contrast   Regarding seizures, worsening headaches, and hyperreflexia.    Spoke with Dr. Wheeler - denial was due to previously approved study and lack of follow-up notes.  Required further office notes.    No further follow-up with the patient as we were waiting the results of these studies.  Reviewed that the patient had scheduled a previously approved MRI brain and cervical spine on 5/20/2024 but she was not able to lie flat, so the order had to be changed to include general anesthesia.  This study was not scheduled until 11/20/2024, probably due to availability.  No other ED visit for seizures or headaches.  No prior MRI brain study from the past is available.    He approved the two studies:  MRI brain - [id number]  MRI cervical spine [id number]  Both are authorized to 1/12/2025

## 2024-11-19 ENCOUNTER — TELEPHONE (OUTPATIENT)
Age: 54
End: 2024-11-19

## 2024-11-19 NOTE — TELEPHONE ENCOUNTER
11/19/24    Patient daughter called office requesting to Reschedule MRI Appt 11/20/24.    Provided Central Scheduling Number [id number].    Patient Daughter Made a Note of it and Expressed her Thanks.       Any questions, please contact Patient.  Thank You.

## 2024-11-20 ENCOUNTER — HOSPITAL ENCOUNTER (OUTPATIENT)
Dept: RADIOLOGY | Facility: HOSPITAL | Age: 54
Discharge: HOME | End: 2024-11-20
Attending: PSYCHIATRY & NEUROLOGY

## 2024-11-21 ENCOUNTER — NURSE TRIAGE (OUTPATIENT)
Age: 54
End: 2024-11-21

## 2024-11-21 ENCOUNTER — OFFICE VISIT (OUTPATIENT)
Dept: FAMILY MEDICINE CLINIC | Facility: CLINIC | Age: 54
End: 2024-11-21
Payer: COMMERCIAL

## 2024-11-21 VITALS
BODY MASS INDEX: 54.48 KG/M2 | HEART RATE: 76 BPM | WEIGHT: 293 LBS | SYSTOLIC BLOOD PRESSURE: 144 MMHG | OXYGEN SATURATION: 95 % | TEMPERATURE: 100.6 F | DIASTOLIC BLOOD PRESSURE: 100 MMHG

## 2024-11-21 DIAGNOSIS — G43.011 INTRACTABLE MIGRAINE WITHOUT AURA AND WITH STATUS MIGRAINOSUS: ICD-10-CM

## 2024-11-21 DIAGNOSIS — R51.9 ACUTE INTRACTABLE HEADACHE, UNSPECIFIED HEADACHE TYPE: ICD-10-CM

## 2024-11-21 DIAGNOSIS — H92.03 OTALGIA OF BOTH EARS: ICD-10-CM

## 2024-11-21 DIAGNOSIS — I10 HYPERTENSION, UNSPECIFIED TYPE: Primary | ICD-10-CM

## 2024-11-21 PROCEDURE — 99213 OFFICE O/P EST LOW 20 MIN: CPT

## 2024-11-21 RX ORDER — PROPRANOLOL HYDROCHLORIDE 80 MG/1
80 CAPSULE, EXTENDED RELEASE ORAL DAILY
Qty: 30 CAPSULE | Refills: 1 | Status: SHIPPED | OUTPATIENT
Start: 2024-11-21

## 2024-11-21 NOTE — TELEPHONE ENCOUNTER
"Patient called in to set OV. MA notified me to triage for BP of 183/102. Spoke with patient's daughter and /115 HR 76 and 138/90 HR 76 while on phone 5 minutes apart. Patient is complaining of HA (history of). Daughter says her face is more puffy today, breathing is different but, not labored and she has nausea with the headache. Patient takes Sumatriptan for history of Migraines which hasn't been working well lately. Patient also reporting constipation for 3 days.  Patient took 1 dose of Sumatriptan this am and had all her morning medication including Lisinopril at 8 am. OV today 2:40 pm    Reason for Disposition   Systolic BP >= 180 OR Diastolic >= 110    Answer Assessment - Initial Assessment Questions  1. BLOOD PRESSURE: \"What is your blood pressure?\" \"Did you take at least two measurements 5 minutes apart?\"      148/115 HR 76, 138/90 HR 76  2. ONSET: \"When did you take your blood pressure?\"      Today 1:32 pm, 1:38 pm  3. HOW: \"How did you take your blood pressure?\" (e.g., automatic home BP monitor, visiting nurse)      Home cuff  4. HISTORY: \"Do you have a history of high blood pressure?\"      yes  5. MEDICINES: \"Are you taking any medicines for blood pressure?\" \"Have you missed any doses recently?\"      Lisinopril  6. OTHER SYMPTOMS: \"Do you have any symptoms?\" (e.g., blurred vision, chest pain, difficulty breathing, headache, weakness)      HA, nausea, \"breathing different\", Not SOB, face looks puffy-starting today  7. PREGNANCY: \"Is there any chance you are pregnant?\" \"When was your last menstrual period?\"      One month ago    Protocols used: Blood Pressure - High-Adult-OH    "

## 2024-11-21 NOTE — ASSESSMENT & PLAN NOTE
Patient has history of migraines, takes sumatriptan as a rescue medication but she reports that it has not been effective over the last few days.  She also reports worsening headaches over the span of several months.  She has been referred to neurology in the past, but has either missed or canceled multiple appointments.  Does have an MRI brain scheduled several months from now.  No upcoming neurology appointment noted.    Plan  Will add propranolol long-acting 80 mg daily for improved control  Strongly urged to call neurology for follow-up appointment given worsening symptoms.  She and her daughter voiced understanding and intent to do so.    Orders:    propranolol (INDERAL LA) 80 mg 24 hr capsule; Take 1 capsule (80 mg total) by mouth daily

## 2024-11-21 NOTE — PROGRESS NOTES
Name: Maddie Arteaga      : 1970      MRN: [id number]  Encounter Provider: Jorge Guajardo DO  Encounter Date: 2024   Encounter department: [facility]  :  Assessment & Plan  Hypertension, unspecified type  Currently on lisinopril 5 mg daily.  She reports blood pressure readings in the 180s/100s earlier in the day, improved to 144/100 in office today.  She is currently slightly sick and has had worsening headaches recently, which may be contributory to the high blood pressure.  Consider headaches less likely a symptom of hypertension given that they have been occurring when she is normotensive as well.  No vision changes, chest pain, shortness of breath.    Plan  Continue lisinopril 5 mg, propranolol has been added for treatment of migraines may have benefit of blood pressure reduction as well       Intractable migraine without aura and with status migrainosus  Patient has history of migraines, takes sumatriptan as a rescue medication but she reports that it has not been effective over the last few days.  She also reports worsening headaches over the span of several months.  She has been referred to neurology in the past, but has either missed or canceled multiple appointments.  Does have an MRI brain scheduled several months from now.  No upcoming neurology appointment noted.    Plan  Will add propranolol long-acting 80 mg daily for improved control  Strongly urged to call neurology for follow-up appointment given worsening symptoms.  She and her daughter voiced understanding and intent to do so.    Orders:    propranolol (INDERAL LA) 80 mg 24 hr capsule; Take 1 capsule (80 mg total) by mouth daily    Acute intractable headache, unspecified headache type    Orders:    propranolol (INDERAL LA) 80 mg 24 hr capsule; Take 1 capsule (80 mg total) by mouth daily    Otalgia of both ears  Patient notes frequent bilateral ear pain, does follow with ENT.  Otoscope exam unremarkable bilaterally  in office today.  Encouraged to follow-up with ENT for concerns.              History of Present Illness     Pt is a 55 yo female with PMH of seizure disorder and chronic headaches complaining of 4-5 month history of headaches not alleviated by her rescue medication (Sumatriptan). Headaches are associated with lightheadedness and nausea, have no prodrome; light is an aggravating factor; worse during the day than at night. She has been taking up to 2 doses of sumatripan almost every day for the past 4-5 months, without relief. She is also complaining of ear pain and decreased hearing for the past couple months. She used organic OTC ear drops at first, without relief so she stopped. Pt also endorses feeling weakness in her LE's and unsteadiness at times. No vision changes. Has recurrent trouble falling/staying asleep. Tried melatonin in the past. Previous diagnosis of sleep apnea, did not tolerate CPAP.    In the past couple days, pt endorses increased drowsiness and decreased appetite. Pt's son was sick with URI 1.5 wks ago, no other sick contacts or recent travel.     - no prodrome; light is an aggravating factor; worse during the day  - MRI scheduled for March 2025 for seizures and headaches  - overdue for hearing check  - no recent travel; son was sick 1.5 wks ago  - drinks sufficient water; no change in thirst  - drinks 1-2 coffee in the morning  - has trouble falling/staying asleep; used to take melatonin; diagnosed with sleep apnea, did not tolerate CPAP  - disrupted sleep; doesn't sleep much            Review of Systems   Constitutional:  Positive for fatigue and fever. Negative for chills.   HENT:  Positive for congestion. Negative for tinnitus.    Eyes:  Positive for photophobia (w/ migraines).   Respiratory:  Negative for chest tightness and shortness of breath.    Cardiovascular:  Negative for chest pain.   Gastrointestinal:  Negative for abdominal pain, nausea and vomiting.   Neurological:  Positive for  headaches.        Poor balance     Medical History Reviewed by provider this encounter:     .     Objective   /100 (BP Location: Right arm, Patient Position: Sitting, Cuff Size: Extra-Large)   Pulse 76   Temp (!) 100.6 °F (38.1 °C)   Wt (!) 144 kg (317 lb 6.4 oz)   LMP 06/09/2024 (Approximate)   SpO2 95%   BMI 54.48 kg/m²      Physical Exam  Constitutional:       General: She is not in acute distress.     Appearance: She is obese. She is not ill-appearing or toxic-appearing.   HENT:      Head: Normocephalic and atraumatic.      Right Ear: Tympanic membrane and external ear normal.      Left Ear: Tympanic membrane and external ear normal.      Ears:      Comments: Mild erythema L canal, no purulence or swelling     Mouth/Throat:      Pharynx: Oropharynx is clear.   Eyes:      Conjunctiva/sclera: Conjunctivae normal.      Pupils: Pupils are equal, round, and reactive to light.   Cardiovascular:      Rate and Rhythm: Normal rate and regular rhythm.      Heart sounds: Normal heart sounds.   Pulmonary:      Effort: Pulmonary effort is normal.   Abdominal:      Palpations: Abdomen is soft.   Skin:     General: Skin is warm and dry.   Neurological:      Mental Status: She is alert. Mental status is at baseline.

## 2024-11-22 ENCOUNTER — HOSPITAL ENCOUNTER (EMERGENCY)
Facility: HOSPITAL | Age: 54
Discharge: LEFT BEFORE DISCHARGE | End: 2024-11-22
Attending: EMERGENCY MEDICINE
Payer: COMMERCIAL

## 2024-11-22 VITALS
TEMPERATURE: 98.4 F | RESPIRATION RATE: 18 BRPM | DIASTOLIC BLOOD PRESSURE: 98 MMHG | OXYGEN SATURATION: 95 % | SYSTOLIC BLOOD PRESSURE: 161 MMHG | HEART RATE: 79 BPM

## 2024-11-22 PROCEDURE — 93005 ELECTROCARDIOGRAM TRACING: CPT

## 2024-11-22 PROCEDURE — NC001 PR NO CHARGE: Performed by: EMERGENCY MEDICINE

## 2024-11-22 NOTE — ED PROVIDER NOTES
"I signed up for patient when she was marked as \"ready to be seen by provider\".  Patient left without being seen prior to provider evaluation.  I reviewed vital signs which showed no significant abnormality.  I did not see, examine or otherwise evaluate patient given the fact that she left without being seen prior to provider evaluation.     Sebastian Rivera MD  11/22/24 [id number]    "

## 2024-11-25 LAB
ATRIAL RATE: 71 BPM
P AXIS: 31 DEGREES
PR INTERVAL: 160 MS
QRS AXIS: 14 DEGREES
QRSD INTERVAL: 76 MS
QT INTERVAL: 424 MS
QTC INTERVAL: 460 MS
T WAVE AXIS: 24 DEGREES
VENTRICULAR RATE: 71 BPM

## 2024-11-25 PROCEDURE — 93010 ELECTROCARDIOGRAM REPORT: CPT | Performed by: INTERNAL MEDICINE

## 2024-11-26 ENCOUNTER — PREP FOR PROCEDURE (OUTPATIENT)
Age: 54
End: 2024-11-26

## 2024-11-26 ENCOUNTER — TELEPHONE (OUTPATIENT)
Age: 54
End: 2024-11-26

## 2024-11-26 DIAGNOSIS — G40.909 EPILEPSY (HCC): ICD-10-CM

## 2024-11-26 DIAGNOSIS — Z12.11 SCREENING FOR COLON CANCER: Primary | ICD-10-CM

## 2024-11-26 DIAGNOSIS — G43.011 INTRACTABLE MIGRAINE WITHOUT AURA AND WITH STATUS MIGRAINOSUS: ICD-10-CM

## 2024-11-26 NOTE — TELEPHONE ENCOUNTER
11/26/24  Screened by: Kayla Juan MA    Referring Provider MAURICE GUADALUPE     Pre- Screening:     There is no height or weight on file to calculate BMI.  Has patient been referred for a routine screening Colonoscopy? yes  Is the patient between 45-75 years old? yes      Previous Colonoscopy no   If yes:    Date:     Facility:     Reason:       Does the patient want to see a Gastroenterologist prior to their procedure OR are they having any GI symptoms? no    Has the patient been hospitalized or had abdominal surgery in the past 6 months? no    Does the patient use supplemental oxygen? no    Does the patient take Coumadin, Lovenox, Plavix, Elliquis, Xarelto, or other blood thinning medication? no    Has the patient had a stroke, cardiac event, or stent placed in the past year? no      If patient is between 45yrs - 49yrs, please advise patient that we will have to confirm benefits & coverage with their insurance company for a routine screening colonoscopy.    PT'S DAUGHTER ZAIRA GÓMEZ ANSWERED QUESTIONS TO THE BEST OF HER KNOWLEDGE.

## 2024-11-26 NOTE — TELEPHONE ENCOUNTER
Scheduled date of colonoscopy (as of today): 1/14/25  Physician performing colonoscopy: Dr. Ray  Location of colonoscopy: [facility]  Bowel prep reviewed with patient: Johnny  Instructions reviewed with patient by: Kayla JEROME, sent via mychart letter, pt's daughter Gema garcía  Clearances: N/A

## 2024-11-26 NOTE — LETTER
November 26, 2024      Dear Rosana Perkins please find prep instructions for your scheduled colonoscopy on 1/14/25 at [facility], [address] [place] [address], [id number].    Please contact us at [phone] with any questions.         Sincerely,        [facility]                                                                                                               Medicine Instructions for Adults with Diabetes who Need a Bowel Prep       Follow these instructions when a BOWEL PREP is required for your procedure or surgery!    NOTE:   GLP-1 Agonists taken weekly: do not take in the 7 days before your procedure   SGLT-2 Inhibitors: do not take in the 4 days before your procedure     On the Day Before Surgery/Procedure  If you are having a procedure (e.g. Colonoscopy) or surgery that requires a bowel prep and you may have at least a clear liquid diet, follow the directions below based on the type of medicine you take for your diabetes.     Type of Medicine You Take Examples What to do   Pre-Mixed Insulin - Intermediate Acting Humalog® 75/25, Humulin® 70/30, Novolog® 70/30, Regular Insulin Take ½ your regular dose the evening before your procedure   Rapid/Fast Acting Insulin Humalog® U200, NovoLog®, Apidra®, Fiasp® Take ½ your regular dose the evening before your procedure.   Long-Acting Insulin Lantus®, Levemir®, Tresiba®, Toujeo®, Basaglar® Take your FULL regular dose the day before procedure   Oral Sulfonylurea Glipizide/Glimepiride/Glucotrol® Take ½ your regular dose the evening before your procedure   Other Oral Diabetes Medicines Metformin®, Glucophage®, Glucophage XR®, Riomet®, Glumetza®), Actose®, Avandia®, Glyset®, Prandin® Take your regular dose with dinner in the evening before your procedure   GLP-1 Agonists AdlyxinÒ, ByettaÒ, BydureonÒ, OzempicÒ, SoliquaÒ, TanzeumÒ, TrulicityÒ, VictozaÒ, Saxenda®, Rybelsus® If taken daily, take as  normal    If taken weekly, do not take this medicine for 7 days before your procedure including the day of the procedure (resume taking after the procedure)   SGLT-2 Inhibitors Jardiance®, Invokana®, Farxiga®,   Steglatro®, Brenzavvy®, Qtern®, Segluromet®, Glyxambi®, Synjardy®, Synjardy XR®, Invokamet®, Invokamet XR®, Trijary XR®, Xigduo XR®, Steglujan® Do not take for 4 days before your procedure including the day of the procedure (resume taking after the procedure)                More information continued on back                    Medicine Instructions for Adults with Diabetes who Need a Bowel Prep  Page 2      On the Day of Surgery/Procedure  Follow the directions below based on the type of medicine you take for your diabetes.     Type of Medicine You Take Examples What to do   Long-Acting Insulin Lantus®, Levemir®, Tresiba®, Toujeo®, Basaglar®, Semglee®   If you usually take your Long-Acting Insulin in the morning, take the full dose as scheduled.   GLP-1 Agonists AdlyxinÒ, ByettaÒ, BydureonÒ, OzempicÒ, SoliquaÒ, TanzeumÒ, TrulicityÒ, VictozaÒ, Saxenda®, Rybelsus® Do NOT take this medicine on the day of your procedure (resume taking after the procedure)       On the Day of Surgery/Procedure (continued)  Except for the morning Long-Acting Insulin, DO NOT take ANY diabetic medicine on the day of your procedure unless you were instructed by the doctor who manages your diabetes medicines.    Continue to check your blood sugars.  If you have an insulin pump, ask your endocrinologist for instructions at least 3 days before your procedure. NOTE: If you are not able to ask your endocrinologist in advance, on the day of the procedure set your insulin pump to your basal rate only. Bring your insulin pump supplies to the hospital.     If you have any questions about taking your diabetes medicines prior to your procedure, please contact the doctor who manages your diabetes medicines.

## 2024-12-01 RX ORDER — TOPIRAMATE 100 MG/1
100 TABLET, FILM COATED ORAL 2 TIMES DAILY
Qty: 60 TABLET | Refills: 5 | OUTPATIENT
Start: 2024-12-01

## 2024-12-01 RX ORDER — DIVALPROEX SODIUM 500 MG/1
500 TABLET, DELAYED RELEASE ORAL 2 TIMES DAILY
Qty: 60 TABLET | Refills: 5 | OUTPATIENT
Start: 2024-12-01

## 2025-01-09 ENCOUNTER — TELEPHONE (OUTPATIENT)
Age: 55
End: 2025-01-09

## 2025-01-09 NOTE — TELEPHONE ENCOUNTER
Contacted patient off of [facility]  to verify needs of services in attempts to offer patient an appointment. Unable to lvm as received message number is not in service.     Canby MA verified via [facility], ID:[id number]    1st attempt

## 2025-01-14 NOTE — TELEPHONE ENCOUNTER
Contacted patient off of [facility]  to verify needs of services in attempts to offer patient an available appointment. LVM for patient to contact intake dept  in regards to wait list.     Ant MESA verified via Promise, ID:[id number]     2nd attempt, pt removed from [facility]

## 2025-01-17 DIAGNOSIS — G43.011 INTRACTABLE MIGRAINE WITHOUT AURA AND WITH STATUS MIGRAINOSUS: ICD-10-CM

## 2025-01-17 DIAGNOSIS — R51.9 ACUTE INTRACTABLE HEADACHE, UNSPECIFIED HEADACHE TYPE: ICD-10-CM

## 2025-01-17 DIAGNOSIS — G40.909 EPILEPSY (HCC): ICD-10-CM

## 2025-01-17 DIAGNOSIS — F33.2 SEVERE EPISODE OF RECURRENT MAJOR DEPRESSIVE DISORDER, WITHOUT PSYCHOTIC FEATURES (HCC): Chronic | ICD-10-CM

## 2025-01-17 RX ORDER — PROPRANOLOL HYDROCHLORIDE 80 MG/1
80 CAPSULE, EXTENDED RELEASE ORAL DAILY
Qty: 30 CAPSULE | Refills: 1 | Status: SHIPPED | OUTPATIENT
Start: 2025-01-17

## 2025-01-17 RX ORDER — CITALOPRAM HYDROBROMIDE 20 MG/1
20 TABLET ORAL DAILY
Qty: 30 TABLET | Refills: 5 | Status: SHIPPED | OUTPATIENT
Start: 2025-01-17

## 2025-01-20 RX ORDER — DIVALPROEX SODIUM 500 MG/1
500 TABLET, DELAYED RELEASE ORAL 2 TIMES DAILY
Qty: 60 TABLET | Refills: 5 | OUTPATIENT
Start: 2025-01-20

## 2025-01-20 RX ORDER — TOPIRAMATE 100 MG/1
100 TABLET, FILM COATED ORAL 2 TIMES DAILY
Qty: 60 TABLET | Refills: 0 | Status: SHIPPED | OUTPATIENT
Start: 2025-01-20

## 2025-01-20 NOTE — TELEPHONE ENCOUNTER
Please clarify how is she really taking her Divalproex.such as once a day, once every couple of days?  Is she still taking topiramate 100mg twice a day?  Has there been recurrent seizures?    This is the reason for her to keep follow-up appointments, so that we know if these medications have been working or not.

## 2025-01-20 NOTE — TELEPHONE ENCOUNTER
Patient needs an appointment. Please contact the patient to schedule an appointment. Last office visit: 4/2024    Pt was to follow up in 3 months

## 2025-01-20 NOTE — TELEPHONE ENCOUNTER
Patients daughter called the RX Refill Line. Message is being forwarded to the office.     Patients daughter is requesting a follow up on the following medication:    divalproex sodium (DEPAKOTE) 500 mg DR tablet     She was informed the patient is overdue for an appointment as well as needing labs completed CBC. Please places orders, call was transferred to scheduling office for overdue appointment. Please review, as a has been without medication for a few days.     Please contact patient at

## 2025-01-20 NOTE — TELEPHONE ENCOUNTER
Phone call to patient regarding refill request. Patient gave verbal permission for me to speak with patient's daughter Gema. Gema stated patient has not had Depakote in over a week. Patient last filled her Depakote prescription on 10-30-24. When I asked how patient still had medication to take in January, Gema stated patient forgets to take her medication sometimes. Advised if patient took Depakote twice a day as directed, she would have run out on 11-30-24.     LUKE Moise 4-22-24  NOV Dr. Moise 3-17-25     # [phone]      Dr. Moise, please advise. Thank you!

## 2025-01-20 NOTE — TELEPHONE ENCOUNTER
Patient no showed and cancelled 3 follow-up visit since first evaluation; she also failed to complete MRI imaging study and EEG study.

## 2025-01-22 NOTE — TELEPHONE ENCOUNTER
Case Moise MD      Please clarify how is she really taking her Divalproex.such as once a day, once every couple of days?   Is she still taking topiramate 100mg twice a day?  Has there been recurrent seizures?     This is the reason for her to keep follow-up appointments, so that we know if these medications have been working or not.      ______________________    Daughter Gema called back to provider update per provider's questions below  (she is on communication consent) :    Divalproex: pt last script was filled in Oct and last her until now (month supply) as dtr informed pt forgets to take pills at times, so she is not aware how she is taking this particular med at this time and did not inform when her last dose was, as she does take her other medication as she needs to as scheduled .    Topiramate: Pt does take 1 tab twice daily per daughter.    Seizure: She has not had a seizure in at least 6 months, unsure of exact date    Daughter understood about the importance of keeping appts per provider's note above.    Pt is medhat for next appt on 2/11/25.    Gema would like to know if provider is agreeable to send Divalproex to pharmacy for refill, or does provider prefer to see pt at appt first on 2/11/25 and then decide at that time?     Routed to provider to advise as Gema would like a call back at  , may leave a detailed msg.

## 2025-01-22 NOTE — TELEPHONE ENCOUNTER
"Phone call to patient's daughter Gema regarding Dr. Moise's message below.     [phone] \"Not in service\"    Phone call to patient [phone] Left message on voicemail to call back.       "

## 2025-01-22 NOTE — TELEPHONE ENCOUNTER
If she has not been consistently taking Divalproex for the past month, so less than five times a week, then we might as well just discontinue divalproex.  The plan was to eventually wean off of Divalproex and keep her on topiramate instead.  If there has been no seizure in the last few months, then we can discontinue divalproex.

## 2025-01-23 NOTE — TELEPHONE ENCOUNTER
Pt's daughter Gema calling back. Advised her of Dr. Moise's response and recommendation to address medications and seizures at follow up visit. Daughter verbalizes understanding. No further questions at this time.

## 2025-01-23 NOTE — TELEPHONE ENCOUNTER
Called pt's daughter and left a general  with a call back #. Awaiting return call.    Called to make aware and spoke with pt. As this writer was reviewing Dr. Moise's note with the pt, she stated that she has had seizures in the last 6 months. She unsure of the exact date, but stated that probably about 2-3 months ago she had back-to-back seizures. They were unwitnessed and she has no recollection of them or if they were her typical type of seizures.    DALILA

## 2025-02-05 ENCOUNTER — TELEPHONE (OUTPATIENT)
Dept: NEUROLOGY | Facility: CLINIC | Age: 55
End: 2025-02-05

## 2025-02-05 NOTE — TELEPHONE ENCOUNTER
made a call for appt reminder with Dr. Moise 2/11/25 @10:30am  and also mentioned [place] office address & phone number as well.  informed arrive 10 to 15 mins early for the appt.

## 2025-02-11 ENCOUNTER — OFFICE VISIT (OUTPATIENT)
Dept: NEUROLOGY | Facility: CLINIC | Age: 55
End: 2025-02-11
Payer: COMMERCIAL

## 2025-02-11 VITALS
SYSTOLIC BLOOD PRESSURE: 156 MMHG | HEIGHT: 64 IN | HEART RATE: 94 BPM | TEMPERATURE: 97.3 F | BODY MASS INDEX: 50.02 KG/M2 | RESPIRATION RATE: 16 BRPM | DIASTOLIC BLOOD PRESSURE: 100 MMHG | WEIGHT: 293 LBS

## 2025-02-11 DIAGNOSIS — R51.9 CHRONIC DAILY HEADACHE: ICD-10-CM

## 2025-02-11 DIAGNOSIS — G40.909 NONINTRACTABLE EPILEPSY WITHOUT STATUS EPILEPTICUS, UNSPECIFIED EPILEPSY TYPE (HCC): Primary | ICD-10-CM

## 2025-02-11 DIAGNOSIS — G44.40 MEDICATION OVERUSE HEADACHE: ICD-10-CM

## 2025-02-11 DIAGNOSIS — G47.33 OBSTRUCTIVE SLEEP APNEA SYNDROME IN ADULT: ICD-10-CM

## 2025-02-11 DIAGNOSIS — G43.011 INTRACTABLE MIGRAINE WITHOUT AURA AND WITH STATUS MIGRAINOSUS: ICD-10-CM

## 2025-02-11 PROCEDURE — 99215 OFFICE O/P EST HI 40 MIN: CPT | Performed by: PSYCHIATRY & NEUROLOGY

## 2025-02-11 PROCEDURE — 99417 PROLNG OP E/M EACH 15 MIN: CPT | Performed by: PSYCHIATRY & NEUROLOGY

## 2025-02-11 RX ORDER — SUMATRIPTAN 50 MG/1
TABLET, FILM COATED ORAL
Qty: 9 TABLET | Refills: 5 | Status: SHIPPED | OUTPATIENT
Start: 2025-02-11

## 2025-02-11 RX ORDER — ONDANSETRON 4 MG/1
TABLET, ORALLY DISINTEGRATING ORAL
COMMUNITY
Start: 2025-01-27

## 2025-02-11 RX ORDER — TOPIRAMATE 100 MG/1
TABLET, FILM COATED ORAL
Qty: 75 TABLET | Refills: 5 | Status: SHIPPED | OUTPATIENT
Start: 2025-02-11

## 2025-02-11 RX ORDER — OLANZAPINE 5 MG/1
5 TABLET ORAL
Qty: 5 TABLET | Refills: 0 | Status: SHIPPED | OUTPATIENT
Start: 2025-02-11 | End: 2025-02-21

## 2025-02-11 RX ORDER — DEXAMETHASONE 4 MG/1
4 TABLET ORAL
Qty: 5 TABLET | Refills: 0 | Status: SHIPPED | OUTPATIENT
Start: 2025-02-11 | End: 2025-02-16

## 2025-02-11 NOTE — PATIENT INSTRUCTIONS
Recurrent seizures; possible generalized genetic epilepsy versus psychogenic nonepileptic seizures.  There is inconsistency in the seizure semiology.  Please try to video record the seizure if it happens again  - MRI brain w/wo contrast with general anesthesia  - sleep deprived EEG study  - Increase topiramate 100mg tab to one tab in the morning and one and a half tab at bedtime  - check topiramate level and BMP    Intractable migraines and migraines  - recommend an evaluation by sleep medicine about obstructive sleep apnea as a cause of your chronic daily headaches  - you must stop taking Tylenol and ibuprofen (this is causing medication overuse headache)  - Re-trial of Dexamethasone (Decadron) 4mg one tab with breakfast for 5 days only and olanzapine 2.5mg at bedtime for 5 day  - you may use Sumatriptan (Imitrex) 50mg tab for severe headache take one tab as needed  - continue with propranolol LA 80mg daily   - recommend referral to Weight Management for evaluation for weight loss program to help with KATHERINE and back pain.    Hyperreflexia, rule out cervical cord myelopathy  - MRI of cervical spine
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
## 2025-02-16 NOTE — ASSESSMENT & PLAN NOTE
There is inconsistency in the seizure semiology.  Please try to video record the seizure if it happens again  - MRI brain w/wo contrast with general anesthesia  - sleep deprived EEG study  - Increase topiramate 100mg tab to one tab in the morning and one and a half tab at bedtime  - check topiramate level and BMP

## 2025-02-16 NOTE — ASSESSMENT & PLAN NOTE
- you may use Sumatriptan (Imitrex) 50mg tab for severe headache take one tab as needed  - continue with propranolol LA 80mg daily   - topiramate is also a migraine preventative medication

## 2025-02-16 NOTE — ASSESSMENT & PLAN NOTE
- recommend an evaluation by sleep medicine about obstructive sleep apnea as a cause of your chronic daily headaches

## 2025-02-16 NOTE — ASSESSMENT & PLAN NOTE
- you must stop taking Tylenol and ibuprofen (this is causing medication overuse headache)  - Re-trial of Dexamethasone (Decadron) 4mg one tab with breakfast for 5 days only and olanzapine 2.5mg at bedtime for 5 day

## 2025-02-18 ENCOUNTER — TELEPHONE (OUTPATIENT)
Dept: NEUROLOGY | Facility: CLINIC | Age: 55
End: 2025-02-18

## 2025-02-18 ENCOUNTER — CONSULT (OUTPATIENT)
Dept: FAMILY MEDICINE CLINIC | Facility: CLINIC | Age: 55
End: 2025-02-18
Payer: COMMERCIAL

## 2025-02-18 VITALS
DIASTOLIC BLOOD PRESSURE: 84 MMHG | HEART RATE: 79 BPM | SYSTOLIC BLOOD PRESSURE: 129 MMHG | TEMPERATURE: 98.7 F | HEIGHT: 62 IN | OXYGEN SATURATION: 94 % | BODY MASS INDEX: 53.92 KG/M2 | WEIGHT: 293 LBS

## 2025-02-18 DIAGNOSIS — Z01.818 PREOPERATIVE CLEARANCE: Primary | ICD-10-CM

## 2025-02-18 PROCEDURE — 99214 OFFICE O/P EST MOD 30 MIN: CPT

## 2025-02-18 NOTE — PROGRESS NOTES
Pre-operative Clearance  Name: Maddie Arteaga      : 1970      MRN: [id number]  Encounter Provider: Niraj Barone MD MPH  Encounter Date: 2025   Encounter department: [facility]    Assessment & Plan  Preoperative clearance  Maddie Arteaga is a 53 yo female with PMH of seizure disorder, chronic headaches, hypertension and obstructive sleep Apnea, currently doesn't used CPAP. Patient has not concerns at this time.     Discuss with patient, it's recommended to continue to take all her medication.         Pre-operative Clearance:     Clearance:       Based on assessment, patient is medically optimized for MRI with general anesthesia.     Medication Instructions:   - May take tylenol for pain up until the night before surgery    - ACE Inhibitors or ARBs: Continue this medication up to the evening before surgery/procedure, but do not take the morning of the day of surgery.  - 5HT3 Antagonist (ie, zofran):  Continue to take this medication on your normal schedule.   - Antidepressants: Continue to take this medication on your normal schedule.  - Antiepileptic meds: Continue to take this medication on your normal schedule.  - Beta blockers:  Continue to take this medication on your normal schedule.       History of Present Illness     daija Arteaga is a 53 yo female with PMH of seizure disorder, chronic headaches, hypertension and obstructive sleep Apnea, currently doesn't used CPAP. Patient has not concerns at this time. She is schedule to have MRI with anesthesia on the 3/05/2025. She does endorse some anxiety about getting the test done.       Pre-op Exam    Maddie Arteaga is a 53 yo female with PMH of seizure disorder, chronic headaches, hypertension and obstructive sleep Apnea, currently doesn't used CPAP.       Previous history of bleeding disorders or clots?: Yes  Previous Anesthesia reaction?: No  Prolonged steroid use in the last 6 months?: No    Assessment of Cardiac Risk:   - Unstable  or severe angina or MI in the last 6 weeks or history of stent placement in the last year?: No   - Decompensated heart failure (e.g. New onset heart failure, NYHA  Class IV heart failure, or worsening existing heart failure)?: No  - Significant arrhythmias such as high grade AV block, symptomatic ventricular arrhythmia, newly recognized ventricular tachycardia, supraventricular tachycardia with resting heart rate >100, or symptomatic bradycardia?: No  - Severe heart valve disease including aortic stenosis or symptomatic mitral stenosis?: No      Pre-operative Risk Factors:    History of cerebrovascular disease: No    History of ischemic heart disease: No  Pre-operative treatment with insulin: No  Pre-operative creatinine >2 mg/dL: No    History of congestive heart failure: No    Medications of Perioperative Concern:   Beta blockers and ACE inhibitors/ARBs    Review of Systems   Constitutional:  Positive for unexpected weight change.   HENT:  Negative for congestion and rhinorrhea.    Respiratory:  Positive for cough and shortness of breath.         Exertional dyspnea, patient had URI 2 weeks ago and has persistent cough.    Neurological:  Positive for headaches.     Past Medical History   Past Medical History:   Diagnosis Date    Depression     DVT (deep venous thrombosis) ([facility])     Migraine     Seizures ([facility])     Suicide attempt ([facility])      Past Surgical History:   Procedure Laterality Date     SECTION      CHOLECYSTECTOMY       Family History   Problem Relation Age of Onset    Seizures Mother     Seizures Brother     Seizures Maternal Aunt     Pancreatic cancer Maternal Uncle      Social History     Tobacco Use    Smoking status: Never     Passive exposure: Past    Smokeless tobacco: Never   Vaping Use    Vaping status: Never Used   Substance and Sexual Activity    Alcohol use: Not Currently    Drug use: No    Sexual activity: Not Currently     Current Outpatient Medications on File Prior to Visit  "  Medication Sig    Blood Pressure Monitoring (Adult Blood Pressure Cuff Lg) KIT Use 2 (two) times a day    citalopram (CeleXA) 20 mg tablet TAKE 1 TABLET BY MOUTH EVERY DAY    lisinopril (ZESTRIL) 5 mg tablet TAKE 1 TABLET (5 MG TOTAL) BY MOUTH DAILY.    ondansetron (ZOFRAN-ODT) 4 mg disintegrating tablet PLACE 1 ON TONGUE EVERY 8 HOURS AS NEEDED FOR NAUSEA AND VOMITING    propranolol (INDERAL LA) 80 mg 24 hr capsule TAKE 1 CAPSULE BY MOUTH EVERY DAY    SUMAtriptan (IMITREX) 50 mg tablet Take 1 tab at the start of a migraine headache, may repeat 1 tab in 2 hours.    topiramate (TOPAMAX) 100 mg tablet Take 1 tablet (100 mg total) by mouth daily AND 1.5 tablets (150 mg total) daily at bedtime.    OLANZapine (ZyPREXA) 5 mg tablet Take 1 tablet (5 mg total) by mouth daily at bedtime for 5 days     Allergies   Allergen Reactions    Other      Bleach    Diphenhydramine Angioedema    Motrin [Ibuprofen]     Redback Spider (JACKELINE Baez), Antivenin Swelling     Objective   /84 (BP Location: Right arm, Patient Position: Sitting, Cuff Size: Large)   Pulse 79   Temp 98.7 °F (37.1 °C) (Tympanic)   Ht 5' 2\" (1.575 m)   Wt (!) 143 kg (316 lb)   LMP 06/09/2024 (Approximate)   SpO2 94%   BMI 57.80 kg/m²     Physical Exam  HENT:      Mouth/Throat:      Mouth: Mucous membranes are moist.   Eyes:      Pupils: Pupils are equal, round, and reactive to light.   Cardiovascular:      Rate and Rhythm: Normal rate and regular rhythm.      Heart sounds: Normal heart sounds. No murmur heard.  Pulmonary:      Breath sounds: Decreased breath sounds present. No wheezing, rhonchi or rales.      Comments: Decrease breath sound due to body habitus, breath sounds shallow  Abdominal:      Palpations: Abdomen is soft.   Skin:     Findings: Lesion present. No rash.      Comments: Small swelling/ lump on his right leg, which patient note she's has had for several years., would like to get remove.   Neurological:      Mental Status: She is " alert and oriented to person, place, and time.   Psychiatric:         Mood and Affect: Mood normal.       Niraj Barone MD MPH

## 2025-02-18 NOTE — PATIENT INSTRUCTIONS
Medication Instructions:   - May take tylenol for pain up until the night before surgery    - ACE Inhibitors or ARBs: Continue this medication up to the evening before surgery/procedure, but do not take the morning of the day of surgery.  - 5HT3 Antagonist (ie, zofran):  Continue to take this medication on your normal schedule.   - Antidepressants: Continue to take this medication on your normal schedule.  - Antiepileptic meds: Continue to take this medication on your normal schedule.  - Beta blockers:  Continue to take this medication on your normal schedule.

## 2025-02-18 NOTE — TELEPHONE ENCOUNTER
Called the emergency contact tre. She didn't answer lmom to give us call back. For appt for EEG study.

## 2025-02-21 VITALS — HEIGHT: 62 IN | WEIGHT: 293 LBS | BODY MASS INDEX: 53.92 KG/M2

## 2025-02-21 NOTE — PRE-PROCEDURE INSTRUCTIONS
Pre-Surgery Instructions:   Medication Instructions    Biotin-Choline-Silicon 5-100-5 MG CAPS Hold day of surgery.    citalopram (CeleXA) 20 mg tablet Take day of surgery.    lisinopril (ZESTRIL) 5 mg tablet Hold day of surgery.    OLANZapine (ZyPREXA) 5 mg tablet Take day of surgery.    ondansetron (ZOFRAN-ODT) 4 mg disintegrating tablet Uses PRN- OK to take day of surgery    propranolol (INDERAL LA) 80 mg 24 hr capsule Take day of surgery.    SUMAtriptan (IMITREX) 50 mg tablet Uses PRN- OK to take day of surgery    topiramate (TOPAMAX) 100 mg tablet Take day of surgery.    Medication instructions for day of procedure reviewed. Please use only a sip of water to take your instructed morning medications (if any).      You will receive a call one business day prior to procedure with an arrival time and hospital directions. If procedure is scheduled on a Monday, the hospital will be calling you on the Friday prior to your procedure. If you have not heard from anyone by 8pm, please call the hospital supervisor through the hospital  at [phone]. (Felipe [phone]).     Please do not eat or drink after 11 pm the night before the MRI. Please take your medications with a sip of water at least 2 hours prior to their arrival time.     Please shower either the night prior to the procedure or the morning of the procedure. Dress in clean, comfortable clothes. All patients will be required to change into a hospital gown. Street clothes are not permitted in the MRI. Magnetic nail polish must be removed prior to the arrival.      Keep any valuables, jewelry, piercings at home.     Please bring your insurance cards, a form of photo ID, physician order, and a list of medications with you. Arrive 15 minutes prior to your given arrival time in order to register. Please bring any prior CT or MRI studies of the same area that were not performed at a [facility] along.      Arrange for a responsible person to  drive patient to and from the hospital on the day of the procedure. Visitor Guidelines discussed.     Call the prescribing physician's office with any new illnesses, exposures, or additional questions prior to procedure.

## 2025-03-05 ENCOUNTER — ANESTHESIA (OUTPATIENT)
Dept: RADIOLOGY | Facility: HOSPITAL | Age: 55
End: 2025-03-05
Payer: COMMERCIAL

## 2025-03-05 ENCOUNTER — HOSPITAL ENCOUNTER (OUTPATIENT)
Dept: RADIOLOGY | Facility: HOSPITAL | Age: 55
Discharge: HOME | End: 2025-03-05
Attending: PSYCHIATRY & NEUROLOGY
Payer: COMMERCIAL

## 2025-03-05 ENCOUNTER — ANESTHESIA EVENT (OUTPATIENT)
Dept: RADIOLOGY | Facility: HOSPITAL | Age: 55
End: 2025-03-05
Payer: COMMERCIAL

## 2025-03-05 VITALS
HEART RATE: 90 BPM | OXYGEN SATURATION: 95 % | TEMPERATURE: 97.6 F | DIASTOLIC BLOOD PRESSURE: 71 MMHG | RESPIRATION RATE: 18 BRPM | SYSTOLIC BLOOD PRESSURE: 135 MMHG

## 2025-03-05 DIAGNOSIS — G40.909 NONINTRACTABLE EPILEPSY WITHOUT STATUS EPILEPTICUS, UNSPECIFIED EPILEPSY TYPE (HCC): ICD-10-CM

## 2025-03-05 DIAGNOSIS — R29.2 HYPERREFLEXIA: ICD-10-CM

## 2025-03-05 LAB — B-HCG SERPL-ACNC: 4.9 MIU/ML (ref 0–5)

## 2025-03-05 PROCEDURE — A9585 GADOBUTROL INJECTION: HCPCS | Performed by: PSYCHIATRY & NEUROLOGY

## 2025-03-05 PROCEDURE — 84702 CHORIONIC GONADOTROPIN TEST: CPT | Performed by: STUDENT IN AN ORGANIZED HEALTH CARE EDUCATION/TRAINING PROGRAM

## 2025-03-05 PROCEDURE — 70553 MRI BRAIN STEM W/O & W/DYE: CPT

## 2025-03-05 PROCEDURE — 72141 MRI NECK SPINE W/O DYE: CPT

## 2025-03-05 RX ORDER — SODIUM CHLORIDE, SODIUM LACTATE, POTASSIUM CHLORIDE, CALCIUM CHLORIDE 600; 310; 30; 20 MG/100ML; MG/100ML; MG/100ML; MG/100ML
125 INJECTION, SOLUTION INTRAVENOUS CONTINUOUS
Status: DISCONTINUED | OUTPATIENT
Start: 2025-03-05 | End: 2025-03-06 | Stop reason: HOSPADM

## 2025-03-05 RX ORDER — SUCCINYLCHOLINE/SOD CL,ISO/PF 100 MG/5ML
SYRINGE (ML) INTRAVENOUS AS NEEDED
Status: DISCONTINUED | OUTPATIENT
Start: 2025-03-05 | End: 2025-03-05

## 2025-03-05 RX ORDER — EPHEDRINE SULFATE 50 MG/ML
INJECTION INTRAVENOUS AS NEEDED
Status: DISCONTINUED | OUTPATIENT
Start: 2025-03-05 | End: 2025-03-05

## 2025-03-05 RX ORDER — PROPOFOL 10 MG/ML
INJECTION, EMULSION INTRAVENOUS AS NEEDED
Status: DISCONTINUED | OUTPATIENT
Start: 2025-03-05 | End: 2025-03-05

## 2025-03-05 RX ORDER — ROCURONIUM BROMIDE 10 MG/ML
INJECTION, SOLUTION INTRAVENOUS AS NEEDED
Status: DISCONTINUED | OUTPATIENT
Start: 2025-03-05 | End: 2025-03-05

## 2025-03-05 RX ORDER — LIDOCAINE HYDROCHLORIDE 10 MG/ML
0.5 INJECTION, SOLUTION EPIDURAL; INFILTRATION; INTRACAUDAL; PERINEURAL ONCE AS NEEDED
Status: COMPLETED | OUTPATIENT
Start: 2025-03-05 | End: 2025-03-05

## 2025-03-05 RX ORDER — SODIUM CHLORIDE, SODIUM LACTATE, POTASSIUM CHLORIDE, CALCIUM CHLORIDE 600; 310; 30; 20 MG/100ML; MG/100ML; MG/100ML; MG/100ML
INJECTION, SOLUTION INTRAVENOUS CONTINUOUS PRN
Status: DISCONTINUED | OUTPATIENT
Start: 2025-03-05 | End: 2025-03-05

## 2025-03-05 RX ORDER — LIDOCAINE HYDROCHLORIDE 10 MG/ML
INJECTION, SOLUTION EPIDURAL; INFILTRATION; INTRACAUDAL; PERINEURAL AS NEEDED
Status: DISCONTINUED | OUTPATIENT
Start: 2025-03-05 | End: 2025-03-05

## 2025-03-05 RX ORDER — GADOBUTROL 604.72 MG/ML
14 INJECTION INTRAVENOUS
Status: COMPLETED | OUTPATIENT
Start: 2025-03-05 | End: 2025-03-05

## 2025-03-05 RX ORDER — ONDANSETRON 2 MG/ML
INJECTION INTRAMUSCULAR; INTRAVENOUS AS NEEDED
Status: DISCONTINUED | OUTPATIENT
Start: 2025-03-05 | End: 2025-03-05

## 2025-03-05 RX ORDER — MIDAZOLAM HYDROCHLORIDE 2 MG/2ML
INJECTION, SOLUTION INTRAMUSCULAR; INTRAVENOUS AS NEEDED
Status: DISCONTINUED | OUTPATIENT
Start: 2025-03-05 | End: 2025-03-05

## 2025-03-05 RX ADMIN — SUGAMMADEX 400 MG: 100 INJECTION, SOLUTION INTRAVENOUS at 12:25

## 2025-03-05 RX ADMIN — Medication 170 MG: at 11:00

## 2025-03-05 RX ADMIN — LIDOCAINE HYDROCHLORIDE 2 ML: 10 INJECTION, SOLUTION EPIDURAL; INFILTRATION; INTRACAUDAL; PERINEURAL at 11:00

## 2025-03-05 RX ADMIN — MIDAZOLAM 2 MG: 1 INJECTION INTRAMUSCULAR; INTRAVENOUS at 10:52

## 2025-03-05 RX ADMIN — EPHEDRINE SULFATE 10 MG: 50 INJECTION, SOLUTION INTRAVENOUS at 11:51

## 2025-03-05 RX ADMIN — ONDANSETRON 4 MG: 2 INJECTION INTRAMUSCULAR; INTRAVENOUS at 11:00

## 2025-03-05 RX ADMIN — GADOBUTROL 14 ML: 604.72 INJECTION INTRAVENOUS at 11:59

## 2025-03-05 RX ADMIN — EPHEDRINE SULFATE 10 MG: 50 INJECTION, SOLUTION INTRAVENOUS at 11:34

## 2025-03-05 RX ADMIN — LIDOCAINE HYDROCHLORIDE 0.5 ML: 10 INJECTION, SOLUTION EPIDURAL; INFILTRATION; INTRACAUDAL; PERINEURAL at 10:10

## 2025-03-05 RX ADMIN — ROCURONIUM BROMIDE 10 MG: 10 INJECTION, SOLUTION INTRAVENOUS at 11:36

## 2025-03-05 RX ADMIN — PROPOFOL 180 MG: 10 INJECTION, EMULSION INTRAVENOUS at 11:00

## 2025-03-05 RX ADMIN — SODIUM CHLORIDE, SODIUM LACTATE, POTASSIUM CHLORIDE, AND CALCIUM CHLORIDE 125 ML/HR: .6; .31; .03; .02 INJECTION, SOLUTION INTRAVENOUS at 10:11

## 2025-03-05 RX ADMIN — SODIUM CHLORIDE, SODIUM LACTATE, POTASSIUM CHLORIDE, AND CALCIUM CHLORIDE: .6; .31; .03; .02 INJECTION, SOLUTION INTRAVENOUS at 10:39

## 2025-03-05 RX ADMIN — ROCURONIUM BROMIDE 20 MG: 10 INJECTION, SOLUTION INTRAVENOUS at 11:04

## 2025-03-05 RX ADMIN — MIDAZOLAM 2 MG: 1 INJECTION INTRAMUSCULAR; INTRAVENOUS at 11:00

## 2025-03-05 NOTE — ANESTHESIA PREPROCEDURE EVALUATION
Procedure:  MRI CERVICAL SPINE WO CONTRAST  MRI BRAIN SEIZURE WO AND W CONTRAST    Relevant Problems   CARDIO   (+) Intractable migraine without aura and with status migrainosus   (+) Primary hypertension      MUSCULOSKELETAL   (+) Chronic right-sided low back pain without sciatica      NEURO/PSYCH   (+) Chronic daily headache   (+) Chronic right-sided low back pain without sciatica   (+) Intractable migraine without aura and with status migrainosus   (+) Medication overuse headache   (+) Nonintractable epilepsy without status epilepticus (HCC)   (+) Severe episode of recurrent major depressive disorder, without psychotic features (HCC)      PULMONARY   (+) Obstructive sleep apnea syndrome in adult      Neurology/Sleep   (+) Hyperreflexia      Other   (+) Leg swelling      EKG 11/22/2024:  Normal sinus rhythm  Low voltage QRS  Cannot rule out Anterior infarct , age undetermined  Abnormal ECG  When compared with ECG of 15-Jul-2024 16:30,  Minimal criteria for Anterior infarct are now Present  Nonspecific T wave abnormality now evident in Anterolateral leads    Lab Results   Component Value Date    WBC 6.44 07/15/2024    HGB 13.1 07/15/2024    HCT 40.2 07/15/2024    MCV 94 07/15/2024     07/15/2024     Lab Results   Component Value Date    SODIUM 139 10/31/2024    K 4.2 10/31/2024     10/31/2024    CO2 26 10/31/2024    BUN 15 10/31/2024    CREATININE 0.68 10/31/2024    GLUC 91 07/15/2024    CALCIUM 8.8 10/31/2024     Lab Results   Component Value Date    INR 0.96 05/31/2021    INR <0.90 (L) 04/10/2021    PROTIME 10.9 05/31/2021    PROTIME 10.1 04/10/2021     Lab Results   Component Value Date    HGBA1C 5.7 08/12/2024          Physical Exam    Airway    Mallampati score: II  TM Distance: >3 FB  Neck ROM: full     Dental   No notable dental hx     Cardiovascular  Cardiovascular exam normal    Pulmonary  Pulmonary exam normal     Other Findings  post-pubertal.      Anesthesia Plan  ASA Score- 3      Anesthesia Type- general with ASA Monitors.         Additional Monitors:     Airway Plan: ETT.           Plan Factors-    Chart reviewed. EKG reviewed.  Existing labs reviewed. Patient summary reviewed.                  Induction- intravenous.    Postoperative Plan-         Informed Consent- Anesthetic plan and risks discussed with patient.  I personally reviewed this patient with the CRNA. Discussed and agreed on the Anesthesia Plan with the CRNA..      NPO Status:  No vitals data found for the desired time range.

## 2025-03-05 NOTE — NURSING NOTE
Pt given 4 mg Versed for anxiety prior to starting.  Pt intubated d/t compromised airway and size.    MRI cervical spine without contrast complete.  MRI brain seizure w/without contrast complete.  Pt tolerated well.  VSS post procedure. Pt needed supplemental o2 6 L to start simple mask--titrated down to 2Lo2 upon transport.   No complaints or visible signs of distress.    Report given to   Mac--[facility] RN.  Tansported back to [facility].

## 2025-03-05 NOTE — ANESTHESIA POSTPROCEDURE EVALUATION
Post-Op Assessment Note    CV Status:  Stable  Pain Score: 0    Pain management: adequate       Mental Status:  Arousable   Hydration Status:  Euvolemic   PONV Controlled:  Controlled   Airway Patency:  Patent     Post Op Vitals Reviewed: Yes    No anethesia notable event occurred.    Staff: Anesthesiologist, CRNA           Last Filed PACU Vitals:  Vitals Value Taken Time   Temp 97.2 °F (36.2 °C) 03/05/25 1238   Pulse 106 03/05/25 1238   /78 03/05/25 1238   Resp 20 03/05/25 1238   SpO2 95 % face mask 03/05/25 1238

## 2025-03-05 NOTE — LETTER
[facility]  [address] [address]      March 10, 2025    MRN: [id number]     Phone: [phone]     Dear Ms. Arteaga,    You recently had a(n) MRI performed on 3/5/2025 at  [facility] that was requested by Case Moise MD. The study was reviewed by a radiologist, which is a physician who specializes in medical imaging. The radiologist issued a report describing his or her findings. In that report there was a finding that the radiologist felt warranted further discussion with your health care provider and that discussion would be beneficial to you.      The results were sent to Case Moise MD on 03/06/2025 10:43 AM. We recommend that you contact Case Moise MD at [phone] or set up an appointment to discuss the results of the imaging test. If you have already heard from Case Moise MD regarding the results of your study, you can disregard this letter.     This letter is not meant to alarm you, but intended to encourage you to follow-up on your results with the provider that sent you for the imaging study. In addition, we have enclosed answers to frequently asked questions by other patients who have also received a letter to review results with their health care provider (see page two).      Thank you for choosing [facility] for your medical imaging needs.                                                                                                                                                        FREQUENTLY ASKED QUESTIONS    Why am I receiving this letter?  [place] State Law requires us to notify patients who have findings on imaging exams that may require more testing or follow-up with a health professional within the next 3 months.        How serious is the finding on the imaging test?  This letter is sent to all patients who may need follow-up or more testing within the next 3 months.   Receiving this letter does not necessarily mean you have a life-threatening imaging finding or disease.  Recommendations in the radiologist’s imaging report are general in nature and it is up to your healthcare provider to say whether those recommendations make sense for your situation.  You are strongly encouraged to talk to your health care provider about the results and ask whether additional steps need to be taken.    Where can I get a copy of the final report for my recent radiology exam?  To get a full copy of the report you can access your records online at [email or web address] or please contact [facility]’s Medical Records Department at [phone] Monday through Friday between 8 am and 6 pm.         What do I need to do now?           Please contact your health care provider who requested the imaging study to discuss what further actions (if any) are needed.  You may have already heard from (your ordering provider) in regard to this test in which case you can disregard this letter.        NOTICE IN ACCORDANCE WITH THE [place] STATE “PATIENT TEST RESULT INFORMATION ACT OF 2018”    You are receiving this notice as a result of a determination by your diagnostic imaging service that further discussions of your test results are warranted and would be beneficial to you.    The complete results of your test or tests have been or will be sent to the health care practitioner that ordered the test or tests. It is recommended that you contact your health care practitioner to discuss your results as soon as possible.

## 2025-03-06 ENCOUNTER — RESULTS FOLLOW-UP (OUTPATIENT)
Dept: NEUROLOGY | Facility: CLINIC | Age: 55
End: 2025-03-06

## 2025-03-17 DIAGNOSIS — F33.2 SEVERE EPISODE OF RECURRENT MAJOR DEPRESSIVE DISORDER, WITHOUT PSYCHOTIC FEATURES (HCC): Chronic | ICD-10-CM

## 2025-03-18 RX ORDER — CITALOPRAM HYDROBROMIDE 20 MG/1
20 TABLET ORAL DAILY
Qty: 90 TABLET | Refills: 0 | Status: SHIPPED | OUTPATIENT
Start: 2025-03-18

## 2025-03-26 NOTE — RESULT ENCOUNTER NOTE
Patient was added on 3/27/25 @11:00am VV. Patient is unale to make it due to the transportation issue. As per Her Ec her daughter Gema.

## 2025-03-28 DIAGNOSIS — G43.011 INTRACTABLE MIGRAINE WITHOUT AURA AND WITH STATUS MIGRAINOSUS: ICD-10-CM

## 2025-03-28 DIAGNOSIS — R51.9 ACUTE INTRACTABLE HEADACHE, UNSPECIFIED HEADACHE TYPE: ICD-10-CM

## 2025-03-28 RX ORDER — PROPRANOLOL HYDROCHLORIDE 80 MG/1
80 CAPSULE, EXTENDED RELEASE ORAL DAILY
Qty: 90 CAPSULE | Refills: 1 | Status: SHIPPED | OUTPATIENT
Start: 2025-03-28

## 2025-04-03 ENCOUNTER — TELEPHONE (OUTPATIENT)
Dept: NEUROLOGY | Facility: CLINIC | Age: 55
End: 2025-04-03

## 2025-04-10 ENCOUNTER — TELEMEDICINE (OUTPATIENT)
Dept: NEUROLOGY | Facility: CLINIC | Age: 55
End: 2025-04-10
Payer: COMMERCIAL

## 2025-04-10 ENCOUNTER — PATIENT MESSAGE (OUTPATIENT)
Age: 55
End: 2025-04-10

## 2025-04-10 VITALS — BODY MASS INDEX: 53.92 KG/M2 | WEIGHT: 293 LBS | HEIGHT: 62 IN

## 2025-04-10 DIAGNOSIS — G40.909 NONINTRACTABLE EPILEPSY WITHOUT STATUS EPILEPTICUS, UNSPECIFIED EPILEPSY TYPE (HCC): Primary | ICD-10-CM

## 2025-04-10 DIAGNOSIS — G93.2 INTRACRANIAL HYPERTENSION, BENIGN: ICD-10-CM

## 2025-04-10 DIAGNOSIS — F33.2 SEVERE EPISODE OF RECURRENT MAJOR DEPRESSIVE DISORDER, WITHOUT PSYCHOTIC FEATURES (HCC): Chronic | ICD-10-CM

## 2025-04-10 DIAGNOSIS — R51.9 CHRONIC DAILY HEADACHE: ICD-10-CM

## 2025-04-10 DIAGNOSIS — E66.01 MORBID OBESITY (HCC): ICD-10-CM

## 2025-04-10 PROCEDURE — 99215 OFFICE O/P EST HI 40 MIN: CPT | Performed by: PSYCHIATRY & NEUROLOGY

## 2025-04-10 NOTE — PROGRESS NOTES
Virtual Regular VisitName: Maddie Arteaga      : 1970      MRN: [id number]  Encounter Provider: Case Moise MD  Encounter Date: 4/10/2025   Encounter department: [facility]  :  Assessment & Plan        History of Present Illness {?Quick Links Encounters * My Last Note * Last Note in Specialty * Snapshot * Since Last Visit * History :00160}    HPI  Review of Systems    Objective {?Quick Links Trend Vitals * Enter New Vitals * Results Review * Timeline (Adult) * Labs * Imaging * Cardiology * Procedures * Lung Cancer Screening * Surgical eConsent :15027}  LMP 2024 (Approximate)     Physical Exam

## 2025-04-10 NOTE — PATIENT INSTRUCTIONS
Recurrent seizures; possible generalized genetic epilepsy versus psychogenic nonepileptic seizures.  There is inconsistency in the seizure semiology.  Please try to video record the seizure if it happens again  - routine EEG study  - Continue with topiramate 100mg tab to one tab in the morning and one and a half tab at bedtime  - check topiramate level and BMP (please get this done prior to the next office visit)    Intractable migraines and migraines  - Pending an evaluation by sleep medicine about obstructive sleep apnea as a cause of your chronic daily headaches  - continue with topiramate  - avoid daily use of acetaminophen and ibuprofen  - you may use Sumatriptan (Imitrex) 50mg tab for severe headache take one tab as needed  - continue with propranolol LA 80mg daily     Abnormal MRI brain study suggestive of pseudotumor cerebrii or idiopathic intracranial hypertension  - referral to radiology for Lumbar puncture to measure opening pressure and closing pressure after a large volume removal.  - check INR/PT, PTT, CSF for white count, RBC, protein, glucose, and culture  - referral to ophthalmology for evaluation of visual fields and papilledema  - recommend referral to Weight Management for evaluation for weight loss program to help with KATHERINE and back pain.    Follow-up in 1 month with LARISA

## 2025-04-10 NOTE — ASSESSMENT & PLAN NOTE
- Pending an evaluation by sleep medicine about obstructive sleep apnea as a cause of your chronic daily headaches  - continue with topiramate  - avoid daily use of acetaminophen and ibuprofen  - you may use Sumatriptan (Imitrex) 50mg tab for severe headache take one tab as needed  - continue with propranolol LA 80mg daily     Orders:    [place] OUT-patient lumbar puncture; Future    Ambulatory Referral to Weight Management; Future    Ambulatory Referral to Ophthalmology; Future

## 2025-04-10 NOTE — ASSESSMENT & PLAN NOTE
Abnormal MRI brain study suggestive of pseudotumor cerebrii or idiopathic intracranial hypertension  - referral to radiology for Lumbar puncture to measure opening pressure and closing pressure after a large volume removal.  - check INR/PT, PTT, CSF for white count, RBC, protein, glucose, and culture  - referral to ophthalmology for evaluation of visual fields and papilledema  - recommend referral to Weight Management for evaluation for weight loss program to help with KATHERINE and back pain.    Orders:    [place] OUT-patient lumbar puncture; Future    Ambulatory Referral to Weight Management; Future    Ambulatory Referral to Ophthalmology; Future

## 2025-04-10 NOTE — ASSESSMENT & PLAN NOTE
Recurrent seizures; possible generalized genetic epilepsy versus psychogenic nonepileptic seizures.  There is inconsistency in the seizure semiology.  Please try to video record the seizure if it happens again  - routine EEG study  - Continue with topiramate 100mg tab to one tab in the morning and one and a half tab at bedtime  - check topiramate level and BMP (please get this done prior to the next office visit)
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
## 2025-04-11 ENCOUNTER — TELEPHONE (OUTPATIENT)
Dept: NEUROLOGY | Facility: CLINIC | Age: 55
End: 2025-04-11

## 2025-04-11 DIAGNOSIS — G43.009 MIGRAINE WITHOUT AURA AND WITHOUT STATUS MIGRAINOSUS, NOT INTRACTABLE: Primary | ICD-10-CM

## 2025-04-16 ENCOUNTER — OFFICE VISIT (OUTPATIENT)
Dept: FAMILY MEDICINE CLINIC | Facility: CLINIC | Age: 55
End: 2025-04-16
Payer: COMMERCIAL

## 2025-04-16 VITALS
DIASTOLIC BLOOD PRESSURE: 81 MMHG | TEMPERATURE: 98 F | HEART RATE: 71 BPM | HEIGHT: 62 IN | SYSTOLIC BLOOD PRESSURE: 157 MMHG | WEIGHT: 293 LBS | BODY MASS INDEX: 53.92 KG/M2 | OXYGEN SATURATION: 95 %

## 2025-04-16 DIAGNOSIS — F33.2 SEVERE EPISODE OF RECURRENT MAJOR DEPRESSIVE DISORDER, WITHOUT PSYCHOTIC FEATURES (HCC): Primary | Chronic | ICD-10-CM

## 2025-04-16 DIAGNOSIS — R51.9 ACUTE INTRACTABLE HEADACHE, UNSPECIFIED HEADACHE TYPE: ICD-10-CM

## 2025-04-16 DIAGNOSIS — E66.01 MORBID OBESITY WITH BMI OF 50.0-59.9, ADULT (HCC): ICD-10-CM

## 2025-04-16 DIAGNOSIS — Z13.6 SCREENING FOR CARDIOVASCULAR CONDITION: ICD-10-CM

## 2025-04-16 PROCEDURE — 99213 OFFICE O/P EST LOW 20 MIN: CPT

## 2025-04-16 RX ORDER — CITALOPRAM HYDROBROMIDE 20 MG/1
30 TABLET ORAL DAILY
Qty: 90 TABLET | Refills: 0 | Status: SHIPPED | OUTPATIENT
Start: 2025-04-16 | End: 2025-04-17 | Stop reason: SDUPTHER

## 2025-04-16 RX ORDER — CITALOPRAM HYDROBROMIDE 20 MG/1
30 TABLET ORAL DAILY
Qty: 90 TABLET | Refills: 0 | Status: SHIPPED | OUTPATIENT
Start: 2025-04-16 | End: 2025-04-16

## 2025-04-16 NOTE — ASSESSMENT & PLAN NOTE
Patient presents with her daughter due to increase in depression symptoms since increasing the dose of topiramate to 150 Mg in the afternoon on 2/11/2025.  The daughter reports patient going through recurrent depression episodes since COVID 2020.  Patient and daughter reports stable depression and anxiety symptoms for over a year and a half that have been well-controlled with Celexa 20 mg  Today, PHQ-9 score 8  Patient reports feeling more depressed, feeling down and more sleeping.  Patient reports good appetite without any suicidal or homicidal ideas or plans.    Plan:  Increase Celexa to 30 mg once a day, after a shared decision making with patient and daughter since patient has been tolerating the medication well.  Consider increase to 40 mg depending on patient response.  Continue her headache medication regimen.  Follow-up on neurology's recommendation and post LP procedure outcomes.  Follow-up in 4 weeks.      Orders:    CBC and differential; Future

## 2025-04-16 NOTE — ASSESSMENT & PLAN NOTE
- recommend referral to Weight Management for evaluation for weight loss program  Orders:    Ambulatory Referral to Weight Management; Future

## 2025-04-16 NOTE — PROGRESS NOTES
Name: Maddie Arteaga      : 1970      MRN: [id number]  Encounter Provider: Sebastian Meraz MD  Encounter Date: 2025   Encounter department: [facility]  :  Assessment & Plan  Severe episode of recurrent major depressive disorder, without psychotic features (HCC)  Patient presents with her daughter due to increase in depression symptoms since increasing the dose of topiramate to 150 Mg in the afternoon on 2025.  The daughter reports patient going through recurrent depression episodes since COVID 2020.  Patient and daughter reports stable depression and anxiety symptoms for over a year and a half that have been well-controlled with Celexa 20 mg  Today, PHQ-9 score 8  Patient reports feeling more depressed, feeling down and more sleeping.  Patient reports good appetite without any suicidal or homicidal ideas or plans.    Plan:  Increase Celexa to 30 mg once a day, after a shared decision making with patient and daughter since patient has been tolerating the medication well.  Consider increase to 40 mg depending on patient response.  Continue her headache medication regimen.  Follow-up on neurology's recommendation and post LP procedure outcomes.  Follow-up in 4 weeks.      Orders:    CBC and differential; Future    Acute intractable headache, unspecified headache type  Patient has a history of chronic intractable headaches that has been worsening throughout the past year.  Patient has been on topiramate 100 mg twice daily, sumatriptan 50 mg as needed, Tylenol as needed.  Patient missed multiple neurology follow-ups during the past year however, she reestablished care on 2025.  During that visit topiramate was increased to 100 Mg in the morning and 150 Mg in the afternoon.  Patient also was sent to to do a brain MRI that showed: No structural abnormalities but there was findings of optic nerve sheath prominence and transverse sinus stenosis in the setting of idiopathic  intracranial hypertension.  Normal cervical spine MRI.  Patient was scheduled to follow-up with neurology on 4/21/2025 for possible LP procedure.    Plan:  For now, continue topiramate 100 and 150   Continue propranolol 80 mg.  Continue rescue sumatriptan 50 mg  Follow-up with neurology recommendation  We will follow-up in 4 weeks.    Orders:    CBC and differential; Future    Screening for cardiovascular condition    Orders:    Hemoglobin A1C; Future    Morbid obesity with BMI of 50.0-59.9, adult ([facility])      Orders:    Hemoglobin A1C; Future           History of Present Illness   54-year-old female PMH of HTN, depression, KATHERINE, chronic headaches, seizures (without supported diagnosis),  new finding of pseudotumor cerebrii or idiopathic intracranial hypertension on brain MRI 3/5/25 presents today due to increased depression symptoms.    Topiramate was increased to 100 mg in am and 150 in pm on 2/11/2025  (from 100 mg BID)  Brain MRI on 3/5/2025: Minimal white matter changes suggestive of chronic microangiopathy.  No acute intracranial pathology.  No seizure focus or enhancing lesion seen.  Optic nerve sheath prominence and transverse sinus stenosis which can be seen in the setting of idiopathic intracranial hypertension. Recommend funduscopic exam to assess for papilledema and consider LP with opening pressure. Of note, there is no  partially empty sella.  - Patient is scheduled to follow-up with neurology on 4/21 and 4/25.      #Problem list and medication:  -HTN (lisinopril+propanolol) : propanolol 80 mg started on 11/21/2024  -intractable headaches f/u neurology: sumatriptan for sever headachesand topiramate for prevention  -MDD celexa 20 mg   -katherine not on CPAP/BiPAP. Need to follow-up with sleep referral            Review of Systems   Constitutional:  Positive for activity change and fatigue. Negative for appetite change, chills, fever and unexpected weight change.   HENT:  Negative for congestion, ear pain,  "postnasal drip, rhinorrhea and sore throat.    Eyes:  Negative for pain and visual disturbance.   Respiratory:  Negative for cough, chest tightness and shortness of breath.    Cardiovascular:  Negative for chest pain, palpitations and leg swelling.   Gastrointestinal:  Negative for abdominal pain, diarrhea, nausea and vomiting.   Genitourinary:  Negative for dysuria and hematuria.   Musculoskeletal:  Negative for arthralgias, back pain, neck pain and neck stiffness.   Skin:  Negative for color change and rash.   Neurological:  Positive for headaches. Negative for dizziness, seizures, syncope, speech difficulty and weakness.   Psychiatric/Behavioral:  Positive for sleep disturbance. Negative for hallucinations and suicidal ideas. The patient is nervous/anxious.    All other systems reviewed and are negative.      Objective   /81 (BP Location: Left arm, Patient Position: Sitting, Cuff Size: Large) Comment (BP Location): fore arm  Pulse 71   Temp 98 °F (36.7 °C) (Temporal)   Ht 5' 2\" (1.575 m)   Wt (!) 141 kg (311 lb)   LMP 06/09/2024 (Approximate)   SpO2 95%   BMI 56.88 kg/m²      Physical Exam  Vitals and nursing note reviewed.   Constitutional:       General: She is not in acute distress.     Appearance: Normal appearance. She is well-developed. She is obese. She is not ill-appearing.   HENT:      Head: Normocephalic and atraumatic.   Eyes:      General: No scleral icterus.     Conjunctiva/sclera: Conjunctivae normal.   Cardiovascular:      Rate and Rhythm: Normal rate and regular rhythm.      Heart sounds: No murmur heard.  Pulmonary:      Effort: Pulmonary effort is normal. No respiratory distress.      Breath sounds: Normal breath sounds. No wheezing.   Abdominal:      Palpations: Abdomen is soft.      Tenderness: There is no abdominal tenderness. There is no guarding or rebound.   Musculoskeletal:         General: No swelling.      Cervical back: Neck supple.   Skin:     General: Skin is warm and " dry.      Capillary Refill: Capillary refill takes less than 2 seconds.   Neurological:      General: No focal deficit present.      Mental Status: She is alert and oriented to person, place, and time.      Cranial Nerves: No cranial nerve deficit.      Sensory: No sensory deficit.      Motor: No weakness.   Psychiatric:         Attention and Perception: She does not perceive auditory or visual hallucinations.         Mood and Affect: Mood is depressed. Affect is not tearful.         Speech: Speech normal.         Behavior: Behavior normal.         Thought Content: Thought content is not delusional. Thought content does not include homicidal or suicidal ideation. Thought content does not include homicidal or suicidal plan.

## 2025-04-17 ENCOUNTER — TELEPHONE (OUTPATIENT)
Age: 55
End: 2025-04-17

## 2025-04-17 DIAGNOSIS — F33.2 SEVERE EPISODE OF RECURRENT MAJOR DEPRESSIVE DISORDER, WITHOUT PSYCHOTIC FEATURES (HCC): Chronic | ICD-10-CM

## 2025-04-17 RX ORDER — OLANZAPINE 5 MG/1
5 TABLET ORAL
Qty: 5 TABLET | Refills: 0 | OUTPATIENT
Start: 2025-04-17 | End: 2025-04-22

## 2025-04-17 RX ORDER — CITALOPRAM HYDROBROMIDE 20 MG/1
30 TABLET ORAL DAILY
Qty: 90 TABLET | Refills: 0 | Status: SHIPPED | OUTPATIENT
Start: 2025-04-17

## 2025-04-17 NOTE — TELEPHONE ENCOUNTER
----- Message from Case Moise MD sent at 4/16/2025  6:46 PM EDT -----  Regarding: outpatient LP  Patient needs a lumbar puncture to assess for intracranial hypertension.  I placed an order for referral to radiology for LP.  Please assist patient in scheduling.

## 2025-04-17 NOTE — TELEPHONE ENCOUNTER
Please clarify the request for olanzapine.  It was prescribed in the past to abort chronic daily headaches with dexamethasone.  So it was for limited use to 5 days, not meant for chronic use.  Was this an automated request by [facility]?  Did the patient feel that it help with aborting headache?

## 2025-04-17 NOTE — TELEPHONE ENCOUNTER
Transmission to pharmacy error occurred.  Script sent to [facility] #[id number], which is closing and not accepting new orders.       Medication resent to [facility] #[id number].  Receipt confirmed by pharmacy.    E-Prescribing Status: Receipt confirmed by pharmacy (4/17/2025  2:46 PM EDT)

## 2025-04-17 NOTE — TELEPHONE ENCOUNTER
Patient is requesting Olanzapine 5 mg #5  Take 1 tab at bedtime X 5 days.    Dr. Moise, please sign pended script if in agreement.

## 2025-04-21 ENCOUNTER — HOSPITAL ENCOUNTER (OUTPATIENT)
Dept: NEUROLOGY | Facility: CLINIC | Age: 55
Discharge: HOME | End: 2025-04-21
Payer: COMMERCIAL

## 2025-04-21 DIAGNOSIS — G40.909 NONINTRACTABLE EPILEPSY WITHOUT STATUS EPILEPTICUS, UNSPECIFIED EPILEPSY TYPE (HCC): ICD-10-CM

## 2025-04-21 PROCEDURE — 95819 EEG AWAKE AND ASLEEP: CPT

## 2025-04-21 PROCEDURE — 95819 EEG AWAKE AND ASLEEP: CPT | Performed by: PSYCHIATRY & NEUROLOGY

## 2025-04-22 ENCOUNTER — RESULTS FOLLOW-UP (OUTPATIENT)
Dept: NEUROLOGY | Facility: CLINIC | Age: 55
End: 2025-04-22

## 2025-04-24 ENCOUNTER — OFFICE VISIT (OUTPATIENT)
Dept: SLEEP CENTER | Facility: CLINIC | Age: 55
End: 2025-04-24
Payer: COMMERCIAL

## 2025-04-24 VITALS — BODY MASS INDEX: 53.92 KG/M2 | HEIGHT: 62 IN | WEIGHT: 293 LBS

## 2025-04-24 DIAGNOSIS — R51.9 CHRONIC DAILY HEADACHE: ICD-10-CM

## 2025-04-24 DIAGNOSIS — I10 PRIMARY HYPERTENSION: ICD-10-CM

## 2025-04-24 DIAGNOSIS — E66.01 MORBID OBESITY (HCC): ICD-10-CM

## 2025-04-24 DIAGNOSIS — G47.33 OBSTRUCTIVE SLEEP APNEA SYNDROME IN ADULT: Primary | ICD-10-CM

## 2025-04-24 PROCEDURE — 99244 OFF/OP CNSLTJ NEW/EST MOD 40: CPT | Performed by: INTERNAL MEDICINE

## 2025-04-24 NOTE — PROGRESS NOTES
Name: Maddie Arteaga      : 1970      MRN: [id number]  Encounter Provider: Gabriel Michaud MD  Encounter Date: 2025   Encounter department: [facility] [place]  :  Assessment & Plan  Obstructive sleep apnea syndrome in adult  Underlying sleep apnea with potential chronic hypercapnia potentially related to OHS and untreated sleep apnea.  Discussed in detail the need to repeat sleep study even though this was completed 8 years ago and able to retrieve records.  If indicated patient is amenable to surgery start CPAP treatment.    Discussed ongoing lifestyle modification and weight management as obesity is definitely playing a part in her symptoms and deconditioning.  And treatment will definitely would benefit their quality of life.    Plan   diagnostic sleep study order patient will return to clinic in 3 months after sleep study.  Orders:    Ambulatory Referral to Sleep Medicine    Diagnostic Sleep Study; Future        History of Present Illness   54-year-old female with medical history of seizure disorder, severe major depression, obesity, chronic headaches currently evaluated by Neurology recommendations to evaluate sleep apnea. Patient endorse had a sleep study completed approximately 8 years ago at the time did tried CPAP however was not able to tolerate mask. She endorse ongoing fatigue, day time sleepiness, snoring however per son who provides collateral history has not seen apneic episodes. Endorse that she sleeps approximately 8-9 hours. She goes to bed around midnight, takes proximately 60 min to fall asleep.  Her sleep is interrupted by urinary frequency, least 3 times per hour.  Upon awakening she wakes up with headaches that has been ongoing for a while during the day and she can take up to 2 naps for approximately 1 to 2 hours and waking unrefreshed and ready to continue her day.  There is rumination during her sleep including thrashing or kicking as well as involuntary movements  "during sleep she does not take medications for sleep however is currently under treatment for major depression.  She denies any caffeine intake other than that 1 to 2 cups in the morning time.  Denies any smoking or alcohol use.    Over the past 6 months she has lost approximately 30 pounds with lifestyle modifications including diet and physical activity.  Currently is awaiting to establish care with weight management program.                   Sitting and reading: Would never doze  Watching TV: Moderate chance of dozing  Sitting, inactive in a public place (e.g. a theatre or a meeting): Slight chance of dozing  As a passenger in a car for an hour without a break: Would never doze  Lying down to rest in the afternoon when circumstances permit: High chance of dozing  Sitting and talking to someone: Would never doze  Sitting quietly after a lunch without alcohol: Would never doze  In a car, while stopped for a few minutes in traffic: Would never doze  Total score: 6     Review of Systems   Constitutional:  Positive for fatigue. Negative for chills and fever.   HENT:  Negative for ear pain and sore throat.    Eyes:  Negative for pain and visual disturbance.   Respiratory:  Negative for cough and shortness of breath.    Cardiovascular:  Negative for chest pain and palpitations.   Gastrointestinal:  Negative for abdominal pain and vomiting.   Genitourinary:  Negative for dysuria and hematuria.   Musculoskeletal:  Negative for arthralgias and back pain.   Skin:  Negative for color change and rash.   Neurological:  Positive for headaches. Negative for seizures and syncope.   All other systems reviewed and are negative.    Pertinent positives/negatives included in HPI and also as noted:       Objective   Ht 5' 2\" (1.575 m)   Wt (!) 142 kg (313 lb 6.4 oz)   LMP 06/09/2024 (Approximate)   BMI 57.32 kg/m²     Neck Circumference: 19  Physical Exam  Vitals and nursing note reviewed.   Constitutional:       General: She is " "not in acute distress.     Appearance: Normal appearance. She is obese. She is not ill-appearing.   HENT:      Head: Normocephalic and atraumatic.      Right Ear: External ear normal.      Left Ear: External ear normal.      Nose: Nose normal.      Mouth/Throat:      Mouth: Mucous membranes are moist.      Comments: Mallampati 2  Eyes:      Extraocular Movements: Extraocular movements intact.      Conjunctiva/sclera: Conjunctivae normal.      Pupils: Pupils are equal, round, and reactive to light.   Cardiovascular:      Rate and Rhythm: Normal rate and regular rhythm.   Pulmonary:      Effort: Pulmonary effort is normal.      Breath sounds: Normal breath sounds.      Comments: Distant breath sounds  Abdominal:      General: Bowel sounds are normal.   Musculoskeletal:      Cervical back: Normal range of motion and neck supple.      Right lower leg: No edema.      Left lower leg: No edema.   Neurological:      General: No focal deficit present.      Mental Status: She is alert.         Data  Lab Results   Component Value Date    HGB 13.1 07/15/2024    HCT 40.2 07/15/2024    MCV 94 07/15/2024      Lab Results   Component Value Date    CALCIUM 8.8 10/31/2024    K 4.2 10/31/2024    CO2 26 10/31/2024     10/31/2024    BUN 15 10/31/2024    CREATININE 0.68 10/31/2024     No results found for: \"IRON\", \"TIBC\", \"FERRITIN\"  Lab Results   Component Value Date    AST 12 (L) 10/31/2024    ALT 7 10/31/2024         "

## 2025-04-24 NOTE — ASSESSMENT & PLAN NOTE
Underlying sleep apnea with potential chronic hypercapnia potentially related to OHS and untreated sleep apnea.  Discussed in detail the need to repeat sleep study even though this was completed 8 years ago and able to retrieve records.  If indicated patient is amenable to surgery start CPAP treatment.    Discussed ongoing lifestyle modification and weight management as obesity is definitely playing a part in her symptoms and deconditioning.  And treatment will definitely would benefit their quality of life.    Plan   diagnostic sleep study order patient will return to clinic in 3 months after sleep study.  Orders:    Ambulatory Referral to Sleep Medicine    Diagnostic Sleep Study; Future

## 2025-04-24 NOTE — PATIENT INSTRUCTIONS
"Patient Education     Sleep apnea in adults   The Basics   Written by the doctors and editors at [facility]   What is sleep apnea? -- Sleep apnea is a condition that makes you stop breathing for short periods while you are asleep. There are 2 types of sleep apnea. One is called \"obstructive sleep apnea.\" The other is called \"central sleep apnea.\"  In obstructive sleep apnea, you stop breathing because your throat narrows or closes (figure 1). In central sleep apnea, you stop breathing because your brain does not send the right signals to your muscles to make you breathe. When people talk about sleep apnea, they are usually referring to obstructive sleep apnea, which is what this article is about.  People with sleep apnea do not know that they stop breathing when they are asleep. But they do sometimes wake up startled or gasping for breath. They also often hear from loved ones that they snore.  What are the symptoms of sleep apnea? -- The main symptoms of sleep apnea are loud snoring, tiredness, and daytime sleepiness. Other symptoms can include:   Restless sleep   Waking up choking or gasping   Morning headaches, dry mouth, or sore throat   Waking up often to urinate   Waking up feeling unrested or groggy   Trouble thinking clearly or remembering things  Some people with sleep apnea don't have symptoms, or don't realize that they have them.  Should I see a doctor or nurse? -- Yes. If you think that you might have sleep apnea, see your doctor.  Is there a test for sleep apnea? -- Yes. First, your doctor or nurse will ask about your symptoms. If you have a bed partner, they might also ask that person if you snore or gasp in your sleep. If the doctor or nurse suspects that you have sleep apnea, they might send you for a \"sleep study.\"  Sleep studies can sometimes be done at home, but they are usually done in a sleep lab. For the study, you spend the night in the lab, and you are hooked up to different machines that " "monitor your heart rate, breathing, and other body functions. The results of the test tell your doctor or nurse if you have the disorder.  Is there anything I can do on my own to help my sleep apnea? -- Yes. Some things that might help:   Try to avoid sleeping on your back, if possible. This might help some people.   Lose weight, if you have excess body weight.   Get regular physical activity. This might help you lose weight. But even if it doesn't, being active is good for your health.   Avoid alcohol, especially in the evening. Alcohol can make sleep apnea worse.  How is sleep apnea treated? -- Treatment can include:   Weight loss - As mentioned above, weight loss can help if you have excess weight or obesity. But losing weight can be challenging, and it takes time to lose enough weight to help with your sleep apnea. Most people need other treatment while they work on losing weight.   CPAP - The most effective treatment for sleep apnea is a device that keeps your airway open while you sleep. Treatment with this device is called \"continuous positive airway pressure\" (\"CPAP\"). People getting CPAP wear a face mask at night that keeps them breathing (figure 2).  If your doctor or nurse recommends a CPAP machine, be patient about using it. The mask might seem uncomfortable to wear at first, and the machine might seem noisy, but using the machine can really help you. People with sleep apnea who use a CPAP machine feel more rested and generally feel better.  If CPAP does not work, your doctor might suggest other treatment. Options might include:   An oral device - This is a device that you wear in your mouth. It is called an \"oral appliance\" or \"mandibular advancement device.\" It helps keep your airway open while you sleep.   Hypoglossal nerve stimulation - This involves a procedure to implant a small device into your chest. The device has a wire that connects to the nerve under your tongue. While you are sleeping, it " sends an electrical signal that causes the tongue to push forward. This helps open up your airway.   Surgery to widen your airway - This might involve removing your tonsils or other tissue that blocks the airway.  Is sleep apnea dangerous? -- It can be. Risks include:   Accidents - People with sleep apnea do not get good-quality sleep, so they are often tired and not alert. This puts them at risk for car accidents and other types of accidents.   Other health problems - Studies show that people with sleep apnea are more likely than others to have high blood pressure, heart attacks, and other serious heart problems. Some people also have mood changes or depression.  In people with severe sleep apnea, getting treated (for example, with CPAP) can help lower these risks.  All topics are updated as new evidence becomes available and our peer review process is complete.  This topic retrieved from [employer] on: Feb 28, 2024.  Topic 80964 Version 18.0  Release: 32.2.4 - C32.58  © 2024 UpToDate, Inc. and/or its affiliates. All rights reserved.  figure 1: Airway in a person with sleep apnea     Normally, when a person sleeps, the airway remains open, and air can pass from the nose and mouth to the lungs. In a person with sleep apnea, parts of the throat and mouth drop into the airway and block off the flow of air. This can cause loud snoring and interrupt breathing for short periods.  Graphic 37793 Version 6.0  figure 2: Continuous positive airway pressure (CPAP) for sleep apnea     The CPAP mask gently blows air into your nose while you sleep. It puts just enough pressure on your airway to keep it from closing. The mask in this picture fits over just the nose. Other CPAP devices have masks that fit over the nose and mouth.  Graphic 10134 Version 5.0  Consumer Information Use and Disclaimer   Disclaimer: This generalized information is a limited summary of diagnosis, treatment, and/or medication information. It is not meant to  be comprehensive and should be used as a tool to help the user understand and/or assess potential diagnostic and treatment options. It does NOT include all information about conditions, treatments, medications, side effects, or risks that may apply to a specific patient. It is not intended to be medical advice or a substitute for the medical advice, diagnosis, or treatment of a health care provider based on the health care provider's examination and assessment of a patient's specific and unique circumstances. Patients must speak with a health care provider for complete information about their health, medical questions, and treatment options, including any risks or benefits regarding use of medications. This information does not endorse any treatments or medications as safe, effective, or approved for treating a specific patient. UpToDate, Inc. and its affiliates disclaim any warranty or liability relating to this information or the use thereof.The use of this information is governed by the Terms of Use, available at https://www.[employer].com/en/know/clinical-effectiveness-terms. 2024© UpToDate, Inc. and its affiliates and/or licensors. All rights reserved.  Copyright   © 2024 UpToDate, Inc. and/or its affiliates. All rights reserved.

## 2025-04-25 ENCOUNTER — TELEPHONE (OUTPATIENT)
Age: 55
End: 2025-04-25

## 2025-04-25 NOTE — TELEPHONE ENCOUNTER
Contacted patient in regards to Routine Referral in attempts to verify patient's needs of services. Writer verified N/A - NO ANSWER. Writer LVM and left callback number [phone]; option 3.    Final Call, closed, Unable to contact pt

## 2025-05-02 ENCOUNTER — VBI (OUTPATIENT)
Dept: ADMINISTRATIVE | Facility: OTHER | Age: 55
End: 2025-05-02

## 2025-05-02 NOTE — TELEPHONE ENCOUNTER
05/02/25 10:12 AM     Chart reviewed for Pap Smear (HPV) aka Cervical Cancer Screening ; nothing is submitted to the patient's insurance at this time.     Hannah Galvez   [facility]

## 2025-05-14 ENCOUNTER — OFFICE VISIT (OUTPATIENT)
Dept: NEUROLOGY | Facility: CLINIC | Age: 55
End: 2025-05-14
Payer: COMMERCIAL

## 2025-05-14 VITALS
SYSTOLIC BLOOD PRESSURE: 118 MMHG | DIASTOLIC BLOOD PRESSURE: 82 MMHG | TEMPERATURE: 97.3 F | OXYGEN SATURATION: 98 % | HEART RATE: 88 BPM | WEIGHT: 293 LBS | BODY MASS INDEX: 56.48 KG/M2

## 2025-05-14 DIAGNOSIS — G40.909 NONINTRACTABLE EPILEPSY WITHOUT STATUS EPILEPTICUS, UNSPECIFIED EPILEPSY TYPE (HCC): Primary | ICD-10-CM

## 2025-05-14 DIAGNOSIS — R51.9 CHRONIC DAILY HEADACHE: ICD-10-CM

## 2025-05-14 DIAGNOSIS — G47.33 OBSTRUCTIVE SLEEP APNEA SYNDROME IN ADULT: ICD-10-CM

## 2025-05-14 DIAGNOSIS — G43.011 INTRACTABLE MIGRAINE WITHOUT AURA AND WITH STATUS MIGRAINOSUS: ICD-10-CM

## 2025-05-14 PROCEDURE — 99214 OFFICE O/P EST MOD 30 MIN: CPT | Performed by: PHYSICIAN ASSISTANT

## 2025-05-14 NOTE — ASSESSMENT & PLAN NOTE
Recent brain MRI to assess for structural cause for seizure showed features concerning for possible IIH.  She has the primary risk factor for IIH, which is morbid obesity. MRI brain findings of prominent optic nerve sheath and transverse sinus stenosis are not diagnostic of IIH. Due to her intractable daily headaches, it was recommended that she have a lumbar puncture to assess for elevated intracranial pressure (will need opening pressure and closing pressure).  She was also referred to ophthalmology to assess for papilledema.    Unfortunately, she has not pursued any of this evaluation.  LP not scheduled, unaware of optho referral.  Discussed again with patient and son.  Provided optho referral, and advised scheduling LP at checkout today.      She did see sleep medicine for evaluation of KATHERINE which could cause chronic, daily headaches, and sleep study is scheduled in October 2025.  She was also referred to weight management, as weight loss may improve this, and is scheduled to be seen in June 2025.     Plan:  - please complete lumbar puncture as ordered by Dr. Moise   - advised to pursue ophthalmology referral for evaluation of visual fields and papilledema.    - see Weight Management for evaluation for weight loss program, as scheduled in June

## 2025-05-14 NOTE — PATIENT INSTRUCTIONS
Plan:   Recurrent seizures; possible generalized genetic epilepsy versus psychogenic nonepileptic seizures.  There is inconsistency in the seizure semiology.  Please try to video record the seizure if it happens again  - Continue with topiramate 100mg tab take one tab in the morning and one and a half tabs at bedtime (150mg)  - check topiramate level and BMP (please get blood work done at your earliest convenience)     Intractable migraines and migraines  - Pending an evaluation by sleep medicine about obstructive sleep apnea as a cause of your chronic daily headaches  - continue with topiramate  - continue with propranolol LA 80mg daily   - avoid daily use of acetaminophen and ibuprofen  - you may use Sumatriptan (Imitrex) 50mg tab for severe headache take one tab as needed     Abnormal MRI brain study suggestive of pseudotumor cerebrii or idiopathic intracranial hypertension  - please complete lumbar puncture as ordered by Dr. Moise   - you were previously referred to ophthalmology for evaluation of visual fields and papilledema.  Please complete this evaluation (call to schedule appt, let us know if there are any issues)  - see Weight Management for evaluation for weight loss program, as scheduled in June      Follow-up in 6 weeks (after lumbar puncture)
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
## 2025-05-14 NOTE — ASSESSMENT & PLAN NOTE
- continue with topiramate   - continue with propranolol LA 80mg daily   - avoid daily use of acetaminophen and ibuprofen  - you may use Sumatriptan (Imitrex) 50mg tab for severe headache take one tab as needed  - sleep study as scheduled

## 2025-05-14 NOTE — ASSESSMENT & PLAN NOTE
Ms. Maddie Arteaga is a 54 y.o. woman with intractable daily headaches and presumed seizure disorder. She completed an MRI brain study that did not show a structural cause for seizures; however, there are some features that can be seen in the setting of pseudotumor cerebri/IIH -see below.  Recent routine EEG study was normal. Diagnostic confirmation of these seizure events would require an epilepsy monitoring unit study. For now, she has not had recurrent seizures for several months. She mentions that her seizure starts with right hand shaking activity that last for a long duration of time prior to going into a whole body shaking event. She reports that stress and anxiety may trigger her seizures.     Will continue to monitor.         Hydroxyzine Pregnancy And Lactation Text: This medication is not safe during pregnancy and should not be taken. It is also excreted in breast milk and breast feeding isn't recommended.

## 2025-05-16 ENCOUNTER — RA CDI HCC (OUTPATIENT)
Dept: OTHER | Facility: HOSPITAL | Age: 55
End: 2025-05-16

## 2025-05-16 ENCOUNTER — HOSPITAL ENCOUNTER (OUTPATIENT)
Dept: SLEEP CENTER | Facility: CLINIC | Age: 55
Discharge: HOME | End: 2025-05-16
Attending: INTERNAL MEDICINE
Payer: COMMERCIAL

## 2025-05-16 DIAGNOSIS — G47.33 OBSTRUCTIVE SLEEP APNEA SYNDROME IN ADULT: ICD-10-CM

## 2025-05-16 PROCEDURE — 95810 POLYSOM 6/> YRS 4/> PARAM: CPT

## 2025-05-16 PROCEDURE — 95810 POLYSOM 6/> YRS 4/> PARAM: CPT | Performed by: INTERNAL MEDICINE

## 2025-05-16 NOTE — PROGRESS NOTES
HCC coding opportunities       Chart reviewed, no opportunity found: CHART REVIEWED, NO OPPORTUNITY FOUND   F33.2 assessed and documented on 4/16/25    [facility] AUDIT       Patients Insurance        Commercial Insurance: [employer]

## 2025-05-17 NOTE — PROGRESS NOTES
Sleep Study Documentation    Pre-Sleep Study       Sleep testing procedure explained to patient:YES    Patient napped prior to study:YES- more than 30 minutes. Napped after 2PM: yes    Caffeine:Dayshift worker after 12PM.  Caffeine use:YES- coffee  6 ounces    Alcohol:Dayshift workers after 5PM: Alcohol use:NO    Typical day for patient:YES         Study Documentation    Sleep Study Indications: KATHERINE    Montage used: Standard    Transcutaneous CO2 used: NO    Sleep Study Type:     Diagnostic Sleep Study         Oxygen Data  Supplemental O2 used: No      EKG/EEG/Abnormal Behaviors   EKG abnormalities: no None    EEG abnormalities: no    Were abnormal behaviors in sleep observed:NO  No        Is Total Sleep Study Recording Time < 2 hours: N/A    Is Total Sleep Study Recording Time > 2 hours but study is incomplete: N/A    Is Total Sleep Study Recording Time 6 hours or more but sleep was not obtained: NO        Post-Sleep Study    Medication used at bedtime or during sleep study:YES prescription sleep aid    Patient reports time it took to fall asleep:less than 20 minutes    Patient reports waking up during study:Denied    Patient reports sleeping 4 to 6 hours without dreaming.    Does the Patient feel this is a typical night of sleep:worse than usual    Patient rated sleepiness: Very sleepy or tired

## 2025-05-19 ENCOUNTER — OFFICE VISIT (OUTPATIENT)
Dept: FAMILY MEDICINE CLINIC | Facility: CLINIC | Age: 55
End: 2025-05-19
Payer: COMMERCIAL

## 2025-05-19 VITALS
BODY MASS INDEX: 51.91 KG/M2 | TEMPERATURE: 98 F | HEIGHT: 63 IN | DIASTOLIC BLOOD PRESSURE: 58 MMHG | SYSTOLIC BLOOD PRESSURE: 124 MMHG | HEART RATE: 59 BPM | WEIGHT: 293 LBS | OXYGEN SATURATION: 96 %

## 2025-05-19 DIAGNOSIS — Z00.00 ANNUAL PHYSICAL EXAM: Primary | ICD-10-CM

## 2025-05-19 DIAGNOSIS — M25.571 RIGHT ANKLE PAIN, UNSPECIFIED CHRONICITY: ICD-10-CM

## 2025-05-19 DIAGNOSIS — G47.33 OBSTRUCTIVE SLEEP APNEA SYNDROME IN ADULT: Primary | ICD-10-CM

## 2025-05-19 DIAGNOSIS — K76.0 METABOLIC DYSFUNCTION-ASSOCIATED STEATOTIC LIVER DISEASE (MASLD): ICD-10-CM

## 2025-05-19 DIAGNOSIS — G43.011 INTRACTABLE MIGRAINE WITHOUT AURA AND WITH STATUS MIGRAINOSUS: ICD-10-CM

## 2025-05-19 DIAGNOSIS — R51.9 ACUTE INTRACTABLE HEADACHE, UNSPECIFIED HEADACHE TYPE: ICD-10-CM

## 2025-05-19 DIAGNOSIS — Z11.4 SCREENING FOR HIV (HUMAN IMMUNODEFICIENCY VIRUS): ICD-10-CM

## 2025-05-19 DIAGNOSIS — G40.909 NONINTRACTABLE EPILEPSY WITHOUT STATUS EPILEPTICUS, UNSPECIFIED EPILEPSY TYPE (HCC): ICD-10-CM

## 2025-05-19 DIAGNOSIS — G47.33 OBSTRUCTIVE SLEEP APNEA SYNDROME IN ADULT: ICD-10-CM

## 2025-05-19 DIAGNOSIS — I10 PRIMARY HYPERTENSION: ICD-10-CM

## 2025-05-19 DIAGNOSIS — H47.10 PAPILLEDEMA: ICD-10-CM

## 2025-05-19 DIAGNOSIS — H91.90 DECREASED HEARING, UNSPECIFIED LATERALITY: ICD-10-CM

## 2025-05-19 DIAGNOSIS — F33.2 SEVERE EPISODE OF RECURRENT MAJOR DEPRESSIVE DISORDER, WITHOUT PSYCHOTIC FEATURES (HCC): Chronic | ICD-10-CM

## 2025-05-19 DIAGNOSIS — E66.01 MORBID OBESITY (HCC): ICD-10-CM

## 2025-05-19 DIAGNOSIS — Z13.6 SCREENING FOR CARDIOVASCULAR CONDITION: ICD-10-CM

## 2025-05-19 PROCEDURE — 99213 OFFICE O/P EST LOW 20 MIN: CPT

## 2025-05-19 PROCEDURE — 99396 PREV VISIT EST AGE 40-64: CPT

## 2025-05-19 RX ORDER — TOPIRAMATE 100 MG/1
TABLET, FILM COATED ORAL
Qty: 75 TABLET | Refills: 5 | Status: SHIPPED | OUTPATIENT
Start: 2025-05-19

## 2025-05-19 RX ORDER — PROPRANOLOL HYDROCHLORIDE 80 MG/1
80 CAPSULE, EXTENDED RELEASE ORAL DAILY
Qty: 90 CAPSULE | Refills: 1 | Status: SHIPPED | OUTPATIENT
Start: 2025-05-19

## 2025-05-19 RX ORDER — SUMATRIPTAN 50 MG/1
TABLET, FILM COATED ORAL
Qty: 9 TABLET | Refills: 5 | Status: SHIPPED | OUTPATIENT
Start: 2025-05-19

## 2025-05-19 RX ORDER — LISINOPRIL 5 MG/1
5 TABLET ORAL DAILY
Qty: 30 TABLET | Refills: 5 | Status: SHIPPED | OUTPATIENT
Start: 2025-05-19

## 2025-05-19 NOTE — ASSESSMENT & PLAN NOTE
Upcoming appointment with Weight Management in June  Orders:    US elastography/UGAP; Future    CBC and differential; Future    Comprehensive metabolic panel; Future    TSH, 3rd generation with Free T4 reflex; Future

## 2025-05-19 NOTE — ASSESSMENT & PLAN NOTE
Stable; continue current management per neurology  Orders:    propranolol (INDERAL LA) 80 mg 24 hr capsule; Take 1 capsule (80 mg total) by mouth daily    SUMAtriptan (IMITREX) 50 mg tablet; Take 1 tab at the start of a migraine headache, may repeat 1 tab in 2 hours.    topiramate (TOPAMAX) 100 mg tablet; Take 1 tablet (100 mg total) by mouth daily AND 1.5 tablets (150 mg total) daily at bedtime.

## 2025-05-19 NOTE — ASSESSMENT & PLAN NOTE
Stable; continue current regimen  Orders:    lisinopril (ZESTRIL) 5 mg tablet; Take 1 tablet (5 mg total) by mouth daily    Comprehensive metabolic panel; Future

## 2025-05-19 NOTE — ASSESSMENT & PLAN NOTE
Stable, no recent seizures. Follows with neurology   Advised to complete LP as recommended by neurology. Phone number for scheduling provided  Orders:    topiramate (TOPAMAX) 100 mg tablet; Take 1 tablet (100 mg total) by mouth daily AND 1.5 tablets (150 mg total) daily at bedtime.

## 2025-05-19 NOTE — PROGRESS NOTES
Adult Annual Physical  Name: Maddie Arteaga      : 1970      MRN: [id number]  Encounter Provider: Dorcas Shabazz MD  Encounter Date: 2025   Encounter department: [facility]    :  Assessment & Plan  Annual physical exam         Intractable migraine without aura and with status migrainosus  Stable; continue current management per neurology  Orders:    propranolol (INDERAL LA) 80 mg 24 hr capsule; Take 1 capsule (80 mg total) by mouth daily    SUMAtriptan (IMITREX) 50 mg tablet; Take 1 tab at the start of a migraine headache, may repeat 1 tab in 2 hours.    topiramate (TOPAMAX) 100 mg tablet; Take 1 tablet (100 mg total) by mouth daily AND 1.5 tablets (150 mg total) daily at bedtime.    Obstructive sleep apnea syndrome in adult  Had Sleep study on   Continue follow up with sleep medicine       Morbid obesity (HCC)  Upcoming appointment with Weight Management in   Orders:    US elastography/UGAP; Future    CBC and differential; Future    Comprehensive metabolic panel; Future    TSH, 3rd generation with Free T4 reflex; Future    Primary hypertension  Stable; continue current regimen  Orders:    lisinopril (ZESTRIL) 5 mg tablet; Take 1 tablet (5 mg total) by mouth daily    Comprehensive metabolic panel; Future    Severe episode of recurrent major depressive disorder, without psychotic features (HCC)  Stable; continue current regimen       Nonintractable epilepsy without status epilepticus, unspecified epilepsy type (HCC)  Stable, no recent seizures. Follows with neurology   Advised to complete LP as recommended by neurology. Phone number for scheduling provided  Orders:    topiramate (TOPAMAX) 100 mg tablet; Take 1 tablet (100 mg total) by mouth daily AND 1.5 tablets (150 mg total) daily at bedtime.    Papilledema  Referral placed for Ophthalmology  Orders:    Ambulatory Referral to Ophthalmology; Future    Decreased hearing, unspecified laterality    Orders:     Ambulatory Referral to Audiology; Future    Acute intractable headache, unspecified headache type    Orders:    propranolol (INDERAL LA) 80 mg 24 hr capsule; Take 1 capsule (80 mg total) by mouth daily    Metabolic dysfunction-associated steatotic liver disease (MASLD)    Orders:    US elastography/UGAP; Future    CBC and differential; Future    Comprehensive metabolic panel; Future    Iron Panel (Includes Ferritin, Iron Sat%, Iron, and TIBC); Future    Alpha-1-antitrypsin; Future    Hepatitis B surface antigen; Future    Hepatitis B surface antibody; Future    Hepatitis B core antibody, total; Future    Hepatitis C antibody; Future    US right upper quadrant; Future    Screening for cardiovascular condition    Orders:    Comprehensive metabolic panel; Future    US right upper quadrant; Future    Screening for HIV (human immunodeficiency virus)    Orders:    HIV 1/2 AB/AG w Reflex [facility] for 2 yr old and above; Future    Right ankle pain, unspecified chronicity  Right ankle discomfort for the past 1-2 weeks. Atraumatic  Noted it started around the time she started walking her dog more  No recent travel, swelling, erythema. Low suspicion for DVT at this time. Venous stasis changes noted BL  Accompanying burning sensation. No paresthesia  Obesity likely contributing. Could be arthritic pain as well.  Will evaluate for diabetes in setting of concomitant burning sensation  Advised supportive care. Ice, elevation, OTC voltaren  Can consider imaging if pain persist         RTC in 6 weeks for annual physical.    Preventive Screenings:    - Breast cancer screening: screening up-to-date     Immunizations:  - Immunizations due: Zoster (Shingrix)         History of Present Illness     Adult Annual Physical:  Patient presents for annual physical. Right ankle pain since a 1-2 week ago. No trauma. .     Diet and Physical Activity:  - Diet/Nutrition: well balanced diet and consuming 3-5 servings of fruits/vegetables daily.  -  "Exercise: no formal exercise and walking. Walks the dog    Depression Screening:    - PHQ-9 Score: 4    General Health:  - Sleep: sleeps poorly and 4-6 hours of sleep on average.  - Hearing: decreased hearing bilateral ears.  - Vision: vision problems.  - Dental: regular dental visits.    /GYN Health:  - Follows with GYN: no.   - Last menstrual cycle: 11/19/2024.   - History of STDs: no    Review of Systems   Respiratory:  Negative for cough and shortness of breath.    Cardiovascular:  Negative for chest pain.   Gastrointestinal:  Negative for abdominal pain, diarrhea, nausea and vomiting.   Musculoskeletal:  Positive for arthralgias (right ankle pain).   Psychiatric/Behavioral:  Positive for sleep disturbance. Negative for hallucinations, self-injury and suicidal ideas. The patient is not nervous/anxious and is not hyperactive.          Objective   /58 (BP Location: Left arm, Patient Position: Sitting, Cuff Size: Large)   Pulse 59   Temp 98 °F (36.7 °C) (Temporal)   Ht 5' 2.6\" (1.59 m)   Wt 136 kg (300 lb)   LMP 06/09/2024 (Approximate)   SpO2 96%   BMI 53.83 kg/m²     Physical Exam  Constitutional:       General: She is not in acute distress.     Appearance: Normal appearance. She is obese. She is not ill-appearing.   HENT:      Head: Normocephalic and atraumatic.      Nose: Nose normal.     Cardiovascular:      Rate and Rhythm: Normal rate and regular rhythm.      Pulses: Normal pulses.      Heart sounds: Normal heart sounds.   Pulmonary:      Effort: Pulmonary effort is normal. No respiratory distress.      Breath sounds: Normal breath sounds. No wheezing.   Abdominal:      General: Abdomen is flat. Bowel sounds are normal. There is no distension.      Palpations: Abdomen is soft.      Tenderness: There is no abdominal tenderness.     Musculoskeletal:      Cervical back: Neck supple.      Right lower leg: No tenderness or bony tenderness. No edema.      Left lower leg: No tenderness or bony " tenderness. No edema.      Right ankle: Normal. Normal range of motion.      Left ankle: Normal.      Comments: Venous stasis skin changes noted bilaterally   Lymphadenopathy:      Cervical: No cervical adenopathy.     Skin:     General: Skin is warm.      Capillary Refill: Capillary refill takes less than 2 seconds.     Neurological:      Mental Status: She is alert and oriented to person, place, and time.

## 2025-05-19 NOTE — PATIENT INSTRUCTIONS
Please schedule an appointment with ophthalmology (Eye doctor) and audiology ( for hearing test).    Get your blood work done before your next appointment.  Get your liver ultrasound scheduled before your next appointment.    Call To schedule lumbar puncture (procedure neurology wants about the fluid in your back), please contact Central Scheduling at [phone].

## 2025-05-22 ENCOUNTER — HOSPITAL ENCOUNTER (OUTPATIENT)
Dept: ULTRASOUND IMAGING | Facility: HOSPITAL | Age: 55
End: 2025-05-22
Payer: COMMERCIAL

## 2025-05-22 DIAGNOSIS — E66.01 MORBID OBESITY (HCC): ICD-10-CM

## 2025-05-22 DIAGNOSIS — Z13.6 SCREENING FOR CARDIOVASCULAR CONDITION: ICD-10-CM

## 2025-05-22 DIAGNOSIS — K76.0 METABOLIC DYSFUNCTION-ASSOCIATED STEATOTIC LIVER DISEASE (MASLD): ICD-10-CM

## 2025-05-22 PROCEDURE — 76981 USE PARENCHYMA: CPT

## 2025-05-22 PROCEDURE — 76705 ECHO EXAM OF ABDOMEN: CPT

## 2025-06-04 ENCOUNTER — TELEPHONE (OUTPATIENT)
Dept: SLEEP CENTER | Facility: CLINIC | Age: 55
End: 2025-06-04

## 2025-06-04 NOTE — TELEPHONE ENCOUNTER
Diagnostic study resulted, confirms severe KATHERINE, intermittent baseline hypoxia noted with significant hypoxemia present.  Sleep efficiency was very low.     CPAP titration study recommended.    Call placed to patient's daughter Gema Madison, phone number not in service, unable to leave a voice message.  Call placed to patient, left call back message.    MyChart message sent providing results, recommendations and instructions.

## 2025-06-04 NOTE — TELEPHONE ENCOUNTER
Received call from patient and advised of sleep study results and order for CPAP study.    Scheduled CPAP study for first available in [place] 9/26/25.  Instructions provided.  Verbalized understanding.  Added to wait list and notified office staff via Excel spreadsheet to expedite due to severity of sleep apnea.  Patient only willing to go to [place] sleep lab.

## 2025-06-12 ENCOUNTER — RESULTS FOLLOW-UP (OUTPATIENT)
Dept: FAMILY MEDICINE CLINIC | Facility: CLINIC | Age: 55
End: 2025-06-12

## 2025-06-19 ENCOUNTER — OFFICE VISIT (OUTPATIENT)
Dept: FAMILY MEDICINE CLINIC | Facility: CLINIC | Age: 55
End: 2025-06-19
Payer: COMMERCIAL

## 2025-06-19 VITALS
OXYGEN SATURATION: 97 % | HEART RATE: 72 BPM | BODY MASS INDEX: 51.91 KG/M2 | HEIGHT: 63 IN | SYSTOLIC BLOOD PRESSURE: 146 MMHG | WEIGHT: 293 LBS | TEMPERATURE: 97.7 F | DIASTOLIC BLOOD PRESSURE: 85 MMHG

## 2025-06-19 DIAGNOSIS — R51.9 CHRONIC DAILY HEADACHE: ICD-10-CM

## 2025-06-19 DIAGNOSIS — I10 PRIMARY HYPERTENSION: ICD-10-CM

## 2025-06-19 DIAGNOSIS — K76.0 HEPATIC STEATOSIS: ICD-10-CM

## 2025-06-19 DIAGNOSIS — E66.01 MORBID OBESITY (HCC): Primary | ICD-10-CM

## 2025-06-19 PROCEDURE — 99213 OFFICE O/P EST LOW 20 MIN: CPT

## 2025-06-19 NOTE — ASSESSMENT & PLAN NOTE
146/85 today, however generally is better controlled.  Compliant with lisinopril 5 mg daily.  Likely only elevated due to having to walk over your quickly.  Will recheck at follow-up appointment in 3 weeks.

## 2025-06-19 NOTE — ASSESSMENT & PLAN NOTE
Follows with neurology.  Currently taking Inderal and Topamax daily, Imitrex as needed.  Neurology had previously referred to ophthalmology for evaluation, and had wanted to schedule her for a lumbar puncture, both of which have not yet been done.  Discussed the importance of both of these with her today.  Gave her phone number for ophthalmology office to reschedule appointment, gave her her neurologist phone number to call them to discuss LP.

## 2025-06-19 NOTE — ASSESSMENT & PLAN NOTE
BMI 56.  Recent right upper quadrant ultrasound with elastography notable for greater than 67% steatosis.  Multiple labs that were recently ordered including alpha-1 antitrypsin, hepatitis B&C, CBC, CMP, iron panel have not been obtained.  Previously referred to weight management, however has not yet scheduled.  She notes that she has been working on weight loss and has lost roughly 35 pounds in a little over a year.    Plan  Given central scheduling number, discussed the importance of calling to schedule with bariatrics  Discussed importance of obtaining previously ordered labs  Discussed continued work on weight loss through lifestyle  Would likely be a good candidate for GLP-1 medication, however would like to get previously ordered labs before initiating medication.  She states she will obtain these labs ASAP, plan to follow-up in office in 3 weeks.

## 2025-06-19 NOTE — PROGRESS NOTES
Name: Maddie Arteaga      : 1970      MRN: [id number]  Encounter Provider: Jorge Guajardo DO  Encounter Date: 2025   Encounter department: [facility]  :  Assessment & Plan  Morbid obesity (HCC)  Hepatic steatosis  BMI 56.  Recent right upper quadrant ultrasound with elastography notable for greater than 67% steatosis.  Multiple labs that were recently ordered including alpha-1 antitrypsin, hepatitis B&C, CBC, CMP, iron panel have not been obtained.  Previously referred to weight management, however has not yet scheduled.  She notes that she has been working on weight loss and has lost roughly 35 pounds in a little over a year.    Plan  Given central scheduling number, discussed the importance of calling to schedule with bariatrics  Discussed importance of obtaining previously ordered labs  Discussed continued work on weight loss through lifestyle  Would likely be a good candidate for GLP-1 medication, however would like to get previously ordered labs before initiating medication.  She states she will obtain these labs ASAP, plan to follow-up in office in 3 weeks.           Primary hypertension  146/85 today, however generally is better controlled.  Compliant with lisinopril 5 mg daily.  Likely only elevated due to having to walk over your quickly.  Will recheck at follow-up appointment in 3 weeks.       Chronic daily headache  Follows with neurology.  Currently taking Inderal and Topamax daily, Imitrex as needed.  Neurology had previously referred to ophthalmology for evaluation, and had wanted to schedule her for a lumbar puncture, both of which have not yet been done.  Discussed the importance of both of these with her today.  Gave her phone number for ophthalmology office to reschedule appointment, gave her her neurologist phone number to call them to discuss LP.              History of Present Illness   HPI    54-year-old female with a history of epilepsy and chronic headaches  presents for follow-up on recent test results including right upper quadrant ultrasound that showed no significant change from previous ultrasound, as well as elastography which demonstrated S3 liver steatosis (greater than 67% steatosis).  Metavir score was unable to be accurately assessed during that exam.  At last appointment, she was ordered labs including CBC, CMP, iron panel, alpha-1 antitrypsin, hepatitis B and C, TSH, HIV, all of which have not yet been obtained. Also, she had recently been referred to ophthalmology by her neurologist and had an appointment 5/29, however she did not attend that appointment.  She has also been recommended for a lumbar puncture per neurology, has not yet been scheduled.     Review of Systems   Constitutional:  Negative for activity change and fever.   Respiratory:  Negative for shortness of breath.    Cardiovascular:  Negative for chest pain.   Gastrointestinal:  Negative for nausea and vomiting.   Neurological:  Positive for headaches. Negative for weakness.       Objective   LMP 06/09/2024 (Approximate)      Physical Exam  Constitutional:       General: She is not in acute distress.     Appearance: She is obese. She is not toxic-appearing.   HENT:      Head: Normocephalic and atraumatic.     Eyes:      General: No scleral icterus.     Conjunctiva/sclera: Conjunctivae normal.       Cardiovascular:      Rate and Rhythm: Normal rate.   Pulmonary:      Effort: Pulmonary effort is normal.     Skin:     General: Skin is warm and dry.     Neurological:      Mental Status: She is alert and oriented to person, place, and time.      Gait: Gait normal.     Psychiatric:         Mood and Affect: Mood normal.         Behavior: Behavior normal.

## 2025-06-24 ENCOUNTER — APPOINTMENT (OUTPATIENT)
Dept: LAB | Facility: HOSPITAL | Age: 55
End: 2025-06-24
Attending: PATHOLOGY
Payer: COMMERCIAL

## 2025-06-24 DIAGNOSIS — G40.909 NONINTRACTABLE EPILEPSY WITHOUT STATUS EPILEPTICUS, UNSPECIFIED EPILEPSY TYPE (HCC): ICD-10-CM

## 2025-06-24 DIAGNOSIS — Z13.6 SCREENING FOR CARDIOVASCULAR CONDITION: ICD-10-CM

## 2025-06-24 DIAGNOSIS — E66.01 MORBID OBESITY (HCC): ICD-10-CM

## 2025-06-24 DIAGNOSIS — Z00.6 ENCOUNTER FOR EXAMINATION FOR NORMAL COMPARISON OR CONTROL IN CLINICAL RESEARCH PROGRAM: ICD-10-CM

## 2025-06-24 DIAGNOSIS — I10 PRIMARY HYPERTENSION: ICD-10-CM

## 2025-06-24 DIAGNOSIS — K76.0 METABOLIC DYSFUNCTION-ASSOCIATED STEATOTIC LIVER DISEASE (MASLD): ICD-10-CM

## 2025-06-24 DIAGNOSIS — Z11.4 SCREENING FOR HIV (HUMAN IMMUNODEFICIENCY VIRUS): ICD-10-CM

## 2025-06-24 DIAGNOSIS — E66.01 MORBID OBESITY WITH BMI OF 50.0-59.9, ADULT (HCC): ICD-10-CM

## 2025-06-24 LAB
ALBUMIN SERPL BCG-MCNC: 4 G/DL (ref 3.5–5)
ALP SERPL-CCNC: 96 U/L (ref 34–104)
ALT SERPL W P-5'-P-CCNC: 15 U/L (ref 7–52)
ANION GAP SERPL CALCULATED.3IONS-SCNC: 7 MMOL/L (ref 4–13)
AST SERPL W P-5'-P-CCNC: 18 U/L (ref 13–39)
BASOPHILS # BLD AUTO: 0.04 THOUSANDS/ÂΜL (ref 0–0.1)
BASOPHILS NFR BLD AUTO: 1 % (ref 0–1)
BILIRUB SERPL-MCNC: 0.42 MG/DL (ref 0.2–1)
BUN SERPL-MCNC: 14 MG/DL (ref 5–25)
CALCIUM SERPL-MCNC: 10 MG/DL (ref 8.4–10.2)
CHLORIDE SERPL-SCNC: 106 MMOL/L (ref 96–108)
CO2 SERPL-SCNC: 26 MMOL/L (ref 21–32)
CREAT SERPL-MCNC: 0.84 MG/DL (ref 0.6–1.3)
EOSINOPHIL # BLD AUTO: 0.16 THOUSAND/ÂΜL (ref 0–0.61)
EOSINOPHIL NFR BLD AUTO: 2 % (ref 0–6)
ERYTHROCYTE [DISTWIDTH] IN BLOOD BY AUTOMATED COUNT: 12 % (ref 11.6–15.1)
EST. AVERAGE GLUCOSE BLD GHB EST-MCNC: 111 MG/DL
FERRITIN SERPL-MCNC: 75 NG/ML (ref 30–307)
GFR SERPL CREATININE-BSD FRML MDRD: 79 ML/MIN/1.73SQ M
GLUCOSE P FAST SERPL-MCNC: 104 MG/DL (ref 65–99)
HBA1C MFR BLD: 5.5 %
HBV CORE AB SER QL: NORMAL
HBV SURFACE AB SER-ACNC: <3 MIU/ML
HBV SURFACE AG SER QL: NORMAL
HCT VFR BLD AUTO: 40.9 % (ref 34.8–46.1)
HCV AB SER QL: NORMAL
HGB BLD-MCNC: 13.3 G/DL (ref 11.5–15.4)
HIV 1+2 AB+HIV1 P24 AG SERPL QL IA: NORMAL
IMM GRANULOCYTES # BLD AUTO: 0.03 THOUSAND/UL (ref 0–0.2)
IMM GRANULOCYTES NFR BLD AUTO: 0 % (ref 0–2)
IRON SATN MFR SERPL: 22 % (ref 15–50)
IRON SERPL-MCNC: 72 UG/DL (ref 50–212)
LYMPHOCYTES # BLD AUTO: 1.01 THOUSANDS/ÂΜL (ref 0.6–4.47)
LYMPHOCYTES NFR BLD AUTO: 15 % (ref 14–44)
MCH RBC QN AUTO: 30.9 PG (ref 26.8–34.3)
MCHC RBC AUTO-ENTMCNC: 32.5 G/DL (ref 31.4–37.4)
MCV RBC AUTO: 95 FL (ref 82–98)
MONOCYTES # BLD AUTO: 0.42 THOUSAND/ÂΜL (ref 0.17–1.22)
MONOCYTES NFR BLD AUTO: 6 % (ref 4–12)
NEUTROPHILS # BLD AUTO: 5.09 THOUSANDS/ÂΜL (ref 1.85–7.62)
NEUTS SEG NFR BLD AUTO: 76 % (ref 43–75)
NRBC BLD AUTO-RTO: 0 /100 WBCS
PLATELET # BLD AUTO: 174 THOUSANDS/UL (ref 149–390)
PMV BLD AUTO: 11.8 FL (ref 8.9–12.7)
POTASSIUM SERPL-SCNC: 4 MMOL/L (ref 3.5–5.3)
PROT SERPL-MCNC: 7.6 G/DL (ref 6.4–8.4)
RBC # BLD AUTO: 4.3 MILLION/UL (ref 3.81–5.12)
SODIUM SERPL-SCNC: 139 MMOL/L (ref 135–147)
TIBC SERPL-MCNC: 331.8 UG/DL (ref 250–450)
TRANSFERRIN SERPL-MCNC: 237 MG/DL (ref 203–362)
TSH SERPL DL<=0.05 MIU/L-ACNC: 3.3 UIU/ML (ref 0.45–4.5)
UIBC SERPL-MCNC: 260 UG/DL (ref 155–355)
WBC # BLD AUTO: 6.75 THOUSAND/UL (ref 4.31–10.16)

## 2025-06-24 PROCEDURE — 87340 HEPATITIS B SURFACE AG IA: CPT

## 2025-06-24 PROCEDURE — 83036 HEMOGLOBIN GLYCOSYLATED A1C: CPT

## 2025-06-24 PROCEDURE — 85025 COMPLETE CBC W/AUTO DIFF WBC: CPT

## 2025-06-24 PROCEDURE — 82728 ASSAY OF FERRITIN: CPT

## 2025-06-24 PROCEDURE — 80053 COMPREHEN METABOLIC PANEL: CPT

## 2025-06-24 PROCEDURE — 82103 ALPHA-1-ANTITRYPSIN TOTAL: CPT

## 2025-06-24 PROCEDURE — 87389 HIV-1 AG W/HIV-1&-2 AB AG IA: CPT

## 2025-06-24 PROCEDURE — 86704 HEP B CORE ANTIBODY TOTAL: CPT

## 2025-06-24 PROCEDURE — 86803 HEPATITIS C AB TEST: CPT

## 2025-06-24 PROCEDURE — 84443 ASSAY THYROID STIM HORMONE: CPT

## 2025-06-24 PROCEDURE — 83550 IRON BINDING TEST: CPT

## 2025-06-24 PROCEDURE — 83540 ASSAY OF IRON: CPT

## 2025-06-24 PROCEDURE — 80201 ASSAY OF TOPIRAMATE: CPT

## 2025-06-24 PROCEDURE — 86706 HEP B SURFACE ANTIBODY: CPT

## 2025-06-24 PROCEDURE — 36415 COLL VENOUS BLD VENIPUNCTURE: CPT

## 2025-06-25 LAB — A1AT SERPL-MCNC: 153 MG/DL (ref 101–187)

## 2025-06-26 LAB — TOPIRAMATE SERPL-MCNC: 6.1 UG/ML (ref 2–25)

## 2025-06-27 ENCOUNTER — TELEPHONE (OUTPATIENT)
Age: 55
End: 2025-06-27

## 2025-06-27 ENCOUNTER — TELEPHONE (OUTPATIENT)
Dept: FAMILY MEDICINE CLINIC | Facility: CLINIC | Age: 55
End: 2025-06-27

## 2025-06-27 NOTE — TELEPHONE ENCOUNTER
I lef tthe patient and her son a message regarding the weight management appointment today at 230. Neuro is set up for 10/10 and I gave a reminder for them to get her bloodwork done in the next week.

## 2025-06-27 NOTE — TELEPHONE ENCOUNTER
Kathy from [facility] in [place], called to schedule appointment for patient. Advised Kathy patient has appointment schedule 06/27/2025 at 2:30 pm

## 2025-07-01 LAB
APOB+LDLR+PCSK9 GENE MUT ANL BLD/T: NOT DETECTED
BRCA1+BRCA2 DEL+DUP + FULL MUT ANL BLD/T: NOT DETECTED
MLH1+MSH2+MSH6+PMS2 GN DEL+DUP+FUL M: NOT DETECTED

## 2025-07-03 ENCOUNTER — OFFICE VISIT (OUTPATIENT)
Dept: AUDIOLOGY | Age: 55
End: 2025-07-03
Payer: COMMERCIAL

## 2025-07-03 DIAGNOSIS — H90.3 SENSORY HEARING LOSS, BILATERAL: Primary | ICD-10-CM

## 2025-07-03 DIAGNOSIS — H91.90 DECREASED HEARING, UNSPECIFIED LATERALITY: ICD-10-CM

## 2025-07-03 PROCEDURE — 92557 COMPREHENSIVE HEARING TEST: CPT

## 2025-07-03 PROCEDURE — 92567 TYMPANOMETRY: CPT

## 2025-07-03 NOTE — PROGRESS NOTES
Diagnostic Hearing Evaluation    Name:  Maddie Arteaga  :  1970  Age:  54 y.o.   MRN:  [id number]  Date of Evaluation: 25     HISTORY:     Reason for visit: Difficulty hearing and ear pain in the right ear    Patient was 15 minutes late to appointment. Patient accommodated.     Maddie Arteaga is being seen today at the request of Dr. Concepcion Shabazz for an initial  evaluation of hearing. The patient reports difficulty hearing from the right ear and notes ear pain.    Otoscopic Evaluation:   Right Ear: Unremarkable, canal clear   Left Ear: Unremarkable, canal clear    Tympanometry:   Right Ear: Type A; normal middle ear pressure and static compliance    Left Ear: Type A; normal middle ear pressure and static compliance     Speech Audiometry:  Speech Reception (SRT)    Right Ear: 30 dB HL    Left Ear: 25 dB HL    Word Recognition Scores (WRS):  Right Ear: excellent (96 % correct)     Left Ear: excellent (100 % correct)    Stimuli: [id number]    Pure Tone Audiometry:  Conventional pure tone audiometry from 250 - 8000 Hz  was obtained with FAIR reliability and revealed the following:     Right Ear: Normal sloping to moderately severe sensorineural hearing loss (SNHL)    Left Ear: Borderline normal hearing    *see attached audiogram    RECOMMENDATIONS:  Annual hearing eval, Consult ENT, Return to [facility]. for F/U, and Copy to Patient/Caregiver    PATIENT EDUCATION:   The results of today's results and recommendations were reviewed with the patient and her hearing thresholds were explained at length. Treatment options, including amplification and communication strategies, were discussed as appropriate. The patient voiced understanding of her test results. Questions were addressed and the patient was encouraged to contact our department should concerns arise.      Mariaelena Moffett., CCC-A  Clinical Audiologist  [facility]  [address]  [place] [id number]